# Patient Record
Sex: FEMALE | Race: WHITE | NOT HISPANIC OR LATINO | Employment: OTHER | URBAN - METROPOLITAN AREA
[De-identification: names, ages, dates, MRNs, and addresses within clinical notes are randomized per-mention and may not be internally consistent; named-entity substitution may affect disease eponyms.]

---

## 2017-02-13 ENCOUNTER — HOSPITAL ENCOUNTER (OUTPATIENT)
Dept: LAB | Facility: MEDICAL CENTER | Age: 62
End: 2017-02-13
Attending: NURSE PRACTITIONER
Payer: MEDICAID

## 2017-02-13 LAB
BASOPHILS # BLD AUTO: 0.05 K/UL (ref 0–0.12)
BASOPHILS NFR BLD AUTO: 0.9 % (ref 0–1.8)
COMMENT 1642: NORMAL
EOSINOPHIL # BLD: 0 K/UL (ref 0–0.51)
EOSINOPHIL NFR BLD AUTO: 0 % (ref 0–6.9)
ERYTHROCYTE [DISTWIDTH] IN BLOOD BY AUTOMATED COUNT: 58.6 FL (ref 35.9–50)
FERRITIN SERPL-MCNC: 9.3 NG/ML (ref 10–291)
HCT VFR BLD AUTO: 45.2 % (ref 37–47)
HGB BLD-MCNC: 13.5 G/DL (ref 12–16)
IMM GRANULOCYTES # BLD AUTO: 0.01 K/UL (ref 0–0.11)
IMM GRANULOCYTES NFR BLD AUTO: 0.2 % (ref 0–0.9)
IRON SATN MFR SERPL: 17 % (ref 15–55)
IRON SERPL-MCNC: 90 UG/DL (ref 40–170)
LYMPHOCYTES # BLD: 1.84 K/UL (ref 1–4.8)
LYMPHOCYTES NFR BLD AUTO: 34.8 % (ref 22–41)
MCH RBC QN AUTO: 28.2 PG (ref 27–33)
MCHC RBC AUTO-ENTMCNC: 29.9 G/DL (ref 33.6–35)
MCV RBC AUTO: 94.4 FL (ref 81.4–97.8)
MONOCYTES # BLD: 0.48 K/UL (ref 0–0.85)
MONOCYTES NFR BLD AUTO: 9.1 % (ref 0–13.4)
MORPHOLOGY BLD-IMP: NORMAL
NEUTROPHILS # BLD: 2.9 K/UL (ref 2–7.15)
NEUTROPHILS NFR BLD AUTO: 55 % (ref 44–72)
NRBC # BLD AUTO: 0.02 K/UL
NRBC BLD-RTO: 0.4 /100 WBC
PLATELET # BLD AUTO: 361 K/UL (ref 164–446)
PLATELET BLD QL SMEAR: NORMAL
PMV BLD AUTO: 10.6 FL (ref 9–12.9)
POIKILOCYTOSIS BLD QL SMEAR: NORMAL
RBC # BLD AUTO: 4.79 M/UL (ref 4.2–5.4)
RBC BLD AUTO: PRESENT
TARGETS BLD QL SMEAR: NORMAL
TIBC SERPL-MCNC: 515 UG/DL (ref 250–450)
WBC # BLD AUTO: 5.3 K/UL (ref 4.8–10.8)

## 2017-02-13 PROCEDURE — 85025 COMPLETE CBC W/AUTO DIFF WBC: CPT

## 2017-02-13 PROCEDURE — 82728 ASSAY OF FERRITIN: CPT

## 2017-02-13 PROCEDURE — 36415 COLL VENOUS BLD VENIPUNCTURE: CPT

## 2017-02-13 PROCEDURE — 83550 IRON BINDING TEST: CPT

## 2017-02-13 PROCEDURE — 83540 ASSAY OF IRON: CPT

## 2017-02-15 ENCOUNTER — HOSPITAL ENCOUNTER (EMERGENCY)
Facility: MEDICAL CENTER | Age: 62
End: 2017-02-15
Payer: MEDICAID

## 2017-02-15 VITALS
TEMPERATURE: 97.8 F | DIASTOLIC BLOOD PRESSURE: 85 MMHG | HEIGHT: 62 IN | OXYGEN SATURATION: 95 % | WEIGHT: 135 LBS | HEART RATE: 108 BPM | RESPIRATION RATE: 16 BRPM | BODY MASS INDEX: 24.84 KG/M2 | SYSTOLIC BLOOD PRESSURE: 146 MMHG

## 2017-02-15 LAB — EKG IMPRESSION: NORMAL

## 2017-02-15 PROCEDURE — 302449 STATCHG TRIAGE ONLY (STATISTIC)

## 2017-02-15 PROCEDURE — 93005 ELECTROCARDIOGRAM TRACING: CPT

## 2017-02-15 NOTE — ED NOTES
"Pt c/o \"my heart hurts\", mother states pt has abd pain over her gallbladder; states sudden onset while pt was driving. Pt states has had same pain 2 x this week. Was seen for first episode here. Pt c/o recent fall 1-2 weeks ago.   "

## 2017-02-15 NOTE — ED NOTES
"Pt c/o n/v, chills since today. States not being able to breathe \"very good\", +diaphoresis, shaking per family.   "

## 2017-02-15 NOTE — ED NOTES
Pt pointing to epigastric area when again asked where it hurts. Pt squirming in chair, moaning, appears uncomfortable.

## 2017-05-05 ENCOUNTER — HOSPITAL ENCOUNTER (OUTPATIENT)
Dept: LAB | Facility: MEDICAL CENTER | Age: 62
End: 2017-05-05
Attending: NURSE PRACTITIONER
Payer: MEDICAID

## 2017-05-05 LAB
ANION GAP SERPL CALC-SCNC: 9 MMOL/L (ref 0–11.9)
BASOPHILS # BLD AUTO: 0.8 % (ref 0–1.8)
BASOPHILS # BLD: 0.05 K/UL (ref 0–0.12)
BUN SERPL-MCNC: 11 MG/DL (ref 8–22)
CALCIUM SERPL-MCNC: 10.1 MG/DL (ref 8.5–10.5)
CHLORIDE SERPL-SCNC: 103 MMOL/L (ref 96–112)
CO2 SERPL-SCNC: 22 MMOL/L (ref 20–33)
CREAT SERPL-MCNC: 0.72 MG/DL (ref 0.5–1.4)
EOSINOPHIL # BLD AUTO: 0.03 K/UL (ref 0–0.51)
EOSINOPHIL NFR BLD: 0.5 % (ref 0–6.9)
ERYTHROCYTE [DISTWIDTH] IN BLOOD BY AUTOMATED COUNT: 54.4 FL (ref 35.9–50)
GFR SERPL CREATININE-BSD FRML MDRD: >60 ML/MIN/1.73 M 2
GLUCOSE SERPL-MCNC: 120 MG/DL (ref 65–99)
HCT VFR BLD AUTO: 44 % (ref 37–47)
HGB BLD-MCNC: 13.3 G/DL (ref 12–16)
IMM GRANULOCYTES # BLD AUTO: 0.04 K/UL (ref 0–0.11)
IMM GRANULOCYTES NFR BLD AUTO: 0.6 % (ref 0–0.9)
LYMPHOCYTES # BLD AUTO: 2.04 K/UL (ref 1–4.8)
LYMPHOCYTES NFR BLD: 30.9 % (ref 22–41)
MCH RBC QN AUTO: 28.2 PG (ref 27–33)
MCHC RBC AUTO-ENTMCNC: 30.2 G/DL (ref 33.6–35)
MCV RBC AUTO: 93.4 FL (ref 81.4–97.8)
MONOCYTES # BLD AUTO: 0.75 K/UL (ref 0–0.85)
MONOCYTES NFR BLD AUTO: 11.4 % (ref 0–13.4)
NEUTROPHILS # BLD AUTO: 3.69 K/UL (ref 2–7.15)
NEUTROPHILS NFR BLD: 55.8 % (ref 44–72)
NRBC # BLD AUTO: 0 K/UL
NRBC BLD AUTO-RTO: 0 /100 WBC
PLATELET # BLD AUTO: 399 K/UL (ref 164–446)
PMV BLD AUTO: 10.3 FL (ref 9–12.9)
POTASSIUM SERPL-SCNC: 5.1 MMOL/L (ref 3.6–5.5)
RBC # BLD AUTO: 4.71 M/UL (ref 4.2–5.4)
SODIUM SERPL-SCNC: 134 MMOL/L (ref 135–145)
WBC # BLD AUTO: 6.6 K/UL (ref 4.8–10.8)

## 2017-05-05 PROCEDURE — 85025 COMPLETE CBC W/AUTO DIFF WBC: CPT

## 2017-05-05 PROCEDURE — 80048 BASIC METABOLIC PNL TOTAL CA: CPT

## 2017-05-05 PROCEDURE — 36415 COLL VENOUS BLD VENIPUNCTURE: CPT

## 2018-11-27 ENCOUNTER — APPOINTMENT (OUTPATIENT)
Dept: PHYSICAL MEDICINE AND REHAB | Facility: MEDICAL CENTER | Age: 63
End: 2018-11-27
Payer: MEDICAID

## 2019-01-31 ENCOUNTER — OFFICE VISIT (OUTPATIENT)
Dept: URGENT CARE | Facility: CLINIC | Age: 64
End: 2019-01-31
Payer: MEDICAID

## 2019-01-31 ENCOUNTER — APPOINTMENT (OUTPATIENT)
Dept: RADIOLOGY | Facility: IMAGING CENTER | Age: 64
End: 2019-01-31
Attending: PHYSICIAN ASSISTANT
Payer: MEDICAID

## 2019-01-31 VITALS
HEIGHT: 62 IN | TEMPERATURE: 98.4 F | DIASTOLIC BLOOD PRESSURE: 74 MMHG | SYSTOLIC BLOOD PRESSURE: 124 MMHG | OXYGEN SATURATION: 98 % | HEART RATE: 66 BPM | RESPIRATION RATE: 16 BRPM | BODY MASS INDEX: 24.84 KG/M2 | WEIGHT: 135 LBS

## 2019-01-31 DIAGNOSIS — S63.502A SPRAIN OF LEFT WRIST, INITIAL ENCOUNTER: ICD-10-CM

## 2019-01-31 PROCEDURE — 99203 OFFICE O/P NEW LOW 30 MIN: CPT | Performed by: PHYSICIAN ASSISTANT

## 2019-01-31 PROCEDURE — 73110 X-RAY EXAM OF WRIST: CPT | Mod: LT | Performed by: EMERGENCY MEDICINE

## 2019-02-01 ASSESSMENT — ENCOUNTER SYMPTOMS
LOSS OF CONSCIOUSNESS: 0
PALPITATIONS: 0
FOCAL WEAKNESS: 0
COUGH: 0
FEVER: 0
SHORTNESS OF BREATH: 0
SENSORY CHANGE: 0
TINGLING: 0
SEIZURES: 0
CHILLS: 0
SPEECH CHANGE: 0
DIZZINESS: 0
TREMORS: 0
HEADACHES: 0
BLURRED VISION: 0
DOUBLE VISION: 0

## 2019-02-01 NOTE — PROGRESS NOTES
Subjective:      Vaishali Escobar is a 63 y.o. female who presents with Wrist Injury (Lt wrist injury, fell)            Wrist Injury    The incident occurred 3 to 6 hours ago. The incident occurred at home. The injury mechanism was a fall. The pain is present in the left wrist (left wrist). The pain is moderate. Pertinent negatives include no chest pain or tingling. The symptoms are aggravated by movement. She has tried nothing for the symptoms.       Review of Systems   Constitutional: Negative for chills and fever.   Eyes: Negative for blurred vision and double vision.   Respiratory: Negative for cough and shortness of breath.    Cardiovascular: Negative for chest pain and palpitations.   Musculoskeletal:        Wrist pain   Skin: Negative for rash.   Neurological: Negative for dizziness, tingling, tremors, sensory change, speech change, focal weakness, seizures, loss of consciousness and headaches.   All other systems reviewed and are negative.    PMH:  has a past medical history of Anesthesia; Angina; Angina pectoris (6/19/2009); Arthritis; Backpain (1998); CAD (coronary artery disease); Cancer ([facility]); Cervical radiculopathy (4/1/2009); Chronic pain (6/29/2011); COPD (chronic obstructive pulmonary disease) ([facility]) (4/1/2009); Dental disorder; Depression (4/1/2009); Dyslipidemia (6/29/2011); Headache(784.0) (4/1/2009); Heart burn; History of cardiac catheterization (4/1/2009); History of coronary artery bypass graft (4/1/2009); History of gastrointestinal bleeding (4/1/2009); History of hypertension (6/19/2009); History of mixed hyperlipidemia (6/19/2009); Lumbar radiculopathy (4/1/2009); Pneumonia (1990); Sepsis ([facility]) (2006 or 2007); Sleep apnea; Snoring; and Unspecified cataract.  MEDS:   Current Outpatient Prescriptions:   •  promethazine (PHENERGAN) 25 MG Tab, Take 25 mg by mouth every 6 hours as needed for Nausea/Vomiting., Disp: , Rfl:   •  calcium carbonate (TUMS) 500 MG Chew Tab, Take 500 mg by  mouth as needed (For hertburn)., Disp: , Rfl:   •  ketotifen (ZADITOR) 0.025 % ophthalmic solution, Place 1 Drop in both eyes as needed (For dry eye)., Disp: , Rfl:   •  gabapentin (NEURONTIN) 800 MG tablet, Take 800 mg by mouth 3 times a day., Disp: , Rfl:   •  Buprenorphine 20 MCG/HR PATCH WEEKLY, Apply 1 Patch to skin as directed every 7 days. On Sat, Disp: , Rfl:   •  methocarbamol (ROBAXIN) 500 MG Tab, Take 500-1,500 mg by mouth 3 times a day as needed. Indications: Musculoskeletal Pain, Disp: , Rfl:   •  Tapentadol HCl (NUCYNTA) 75 MG TABS, Take 75 mg by mouth every 3 hours. Indications: Moderate to Severe Pain, Disp: , Rfl:   ALLERGIES:   Allergies   Allergen Reactions   • Povidone Iodine Rash     SURGHX:   Past Surgical History:   Procedure Laterality Date   • HARDWARE REMOVAL NEURO N/A 6/23/2016    Procedure: HARDWARE REMOVAL NEURO L3-S1;  Surgeon: Aaron Martines M.D.;  Location: [facility];  Service:    • CERVICAL DISK AND FUSION ANTERIOR  4/28/2016    Procedure: CERVICAL DISK AND FUSION ANTERIOR Cervical4-5 WITH PEEK INTERBODY;  Surgeon: Aaron Martines M.D.;  Location: [facility];  Service:    • BASAL CELL EXCISION  2/19/2015    Performed by Adán Blake Jr., M.D. at SURGERY [facility]   • FLAP CLOSURE  2/19/2015    Performed by Adán Blake Jr., M.D. at [facility]   • OTHER ABDOMINAL SURGERY  2007    gastric bypass   • OTHER  2007    neck surgery   • OTHER CARDIAC SURGERY  2006    bypass x 4 vessels   • ARLEN HOLES     • OTHER NEUROLOGICAL SURG  2007,    6 back surgeries (at ages 13, 18, 20, 37, and 52)     SOCHX:  reports that she quit smoking about 13 years ago. Her smoking use included Cigarettes. She has a 20.00 pack-year smoking history. She has never used smokeless tobacco. She reports that she does not drink alcohol or use drugs.  FH: Family history was reviewed, no pertinent findings to report  Medications, Allergies, and current problem list  "reviewed today in Epic       Objective:     /74   Pulse 66   Temp 36.9 °C (98.4 °F)   Resp 16   Ht 1.575 m (5' 2\")   Wt 61.2 kg (135 lb)   SpO2 98%   BMI 24.69 kg/m²      Physical Exam   Constitutional: She is oriented to person, place, and time. She appears well-developed and well-nourished.   HENT:   Head: Normocephalic.   Neck: Normal range of motion. Neck supple.   Cardiovascular: Normal rate, regular rhythm, normal heart sounds and intact distal pulses.    Pulmonary/Chest: Effort normal and breath sounds normal.   Musculoskeletal: She exhibits tenderness.   PTP of medial left wrist.  2 point discriminate sensory intact.  Flexion and extension of digits in affected hand intact.  Distal pulses intact.  Cap refills are brisk < 2 seconds.   Neurological: She is alert and oriented to person, place, and time.   Psychiatric: She has a normal mood and affect. Her behavior is normal. Judgment and thought content normal.   Vitals reviewed.         1/31/2019 7:07 PM    HISTORY/REASON FOR EXAM:  Pain/Deformity Following Trauma. Fall.    TECHNIQUE/EXAM DESCRIPTION AND NUMBER OF VIEWS:  4 views of the LEFT wrist.    COMPARISON: None    FINDINGS:      There is no significant focal swelling.    There is no evidence of displaced  fracture or dislocation.       Impression       1.  There is no acute fracture or malalignment of the left wrist.          Assessment/Plan:     1. Sprain of left wrist, initial encounter  - RICE therapy  - DX-WRIST-COMPLETE 3+ LEFT; Future    Differential diagnosis, natural history, supportive care discussed. Follow-up with primary care provider within 7-10 days, emergency room precautions discussed.  Patient and/or family appears understanding of information.  Handout and review of patients diagnosis and treatment was discussed extensively.     "

## 2019-04-30 ENCOUNTER — OFFICE VISIT (OUTPATIENT)
Dept: URGENT CARE | Facility: CLINIC | Age: 64
End: 2019-04-30
Payer: MEDICAID

## 2019-04-30 VITALS
SYSTOLIC BLOOD PRESSURE: 114 MMHG | HEART RATE: 68 BPM | WEIGHT: 135 LBS | OXYGEN SATURATION: 98 % | HEIGHT: 62 IN | TEMPERATURE: 98 F | BODY MASS INDEX: 24.84 KG/M2 | DIASTOLIC BLOOD PRESSURE: 72 MMHG

## 2019-04-30 DIAGNOSIS — J01.00 ACUTE NON-RECURRENT MAXILLARY SINUSITIS: ICD-10-CM

## 2019-04-30 PROCEDURE — 99214 OFFICE O/P EST MOD 30 MIN: CPT | Performed by: PHYSICIAN ASSISTANT

## 2019-04-30 RX ORDER — FLUTICASONE PROPIONATE 50 MCG
1 SPRAY, SUSPENSION (ML) NASAL DAILY
Qty: 16 G | Refills: 0 | Status: SHIPPED | OUTPATIENT
Start: 2019-04-30 | End: 2023-08-10

## 2019-04-30 RX ORDER — OXYCODONE HYDROCHLORIDE 5 MG/1
3 TABLET ORAL EVERY 4 HOURS PRN
Status: ON HOLD | COMMUNITY
End: 2020-09-15

## 2019-04-30 RX ORDER — AMOXICILLIN AND CLAVULANATE POTASSIUM 875; 125 MG/1; MG/1
1 TABLET, FILM COATED ORAL 2 TIMES DAILY
Qty: 14 TAB | Refills: 0 | Status: SHIPPED | OUTPATIENT
Start: 2019-04-30 | End: 2019-05-07

## 2019-05-01 ASSESSMENT — ENCOUNTER SYMPTOMS
NAUSEA: 0
FEVER: 0
ABDOMINAL PAIN: 0
DIARRHEA: 0
MUSCULOSKELETAL NEGATIVE: 1
SHORTNESS OF BREATH: 0
CHILLS: 0
HEADACHES: 1
SINUS PAIN: 1
DIZZINESS: 0
RHINORRHEA: 1
COUGH: 1
VOMITING: 0
SPUTUM PRODUCTION: 0
SORE THROAT: 1

## 2019-05-02 NOTE — PROGRESS NOTES
"Subjective:      Vaishali Escobar is a 63 y.o. female who presents with Otalgia (sore thoat, jaw pain x 4 days )            Otalgia    There is pain in both ears. This is a new problem. The current episode started in the past 7 days (4 days). The problem occurs constantly. The problem has been unchanged. There has been no fever. The pain is at a severity of 3/10. The pain is mild. Associated symptoms include coughing, headaches, rhinorrhea and a sore throat. Pertinent negatives include no abdominal pain, diarrhea, ear discharge, rash or vomiting. She has tried nothing for the symptoms.       Review of Systems   Constitutional: Negative for chills and fever.   HENT: Positive for congestion, ear pain, rhinorrhea, sinus pain and sore throat. Negative for ear discharge.    Respiratory: Positive for cough. Negative for sputum production and shortness of breath.    Cardiovascular: Negative for chest pain.   Gastrointestinal: Negative for abdominal pain, diarrhea, nausea and vomiting.   Genitourinary: Negative.    Musculoskeletal: Negative.    Skin: Negative for rash.   Neurological: Positive for headaches. Negative for dizziness.        Objective:     /72   Pulse 68   Temp 36.7 °C (98 °F)   Ht 1.575 m (5' 2\")   Wt 61.2 kg (135 lb)   SpO2 98%   BMI 24.69 kg/m²      Physical Exam   Constitutional: She is oriented to person, place, and time. She appears well-developed and well-nourished. No distress.   HENT:   Head: Normocephalic and atraumatic.   Right Ear: Hearing, tympanic membrane, external ear and ear canal normal.   Left Ear: Hearing, tympanic membrane, external ear and ear canal normal.   Nose: Mucosal edema and rhinorrhea present. Right sinus exhibits maxillary sinus tenderness. Right sinus exhibits no frontal sinus tenderness. Left sinus exhibits maxillary sinus tenderness. Left sinus exhibits no frontal sinus tenderness.   Mouth/Throat: Posterior oropharyngeal erythema present. No oropharyngeal " exudate or posterior oropharyngeal edema.   Eyes: Pupils are equal, round, and reactive to light. Conjunctivae are normal. Right eye exhibits no discharge. Left eye exhibits no discharge.   Neck: Normal range of motion.   Cardiovascular: Normal rate, regular rhythm and normal heart sounds.    No murmur heard.  Pulmonary/Chest: Effort normal and breath sounds normal. No respiratory distress. She has no wheezes. She has no rales.   Musculoskeletal: Normal range of motion.   Lymphadenopathy:     She has no cervical adenopathy.   Neurological: She is alert and oriented to person, place, and time.   Skin: Skin is warm and dry. She is not diaphoretic.   Psychiatric: She has a normal mood and affect. Her behavior is normal.   Nursing note and vitals reviewed.         PMH:  has a past medical history of Anesthesia; Angina; Angina pectoris (6/19/2009); Arthritis; Backpain (1998); CAD (coronary artery disease); Cancer ([facility]); Cervical radiculopathy (4/1/2009); Chronic pain (6/29/2011); COPD (chronic obstructive pulmonary disease) ([facility]) (4/1/2009); Dental disorder; Depression (4/1/2009); Dyslipidemia (6/29/2011); Headache(784.0) (4/1/2009); Heart burn; History of cardiac catheterization (4/1/2009); History of coronary artery bypass graft (4/1/2009); History of gastrointestinal bleeding (4/1/2009); History of hypertension (6/19/2009); History of mixed hyperlipidemia (6/19/2009); Lumbar radiculopathy (4/1/2009); Pneumonia (1990); Sepsis ([facility]) (2006 or 2007); Sleep apnea; Snoring; and Unspecified cataract.  MEDS:   Current Outpatient Prescriptions:   •  oxyCODONE immediate-release (ROXICODONE) 5 MG Tab, Take 5 mg by mouth every four hours as needed for Severe Pain., Disp: , Rfl:   •  amoxicillin-clavulanate (AUGMENTIN) 875-125 MG Tab, Take 1 Tab by mouth 2 times a day for 7 days., Disp: 14 Tab, Rfl: 0  •  fluticasone (FLONASE) 50 MCG/ACT nasal spray, Spray 1 Spray in nose every day., Disp: 16 g, Rfl: 0  •  gabapentin (NEURONTIN)  800 MG tablet, Take 800 mg by mouth 3 times a day., Disp: , Rfl:   •  promethazine (PHENERGAN) 25 MG Tab, Take 25 mg by mouth every 6 hours as needed for Nausea/Vomiting., Disp: , Rfl:   •  calcium carbonate (TUMS) 500 MG Chew Tab, Take 500 mg by mouth as needed (For hertburn)., Disp: , Rfl:   •  ketotifen (ZADITOR) 0.025 % ophthalmic solution, Place 1 Drop in both eyes as needed (For dry eye)., Disp: , Rfl:   •  Buprenorphine 20 MCG/HR PATCH WEEKLY, Apply 1 Patch to skin as directed every 7 days. On Sat, Disp: , Rfl:   •  methocarbamol (ROBAXIN) 500 MG Tab, Take 500-1,500 mg by mouth 3 times a day as needed. Indications: Musculoskeletal Pain, Disp: , Rfl:   •  Tapentadol HCl (NUCYNTA) 75 MG TABS, Take 75 mg by mouth every 3 hours. Indications: Moderate to Severe Pain, Disp: , Rfl:   ALLERGIES:   Allergies   Allergen Reactions   • Povidone Iodine Rash     SURGHX:   Past Surgical History:   Procedure Laterality Date   • HARDWARE REMOVAL NEURO N/A 6/23/2016    Procedure: HARDWARE REMOVAL NEURO L3-S1;  Surgeon: Aaron Martines M.D.;  Location: [facility];  Service:    • CERVICAL DISK AND FUSION ANTERIOR  4/28/2016    Procedure: CERVICAL DISK AND FUSION ANTERIOR Cervical4-5 WITH PEEK INTERBODY;  Surgeon: Aaron Martines M.D.;  Location: [facility];  Service:    • BASAL CELL EXCISION  2/19/2015    Performed by Adán Blake Jr., M.D. at SURGERY [facility]   • FLAP CLOSURE  2/19/2015    Performed by Adán Blake Jr., M.D. at [facility]   • OTHER ABDOMINAL SURGERY  2007    gastric bypass   • OTHER  2007    neck surgery   • OTHER CARDIAC SURGERY  2006    bypass x 4 vessels   • ARLEN HOLES     • OTHER NEUROLOGICAL SURG  2007,    6 back surgeries (at ages 13, 18, 20, 37, and 52)     SOCHX:  reports that she quit smoking about 13 years ago. Her smoking use included Cigarettes. She has a 20.00 pack-year smoking history. She has never used smokeless tobacco. She reports that she  does not drink alcohol or use drugs.  FH: family history includes Cancer in her father.     Assessment/Plan:     1. Acute non-recurrent maxillary sinusitis  - amoxicillin-clavulanate (AUGMENTIN) 875-125 MG Tab; Take 1 Tab by mouth 2 times a day for 7 days.  Dispense: 14 Tab; Refill: 0   - Complete full course of antibiotics as prescribed   - fluticasone (FLONASE) 50 MCG/ACT nasal spray; Spray 1 Spray in nose every day.  Dispense: 16 g; Refill: 0    Discussed use of nedi-pot, humidifier, and Flonase nasal spray for symptomatic relief. Call or return to office if symptoms persist or worsen. The patient demonstrated a good understanding and agreed with the treatment plan.

## 2020-03-10 ENCOUNTER — HOSPITAL ENCOUNTER (OUTPATIENT)
Facility: MEDICAL CENTER | Age: 65
End: 2020-03-10
Attending: ORTHOPAEDIC SURGERY | Admitting: ORTHOPAEDIC SURGERY
Payer: MEDICARE

## 2020-05-27 ENCOUNTER — HOSPITAL ENCOUNTER (OUTPATIENT)
Facility: MEDICAL CENTER | Age: 65
End: 2020-05-27
Attending: ORTHOPAEDIC SURGERY | Admitting: ORTHOPAEDIC SURGERY
Payer: MEDICARE

## 2020-07-22 ENCOUNTER — OFFICE VISIT (OUTPATIENT)
Dept: CARDIOLOGY | Facility: MEDICAL CENTER | Age: 65
End: 2020-07-22
Payer: MEDICARE

## 2020-07-22 VITALS
OXYGEN SATURATION: 97 % | WEIGHT: 116.4 LBS | HEIGHT: 61 IN | DIASTOLIC BLOOD PRESSURE: 84 MMHG | RESPIRATION RATE: 12 BRPM | BODY MASS INDEX: 21.98 KG/M2 | SYSTOLIC BLOOD PRESSURE: 120 MMHG | HEART RATE: 60 BPM

## 2020-07-22 DIAGNOSIS — I10 ESSENTIAL HYPERTENSION: ICD-10-CM

## 2020-07-22 DIAGNOSIS — Z01.810 PREOP CARDIOVASCULAR EXAM: ICD-10-CM

## 2020-07-22 LAB — EKG IMPRESSION: NORMAL

## 2020-07-22 PROCEDURE — 93000 ELECTROCARDIOGRAM COMPLETE: CPT | Performed by: INTERNAL MEDICINE

## 2020-07-22 PROCEDURE — 99204 OFFICE O/P NEW MOD 45 MIN: CPT | Performed by: INTERNAL MEDICINE

## 2020-07-22 RX ORDER — ATORVASTATIN CALCIUM 10 MG/1
10 TABLET, FILM COATED ORAL DAILY
Qty: 30 TAB | Refills: 11 | Status: SHIPPED | OUTPATIENT
Start: 2020-07-22 | End: 2021-03-26

## 2020-07-22 RX ORDER — FENTANYL 50 UG/1
PATCH TRANSDERMAL
COMMUNITY
Start: 2020-07-20 | End: 2023-08-10

## 2020-07-22 NOTE — PROGRESS NOTES
"    Cardiology Initial Consultation Note    Date of note:    7/22/2020    Primary Care Provider: Clarisa Ricci M.D.  Referring Provider: Juventino Greene M.D.     Patient Name: Vaishali Recio   YOB: 1955  MRN:              [id number]    Chief Complaint: Preop cardiovascular examination, coronary artery disease    Vaishali Emery is a 65 y.o. female  patient presented today for preop cardiovascular examination prior to shoulder surgery.  She also has history of hypertension, hyperlipidemia, coronary artery disease status post four-vessel CABG in 2012.  She was being followed in our clinic before.  She feels well from cardiac standpoint denies chest pains, shortness of breath.  She is not very active due to her chronic back pain but fairly active, cleans the house and does vacuuming etc.  She is not taking any cardiac medications at this time.  She does not have any specific complaints to me today    ROS    Chronic back pain, shoulder pain, neck pain.  All other systems reviewed and discussed using a comprehensive questionnaire and are negative.     Past medical history, family history, social history, allergies and labs are reviewed and updated as needed as documented below.    Past Medical History:   Diagnosis Date   • Anesthesia     \"difficulty breathing\"   • Angina     none currently    • Angina pectoris 6/19/2009   • Arthritis    • Backpain 1998   • CAD (coronary artery disease)    • Cancer ([facility])     skin   • Cervical radiculopathy 4/1/2009   • Chronic pain 6/29/2011   • COPD (chronic obstructive pulmonary disease) ([facility]) 4/1/2009   • Dental disorder     broken tooth    • Depression 4/1/2009   • Dyslipidemia 6/29/2011   • Headache(784.0) 4/1/2009   • Heart burn    • History of cardiac catheterization 4/1/2009   • History of coronary artery bypass graft 4/1/2009   • History of gastrointestinal bleeding 4/1/2009   • History of hypertension 6/19/2009   • History of mixed " hyperlipidemia 6/19/2009   • Lumbar radiculopathy 4/1/2009   • Pneumonia 1990    approximate date   • Sepsis ([facility]) 2006 or 2007   • Sleep apnea     before gastric bypass, no CPAP now   • Snoring    • Unspecified cataract     early stage          Past Surgical History:   Procedure Laterality Date   • HARDWARE REMOVAL NEURO N/A 6/23/2016    Procedure: HARDWARE REMOVAL NEURO L3-S1;  Surgeon: Aaron Martines M.D.;  Location: SURGERY [facility];  Service:    • CERVICAL DISK AND FUSION ANTERIOR  4/28/2016    Procedure: CERVICAL DISK AND FUSION ANTERIOR Cervical4-5 WITH PEEK INTERBODY;  Surgeon: Aaron Martines M.D.;  Location: SURGERY [facility];  Service:    • BASAL CELL EXCISION  2/19/2015    Performed by Adán Blake Jr., M.D. at SURGERY [facility]   • FLAP CLOSURE  2/19/2015    Performed by Adán Blake Jr., M.D. at SURGERY [facility]   • OTHER ABDOMINAL SURGERY  2007    gastric bypass   • OTHER  2007    neck surgery   • OTHER CARDIAC SURGERY  2006    bypass x 4 vessels   • ARLEN HOLES     • OTHER NEUROLOGICAL SURG  2007,    6 back surgeries (at ages 13, 18, 20, 37, and 52)         Current Outpatient Medications   Medication Sig Dispense Refill   • fentaNYL (DURAGESIC) 50 MCG/HR PATCH 72 HR      • atorvastatin (LIPITOR) 10 MG Tab Take 1 Tab by mouth every day. 30 Tab 11   • aspirin EC (ECOTRIN) 81 MG Tablet Delayed Response Take 1 Tab by mouth every day. 100 Tab 3   • oxyCODONE immediate-release (ROXICODONE) 5 MG Tab Take 3 mg by mouth every four hours as needed for Severe Pain.     • fluticasone (FLONASE) 50 MCG/ACT nasal spray Spray 1 Spray in nose every day. 16 g 0   • promethazine (PHENERGAN) 25 MG Tab Take 25 mg by mouth every 6 hours as needed for Nausea/Vomiting.     • calcium carbonate (TUMS) 500 MG Chew Tab Take 500 mg by mouth as needed (For hertburn).     • ketotifen (ZADITOR) 0.025 % ophthalmic solution Place 1 Drop in both eyes as needed (For dry eye).     • gabapentin  (NEURONTIN) 800 MG tablet Take 800 mg by mouth 3 times a day.     • methocarbamol (ROBAXIN) 500 MG Tab Take 500-1,500 mg by mouth 3 times a day as needed. Indications: Musculoskeletal Pain     • Buprenorphine 20 MCG/HR PATCH WEEKLY Apply 1 Patch to skin as directed every 7 days. On Sat     • Tapentadol HCl (NUCYNTA) 75 MG TABS Take 75 mg by mouth every 3 hours. Indications: Moderate to Severe Pain       No current facility-administered medications for this visit.          Allergies   Allergen Reactions   • Povidone Iodine Rash         Family History   Problem Relation Age of Onset   • Cancer Father         brain         Social History     Socioeconomic History   • Marital status:      Spouse name: Not on file   • Number of children: Not on file   • Years of education: Not on file   • Highest education level: Not on file   Occupational History   • Not on file   Social Needs   • Financial resource strain: Not on file   • Food insecurity     Worry: Not on file     Inability: Not on file   • Transportation needs     Medical: Not on file     Non-medical: Not on file   Tobacco Use   • Smoking status: Former Smoker     Packs/day: 0.50     Years: 40.00     Pack years: 20.00     Types: Cigarettes     Last attempt to quit: 2006     Years since quittin.4   • Smokeless tobacco: Never Used   Substance and Sexual Activity   • Alcohol use: No   • Drug use: No   • Sexual activity: Not on file   Lifestyle   • Physical activity     Days per week: Not on file     Minutes per session: Not on file   • Stress: Not on file   Relationships   • Social connections     Talks on phone: Not on file     Gets together: Not on file     Attends Confucianist service: Not on file     Active member of club or organization: Not on file     Attends meetings of clubs or organizations: Not on file     Relationship status: Not on file   • Intimate partner violence     Fear of current or ex partner: Not on file     Emotionally abused: Not on  "file     Physically abused: Not on file     Forced sexual activity: Not on file   Other Topics Concern   • Not on file   Social History Narrative   • Not on file         Physical Exam:  Ambulatory Vitals  /84 (BP Location: Left arm, Patient Position: Sitting, BP Cuff Size: Adult)   Pulse 60   Resp 12   Ht 1.549 m (5' 1\")   Wt 52.8 kg (116 lb 6.5 oz)   SpO2 97%    Oxygen Therapy:  Pulse Oximetry: 97 %  BP Readings from Last 4 Encounters:   07/22/20 120/84   04/30/19 114/72   01/31/19 124/74   02/15/17 146/85       Weight/BMI: Body mass index is 21.99 kg/m².  Wt Readings from Last 4 Encounters:   07/22/20 52.8 kg (116 lb 6.5 oz)   04/30/19 61.2 kg (135 lb)   01/31/19 61.2 kg (135 lb)   02/15/17 61.2 kg (135 lb)     General: Well appearing and in no apparent distress  Head: atrumatic  Eyes: No conjunctival pallor   ENT: normal external appearance of nose and ears  Neck: JVD absent, carotid bruits absent  Lungs: respiratory sounds  normal, additional breath sounds absent  Heart: Regular rhythm,   No palpable thrills on palpation, 2 x 6 systolic murmur aortic area, no rubs,   Lower extremity edema absent.   Abdomen: soft, non tender, non distended.  Extremities/MSK: no clubbing, no cyanosis  Neurological: normal orientation, Gait normal   Psychiatric: Appropriate affect, intact judgement and insight  Skin: Warm extremities        Lab Data Review:  Lab Results   Component Value Date/Time    CHOLSTRLTOT 184 10/07/2016 08:22 AM     (H) 10/07/2016 08:22 AM    HDL 60 10/07/2016 08:22 AM    TRIGLYCERIDE 76 10/07/2016 08:22 AM       Lab Results   Component Value Date/Time    SODIUM 134 (L) 05/05/2017 12:55 PM    POTASSIUM 5.1 05/05/2017 12:55 PM    CHLORIDE 103 05/05/2017 12:55 PM    CO2 22 05/05/2017 12:55 PM    GLUCOSE 120 (H) 05/05/2017 12:55 PM    BUN 11 05/05/2017 12:55 PM    CREATININE 0.72 05/05/2017 12:55 PM    CREATININE 0.7 02/06/2009 05:35 AM     Lab Results   Component Value Date/Time    " ALKPHOSPHAT 100 (H) 10/07/2016 08:22 AM    ASTSGOT 18 10/07/2016 08:22 AM    ALTSGPT 12 10/07/2016 08:22 AM    TBILIRUBIN 0.2 10/07/2016 08:22 AM      Lab Results   Component Value Date/Time    WBC 6.6 2017 12:55 PM     EKG today shows sinus rhythm, possible prior anterior MI    Echo 3/13/2016  CONCLUSIONS  No prior study is available for comparison.  Normal left ventricular size, wall thickness, regional wall motion,   systolic function and diastolic function.  Left ventricular ejection fraction is visually estimated to be 75%.  Mildly dilated left atrium.  Mild mitral regurgitation.  Mild mitral regurgitation.  Mild tricuspid regurgitation.  Right ventricular systolic pressure is estimated to be 33 mmHg.  The right ventricle was normal in size and function.  Normal pericardium without effusion.    Medical Decision Makin-year-old female patient with  1.  Coronary artery disease status post four-vessel CABG  2.  COPD, prior smoker  3.  Dyslipidemia  4.  Hypertension    She is doing fairly from cardiovascular standpoint.  No symptoms of chest pain, shortness of breath.  On exam no evidence of heart failure.  Her functional capacity is at least 5 METS.  I will obtain echocardiogram to evaluate LV function.  From cardiac standpoint she is reasonable candidate to proceed with surgery, intermediate risk given her history of CAD.  Start Lipitor 10 mg daily.  Start aspirin 81 mg after she is done with surgery.    Return in about 1 year (around 2021).    This note was dictated using Dragon speech recognition software.    Sage REED  Interventional cardiologist  [facility], [address]  [address] [address]  [place] [id number]  Phone: [phone]  Fax: [phone]

## 2020-08-03 ENCOUNTER — HOSPITAL ENCOUNTER (OUTPATIENT)
Dept: CARDIOLOGY | Facility: MEDICAL CENTER | Age: 65
End: 2020-08-03
Attending: INTERNAL MEDICINE
Payer: MEDICARE

## 2020-08-03 DIAGNOSIS — Z01.810 PREOP CARDIOVASCULAR EXAM: ICD-10-CM

## 2020-08-03 PROCEDURE — 93306 TTE W/DOPPLER COMPLETE: CPT

## 2020-08-04 LAB
LV EJECT FRACT  99904: 70
LV EJECT FRACT MOD 2C 99903: 63.49
LV EJECT FRACT MOD 4C 99902: 68.71
LV EJECT FRACT MOD BP 99901: 67.5

## 2020-08-04 PROCEDURE — 93306 TTE W/DOPPLER COMPLETE: CPT | Mod: 26 | Performed by: INTERNAL MEDICINE

## 2020-08-10 ENCOUNTER — TELEPHONE (OUTPATIENT)
Dept: CARDIOLOGY | Facility: MEDICAL CENTER | Age: 65
End: 2020-08-10

## 2020-08-10 NOTE — TELEPHONE ENCOUNTER
AK/cathleen Hinkle with [facility] calling to make sure, based on echo results, that pt is clear to proceed with her left total should arthroplasty. Arin received surgery clearance 7/22, but needs to know if there is anything additional AK must provide for this clearance.    Please call Airn

## 2020-08-10 NOTE — TELEPHONE ENCOUNTER
No Reading Provider Prelim 8/3/2020    Sage Carson M.D.  [phone] 8/4/2020       Narrative & Impression     Transthoracic  Echo Report        Echocardiography Laboratory     CONCLUSIONS  Prior echocardiogram 3/13/2016.  Left ventricular ejection fraction is visually estimated to be 70%.  No significant valve disease or flow abnormalities.      ---------------------------------------------------------------------------------------------    Contacted Arin from [facility] and notified her that according to Dr. Carson's above interpretation of the echocardiogram there are no concerning findings and that the cardiac clearance is still valid. Encouraged to call back if any additional questions.

## 2020-08-14 ENCOUNTER — HOSPITAL ENCOUNTER (EMERGENCY)
Facility: MEDICAL CENTER | Age: 65
End: 2020-08-14
Attending: EMERGENCY MEDICINE
Payer: MEDICARE

## 2020-08-14 VITALS
HEIGHT: 62 IN | WEIGHT: 110 LBS | BODY MASS INDEX: 20.24 KG/M2 | RESPIRATION RATE: 14 BRPM | DIASTOLIC BLOOD PRESSURE: 81 MMHG | OXYGEN SATURATION: 92 % | SYSTOLIC BLOOD PRESSURE: 141 MMHG | HEART RATE: 70 BPM

## 2020-08-14 DIAGNOSIS — T50.901A ACCIDENTAL DRUG OVERDOSE, INITIAL ENCOUNTER: ICD-10-CM

## 2020-08-14 PROCEDURE — 99285 EMERGENCY DEPT VISIT HI MDM: CPT

## 2020-08-15 NOTE — ED NOTES
Discharge instructions and prescriptions discussed with pt. Pt verbalized understanding. Pt discharged ambulatory via POV with son.

## 2020-08-15 NOTE — ED TRIAGE NOTES
Chief Complaint   Patient presents with   • Drug Overdose     prescribed opiates, fentanyl patches, oxycodone     Pt BIBA from home for accidental opiate overdose. Pt took too many of her prescribed oxycodone and has on 2 fentanyl patches instead of 1. Pt found to be unresponsive by son breathing 4 times a minute. Pt given .5 narcan by EMS. Pt A+0x4 at this time. VSS. Pt sleepy but easily arousable.

## 2020-08-15 NOTE — ED PROVIDER NOTES
ED Provider Note    Scribed for Dr. Andrew Celestin M.D. by Juventino Morales. 8/14/2020  8:21 PM    Primary care provider: Clarisa Ricci M.D.  Means of arrival: EMS  History obtained from: EMS, Patient  History limited by: None    CHIEF COMPLAINT  Chief Complaint   Patient presents with   • Drug Overdose     prescribed opiates, fentanyl patches, oxycodone       hospitals  Vaishali Emery is a 65 y.o. female who presents to the Emergency Department after being brought in by EMS. Per EMS the patients son found the patient non-responsive with two fentanyl patches on, and was breathing an estimated 4 times a minute. Patient is also known to have an oxycodone prescription and it is suspected that she also took a few of these as well. EMS arrived and treated the patient with 0.5 mg of Narcan. Patient states that she is on these medications for severe joint pain and is scheduled to have repairs in September. It is thought that these surgeries may be be pushed back secondary to the ongoing COVID-19 pandemic. She otherwise denies having any shortness of breath or chest pain at this time.    Diaphoretic, pupils equal and reactive,    REVIEW OF SYSTEMS  Pertinent positives include overdose, altered level of consciousness. Pertinent negatives include no chest pain, shortness of breath.   See HPI for further details.     PAST MEDICAL HISTORY   has a past medical history of Anesthesia, Angina, Angina pectoris (6/19/2009), Arthritis, Backpain (1998), CAD (coronary artery disease), Cancer ([facility]), Cervical radiculopathy (4/1/2009), Chronic pain (6/29/2011), COPD (chronic obstructive pulmonary disease) ([facility]) (4/1/2009), Dental disorder, Depression (4/1/2009), Dyslipidemia (6/29/2011), Headache(784.0) (4/1/2009), Heart burn, History of cardiac catheterization (4/1/2009), History of coronary artery bypass graft (4/1/2009), History of gastrointestinal bleeding (4/1/2009), History of hypertension (6/19/2009), History of mixed  hyperlipidemia (2009), Lumbar radiculopathy (2009), Pneumonia (), Sepsis (HCC) ( or ), Sleep apnea, Snoring, and Unspecified cataract.    SURGICAL HISTORY   has a past surgical history that includes other abdominal surgery (); other (); other cardiac surgery (); basal cell excision (2015); flap closure (2015); ritika holes; other neurological surg (,); cervical disk and fusion anterior (2016); and hardware removal neuro (N/A, 2016).    SOCIAL HISTORY  Social History     Tobacco Use   • Smoking status: Former Smoker     Packs/day: 0.50     Years: 40.00     Pack years: 20.00     Types: Cigarettes     Quit date: 2006     Years since quittin.5   • Smokeless tobacco: Never Used   Substance Use Topics   • Alcohol use: No   • Drug use: No      Social History     Substance and Sexual Activity   Drug Use No       FAMILY HISTORY  Family History   Problem Relation Age of Onset   • Cancer Father         brain       CURRENT MEDICATIONS  No current facility-administered medications on file prior to encounter.      Current Outpatient Medications on File Prior to Encounter   Medication Sig Dispense Refill   • fentaNYL (DURAGESIC) 50 MCG/HR PATCH 72 HR      • atorvastatin (LIPITOR) 10 MG Tab Take 1 Tab by mouth every day. 30 Tab 11   • aspirin EC (ECOTRIN) 81 MG Tablet Delayed Response Take 1 Tab by mouth every day. 100 Tab 3   • oxyCODONE immediate-release (ROXICODONE) 5 MG Tab Take 3 mg by mouth every four hours as needed for Severe Pain.     • fluticasone (FLONASE) 50 MCG/ACT nasal spray Spray 1 Spray in nose every day. 16 g 0   • promethazine (PHENERGAN) 25 MG Tab Take 25 mg by mouth every 6 hours as needed for Nausea/Vomiting.     • calcium carbonate (TUMS) 500 MG Chew Tab Take 500 mg by mouth as needed (For hertburn).     • ketotifen (ZADITOR) 0.025 % ophthalmic solution Place 1 Drop in both eyes as needed (For dry eye).     • gabapentin (NEURONTIN) 800 MG tablet  "Take 800 mg by mouth 3 times a day.     • Buprenorphine 20 MCG/HR PATCH WEEKLY Apply 1 Patch to skin as directed every 7 days. On Sat     • methocarbamol (ROBAXIN) 500 MG Tab Take 500-1,500 mg by mouth 3 times a day as needed. Indications: Musculoskeletal Pain     • Tapentadol HCl (NUCYNTA) 75 MG TABS Take 75 mg by mouth every 3 hours. Indications: Moderate to Severe Pain         ALLERGIES  Allergies   Allergen Reactions   • Povidone Iodine Rash       PHYSICAL EXAM  VITAL SIGNS: BP (!) 180/86   Pulse 69   Resp (!) 11   Ht 1.575 m (5' 2\")   Wt 49.9 kg (110 lb)   SpO2 99%   BMI 20.12 kg/m²     Constitutional: Well developed, Well nourished, mild distress, Non-toxic appearance.   HENT: Normocephalic, Atraumatic, Bilateral external ears normal, Oropharynx moist, No oral exudates.   Eyes: Pupils are equal and reactive with no pinpoints, EOMI, Conjunctiva normal, No discharge.   Neck: No tenderness, Supple, No stridor.   Lymphatic: No lymphadenopathy noted.   Cardiovascular: Normal heart rate, Normal rhythm.   Thorax & Lungs: Clear to auscultation bilaterally, No respiratory distress, No wheezing, No crackles.   Abdomen: Soft, No tenderness, No masses, No pulsatile masses.   Skin: Warm, Diaphoretic, No erythema, No rash.   Extremities:, No edema No cyanosis.   Musculoskeletal: No tenderness to palpation or major deformities noted.  Intact distal pulses  Neurologic: Awake, alert. Moves all extremities spontaneously.  Psychiatric: Affect normal, Judgment normal, Mood normal.  No suicidal ideation or homicidal ideation      COURSE & MEDICAL DECISION MAKING  Pertinent Labs & Imaging studies reviewed. (See chart for details)    8:21 PM - Patient seen and examined at bedside. The differential diagnoses include but are not limited to: Intentional overdose accidental overdose medication effect discussed with the patient that she overdosed on her pain medications, and was treated with a reversal agent known as Narcan. At " this time I will need to continue to monitor her here as sometimes the effects of an overdose can last longer than the Narcan and she could become non-responsive again. Given this she will continue to be monitored for the next hour. Patient understands and agrees.    9:55 PM - Patient reassessed who reports feeling improved. Answered any questions or concerns the patient had, and they are recommended to follow up with their PCP. They understand the plan of outpatient care and agree to discharge.    Ms. Sergio Emery was here with a confirmed overdose of T40.4 - Synthetic narcotics; she has no other known overdoses.      Decision Making:  This is a patient presenting with probable o patient verdose on her opioids.  She does not seem suicidal.  I think this was accidental mild overdose was not intentiona.  At time of discharge the patient has no symptoms her initial diaphoresis resolved.    DISPOSITION:  Patient will be discharged home in stable condition.    FOLLOW UP:  No follow-up provider specified.    FINAL IMPRESSION  1. Accidental drug overdose, initial encounter          Juventino MERINO (Scribe), am scribing for, and in the presence of, Andrew Celestin M.D..    Electronically signed by: Juventino Morales (Scribe), 8/14/2020    Andrew MERINO M.D. personally performed the services described in this documentation, as scribed by Juventino Morales in my presence, and it is both accurate and complete. E.    The note accurately reflects work and decisions made by me.  Andrew Celestin M.D.  8/14/2020  11:50 PM

## 2020-09-02 ENCOUNTER — PRE-ADMISSION TESTING (OUTPATIENT)
Dept: ADMISSIONS | Facility: MEDICAL CENTER | Age: 65
DRG: 483 | End: 2020-09-02
Attending: ORTHOPAEDIC SURGERY
Payer: MEDICARE

## 2020-09-02 DIAGNOSIS — Z01.812 PRE-OPERATIVE LABORATORY EXAMINATION: ICD-10-CM

## 2020-09-02 LAB
ANION GAP SERPL CALC-SCNC: 8 MMOL/L (ref 7–16)
BUN SERPL-MCNC: 12 MG/DL (ref 8–22)
CALCIUM SERPL-MCNC: 8.7 MG/DL (ref 8.4–10.2)
CHLORIDE SERPL-SCNC: 103 MMOL/L (ref 96–112)
CO2 SERPL-SCNC: 24 MMOL/L (ref 20–33)
CREAT SERPL-MCNC: 0.66 MG/DL (ref 0.5–1.4)
ERYTHROCYTE [DISTWIDTH] IN BLOOD BY AUTOMATED COUNT: 49.6 FL (ref 35.9–50)
GLUCOSE SERPL-MCNC: 86 MG/DL (ref 65–99)
HCT VFR BLD AUTO: 43.7 % (ref 37–47)
HGB BLD-MCNC: 13.7 G/DL (ref 12–16)
MCH RBC QN AUTO: 30 PG (ref 27–33)
MCHC RBC AUTO-ENTMCNC: 31.4 G/DL (ref 33.6–35)
MCV RBC AUTO: 95.6 FL (ref 81.4–97.8)
PLATELET # BLD AUTO: 337 K/UL (ref 164–446)
PMV BLD AUTO: 10 FL (ref 9–12.9)
POTASSIUM SERPL-SCNC: 4.6 MMOL/L (ref 3.6–5.5)
RBC # BLD AUTO: 4.57 M/UL (ref 4.2–5.4)
SCCMEC + MECA PNL NOSE NAA+PROBE: POSITIVE
SCCMEC + MECA PNL NOSE NAA+PROBE: POSITIVE
SODIUM SERPL-SCNC: 135 MMOL/L (ref 135–145)
WBC # BLD AUTO: 8.8 K/UL (ref 4.8–10.8)

## 2020-09-02 PROCEDURE — 36415 COLL VENOUS BLD VENIPUNCTURE: CPT

## 2020-09-02 PROCEDURE — 85027 COMPLETE CBC AUTOMATED: CPT

## 2020-09-02 PROCEDURE — 80048 BASIC METABOLIC PNL TOTAL CA: CPT

## 2020-09-02 PROCEDURE — 87641 MR-STAPH DNA AMP PROBE: CPT

## 2020-09-02 PROCEDURE — 87640 STAPH A DNA AMP PROBE: CPT

## 2020-09-02 RX ORDER — DULOXETIN HYDROCHLORIDE 60 MG/1
60 CAPSULE, DELAYED RELEASE ORAL DAILY
COMMUNITY
Start: 2020-08-16

## 2020-09-02 RX ORDER — OXYCODONE HYDROCHLORIDE 30 MG/1
30 TABLET ORAL
COMMUNITY
Start: 2020-08-19 | End: 2023-08-20 | Stop reason: SDUPTHER

## 2020-09-02 NOTE — DISCHARGE PLANNING
DISCHARGE PLANNING NOTE - TOTAL JOINT     Procedure: Procedure(s):  ARTHROPLASTY, SHOULDER, TOTAL  TENODESIS, BICEPS - AND ADDISON  Procedure Date: 9/15/2020  Insurance:  Payor: MEDICARE / Plan: MEDICARE PART A & B / Product Type: *No Product type* /   Equipment currently available at home? ice and pillows.  Steps into the home? 0  Steps within the home? 0  Toilet height? Standard  Type of shower? walk-in shower  Who will be with you during your recovery? Family members.  Is Outpatient Physical Therapy set up after surgery? No   Did you take the Total Joint Class and where? Yes, at [facility] book and class info given to pt.   Planning on same day discharge? No.     This writer met with pt during her preadmission appointment. Pt states she has all needed equipment. Home safety checklist reviewed and copy given to pt. All questions answered and pt verbalizes understanding of all instructions. No dc needs identified at this moment. Anticipate dc to home without barriers.

## 2020-09-02 NOTE — OR NURSING
"Preparing for your Procedure information\" sheet given to patient with verbal and written instructions. Patient instructed to continue prescribed medications through the day before surgery, instructed to take the following medications the day of surgery per anesthesia protocol  Cymbalta, gabapentin, oxycodone. Pt instructed to self isolate after COVID test until surgery, pt agrees. Referred to Connie CASTANEDA for discharge planning.   "

## 2020-09-11 ENCOUNTER — PRE-ADMISSION TESTING (OUTPATIENT)
Dept: ADMISSIONS | Facility: MEDICAL CENTER | Age: 65
DRG: 483 | End: 2020-09-11
Attending: ORTHOPAEDIC SURGERY
Payer: MEDICARE

## 2020-09-11 DIAGNOSIS — Z01.812 PRE-OPERATIVE LABORATORY EXAMINATION: ICD-10-CM

## 2020-09-11 LAB
COVID ORDER STATUS COVID19: NORMAL
SARS-COV-2 RNA RESP QL NAA+PROBE: NOTDETECTED
SPECIMEN SOURCE: NORMAL

## 2020-09-11 PROCEDURE — C9803 HOPD COVID-19 SPEC COLLECT: HCPCS

## 2020-09-11 PROCEDURE — U0003 INFECTIOUS AGENT DETECTION BY NUCLEIC ACID (DNA OR RNA); SEVERE ACUTE RESPIRATORY SYNDROME CORONAVIRUS 2 (SARS-COV-2) (CORONAVIRUS DISEASE [COVID-19]), AMPLIFIED PROBE TECHNIQUE, MAKING USE OF HIGH THROUGHPUT TECHNOLOGIES AS DESCRIBED BY CMS-2020-01-R: HCPCS

## 2020-09-14 ENCOUNTER — ANESTHESIA EVENT (OUTPATIENT)
Dept: SURGERY | Facility: MEDICAL CENTER | Age: 65
DRG: 483 | End: 2020-09-14
Payer: MEDICARE

## 2020-09-14 RX ORDER — OXYCODONE HCL 10 MG/1
10 TABLET, FILM COATED, EXTENDED RELEASE ORAL ONCE
Status: CANCELLED | OUTPATIENT
Start: 2020-09-15 | End: 2020-09-16

## 2020-09-15 ENCOUNTER — APPOINTMENT (OUTPATIENT)
Dept: RADIOLOGY | Facility: MEDICAL CENTER | Age: 65
DRG: 483 | End: 2020-09-15
Attending: ORTHOPAEDIC SURGERY
Payer: MEDICARE

## 2020-09-15 ENCOUNTER — ANESTHESIA (OUTPATIENT)
Dept: SURGERY | Facility: MEDICAL CENTER | Age: 65
DRG: 483 | End: 2020-09-15
Payer: MEDICARE

## 2020-09-15 ENCOUNTER — HOSPITAL ENCOUNTER (INPATIENT)
Facility: MEDICAL CENTER | Age: 65
LOS: 1 days | DRG: 483 | End: 2020-09-15
Attending: ORTHOPAEDIC SURGERY | Admitting: ORTHOPAEDIC SURGERY
Payer: MEDICARE

## 2020-09-15 VITALS
OXYGEN SATURATION: 99 % | SYSTOLIC BLOOD PRESSURE: 121 MMHG | HEART RATE: 61 BPM | HEIGHT: 62 IN | RESPIRATION RATE: 18 BRPM | DIASTOLIC BLOOD PRESSURE: 62 MMHG | BODY MASS INDEX: 20.89 KG/M2 | WEIGHT: 113.54 LBS | TEMPERATURE: 97.7 F

## 2020-09-15 PROCEDURE — 160041 HCHG SURGERY MINUTES - EA ADDL 1 MIN LEVEL 4: Performed by: ORTHOPAEDIC SURGERY

## 2020-09-15 PROCEDURE — 502578 HCHG PACK, TOTAL HIP: Performed by: ORTHOPAEDIC SURGERY

## 2020-09-15 PROCEDURE — 700105 HCHG RX REV CODE 258: Performed by: ORTHOPAEDIC SURGERY

## 2020-09-15 PROCEDURE — 500028 HCHG ARTHROWAND TURBOVAC 3.5/90 SUCT.: Performed by: ORTHOPAEDIC SURGERY

## 2020-09-15 PROCEDURE — 160029 HCHG SURGERY MINUTES - 1ST 30 MINS LEVEL 4: Performed by: ORTHOPAEDIC SURGERY

## 2020-09-15 PROCEDURE — 97165 OT EVAL LOW COMPLEX 30 MIN: CPT

## 2020-09-15 PROCEDURE — 700111 HCHG RX REV CODE 636 W/ 250 OVERRIDE (IP): Performed by: ORTHOPAEDIC SURGERY

## 2020-09-15 PROCEDURE — 160036 HCHG PACU - EA ADDL 30 MINS PHASE I: Performed by: ORTHOPAEDIC SURGERY

## 2020-09-15 PROCEDURE — 160002 HCHG RECOVERY MINUTES (STAT): Performed by: ORTHOPAEDIC SURGERY

## 2020-09-15 PROCEDURE — 502581 HCHG PACK, SHOULDER ARTHROSCOPY: Performed by: ORTHOPAEDIC SURGERY

## 2020-09-15 PROCEDURE — 700101 HCHG RX REV CODE 250: Performed by: ANESTHESIOLOGY

## 2020-09-15 PROCEDURE — 700101 HCHG RX REV CODE 250

## 2020-09-15 PROCEDURE — 700102 HCHG RX REV CODE 250 W/ 637 OVERRIDE(OP): Performed by: ORTHOPAEDIC SURGERY

## 2020-09-15 PROCEDURE — L8699 PROSTHETIC IMPLANT NOS: HCPCS | Performed by: ORTHOPAEDIC SURGERY

## 2020-09-15 PROCEDURE — 700111 HCHG RX REV CODE 636 W/ 250 OVERRIDE (IP): Performed by: ANESTHESIOLOGY

## 2020-09-15 PROCEDURE — 0RRK0JZ REPLACEMENT OF LEFT SHOULDER JOINT WITH SYNTHETIC SUBSTITUTE, OPEN APPROACH: ICD-10-PCS | Performed by: ORTHOPAEDIC SURGERY

## 2020-09-15 PROCEDURE — 770001 HCHG ROOM/CARE - MED/SURG/GYN PRIV*

## 2020-09-15 PROCEDURE — 160009 HCHG ANES TIME/MIN: Performed by: ORTHOPAEDIC SURGERY

## 2020-09-15 PROCEDURE — A9270 NON-COVERED ITEM OR SERVICE: HCPCS | Performed by: ANESTHESIOLOGY

## 2020-09-15 PROCEDURE — 700101 HCHG RX REV CODE 250: Performed by: ORTHOPAEDIC SURGERY

## 2020-09-15 PROCEDURE — 501838 HCHG SUTURE GENERAL: Performed by: ORTHOPAEDIC SURGERY

## 2020-09-15 PROCEDURE — 160035 HCHG PACU - 1ST 60 MINS PHASE I: Performed by: ORTHOPAEDIC SURGERY

## 2020-09-15 PROCEDURE — 64415 NJX AA&/STRD BRCH PLXS IMG: CPT | Performed by: ORTHOPAEDIC SURGERY

## 2020-09-15 PROCEDURE — 502000 HCHG MISC OR IMPLANTS RC 0278: Performed by: ORTHOPAEDIC SURGERY

## 2020-09-15 PROCEDURE — 700102 HCHG RX REV CODE 250 W/ 637 OVERRIDE(OP): Performed by: ANESTHESIOLOGY

## 2020-09-15 PROCEDURE — 160048 HCHG OR STATISTICAL LEVEL 1-5: Performed by: ORTHOPAEDIC SURGERY

## 2020-09-15 PROCEDURE — A9270 NON-COVERED ITEM OR SERVICE: HCPCS | Performed by: ORTHOPAEDIC SURGERY

## 2020-09-15 PROCEDURE — 502240 HCHG MISC OR SUPPLY RC 0272: Performed by: ORTHOPAEDIC SURGERY

## 2020-09-15 PROCEDURE — 73020 X-RAY EXAM OF SHOULDER: CPT | Mod: LT

## 2020-09-15 PROCEDURE — 501445 HCHG STAPLER, SKIN DISP: Performed by: ORTHOPAEDIC SURGERY

## 2020-09-15 PROCEDURE — C1776 JOINT DEVICE (IMPLANTABLE): HCPCS | Performed by: ORTHOPAEDIC SURGERY

## 2020-09-15 DEVICE — IMPLANTABLE DEVICE: Type: IMPLANTABLE DEVICE | Site: SHOULDER | Status: FUNCTIONAL

## 2020-09-15 DEVICE — BONE CEMENT SIMPLEX ANTIBIO - (10/PK): Type: IMPLANTABLE DEVICE | Site: SHOULDER | Status: FUNCTIONAL

## 2020-09-15 RX ORDER — DIPHENHYDRAMINE HCL 25 MG
25 TABLET ORAL EVERY 6 HOURS PRN
Status: DISCONTINUED | OUTPATIENT
Start: 2020-09-15 | End: 2020-09-15 | Stop reason: HOSPADM

## 2020-09-15 RX ORDER — ATORVASTATIN CALCIUM 10 MG/1
10 TABLET, FILM COATED ORAL EVERY EVENING
Status: DISCONTINUED | OUTPATIENT
Start: 2020-09-15 | End: 2020-09-15 | Stop reason: HOSPADM

## 2020-09-15 RX ORDER — ONDANSETRON 2 MG/ML
INJECTION INTRAMUSCULAR; INTRAVENOUS PRN
Status: DISCONTINUED | OUTPATIENT
Start: 2020-09-15 | End: 2020-09-15 | Stop reason: SURG

## 2020-09-15 RX ORDER — CEFAZOLIN SODIUM 1 G/3ML
INJECTION, POWDER, FOR SOLUTION INTRAMUSCULAR; INTRAVENOUS PRN
Status: DISCONTINUED | OUTPATIENT
Start: 2020-09-15 | End: 2020-09-15 | Stop reason: SURG

## 2020-09-15 RX ORDER — HYDRALAZINE HYDROCHLORIDE 20 MG/ML
5 INJECTION INTRAMUSCULAR; INTRAVENOUS
Status: DISCONTINUED | OUTPATIENT
Start: 2020-09-15 | End: 2020-09-15 | Stop reason: HOSPADM

## 2020-09-15 RX ORDER — DEXAMETHASONE SODIUM PHOSPHATE 4 MG/ML
INJECTION, SOLUTION INTRA-ARTICULAR; INTRALESIONAL; INTRAMUSCULAR; INTRAVENOUS; SOFT TISSUE PRN
Status: DISCONTINUED | OUTPATIENT
Start: 2020-09-15 | End: 2020-09-15 | Stop reason: SURG

## 2020-09-15 RX ORDER — ENEMA 19; 7 G/133ML; G/133ML
1 ENEMA RECTAL
Status: DISCONTINUED | OUTPATIENT
Start: 2020-09-15 | End: 2020-09-15 | Stop reason: HOSPADM

## 2020-09-15 RX ORDER — MAGNESIUM SULFATE HEPTAHYDRATE 500 MG/ML
INJECTION, SOLUTION INTRAMUSCULAR; INTRAVENOUS PRN
Status: DISCONTINUED | OUTPATIENT
Start: 2020-09-15 | End: 2020-09-15 | Stop reason: SURG

## 2020-09-15 RX ORDER — AMOXICILLIN 250 MG
1 CAPSULE ORAL NIGHTLY
Status: DISCONTINUED | OUTPATIENT
Start: 2020-09-15 | End: 2020-09-15 | Stop reason: HOSPADM

## 2020-09-15 RX ORDER — HYDROMORPHONE HYDROCHLORIDE 1 MG/ML
1 INJECTION, SOLUTION INTRAMUSCULAR; INTRAVENOUS; SUBCUTANEOUS
Status: DISCONTINUED | OUTPATIENT
Start: 2020-09-15 | End: 2020-09-15 | Stop reason: HOSPADM

## 2020-09-15 RX ORDER — CELECOXIB 200 MG/1
200 CAPSULE ORAL ONCE
Status: DISCONTINUED | OUTPATIENT
Start: 2020-09-15 | End: 2020-09-15 | Stop reason: HOSPADM

## 2020-09-15 RX ORDER — OXYCODONE HYDROCHLORIDE 30 MG/1
30-60 TABLET ORAL EVERY 4 HOURS PRN
Status: DISCONTINUED | OUTPATIENT
Start: 2020-09-15 | End: 2020-09-15 | Stop reason: HOSPADM

## 2020-09-15 RX ORDER — GLYCOPYRROLATE 0.2 MG/ML
INJECTION INTRAMUSCULAR; INTRAVENOUS PRN
Status: DISCONTINUED | OUTPATIENT
Start: 2020-09-15 | End: 2020-09-15 | Stop reason: SURG

## 2020-09-15 RX ORDER — AMOXICILLIN 250 MG
1 CAPSULE ORAL
Status: DISCONTINUED | OUTPATIENT
Start: 2020-09-15 | End: 2020-09-15 | Stop reason: HOSPADM

## 2020-09-15 RX ORDER — HALOPERIDOL 5 MG/ML
1 INJECTION INTRAMUSCULAR EVERY 6 HOURS PRN
Status: DISCONTINUED | OUTPATIENT
Start: 2020-09-15 | End: 2020-09-15 | Stop reason: HOSPADM

## 2020-09-15 RX ORDER — HALOPERIDOL 5 MG/ML
1 INJECTION INTRAMUSCULAR
Status: DISCONTINUED | OUTPATIENT
Start: 2020-09-15 | End: 2020-09-15 | Stop reason: HOSPADM

## 2020-09-15 RX ORDER — DULOXETIN HYDROCHLORIDE 30 MG/1
60 CAPSULE, DELAYED RELEASE ORAL 2 TIMES DAILY
Status: DISCONTINUED | OUTPATIENT
Start: 2020-09-15 | End: 2020-09-15 | Stop reason: HOSPADM

## 2020-09-15 RX ORDER — SODIUM CHLORIDE, SODIUM LACTATE, POTASSIUM CHLORIDE, CALCIUM CHLORIDE 600; 310; 30; 20 MG/100ML; MG/100ML; MG/100ML; MG/100ML
INJECTION, SOLUTION INTRAVENOUS CONTINUOUS
Status: DISCONTINUED | OUTPATIENT
Start: 2020-09-15 | End: 2020-09-15 | Stop reason: HOSPADM

## 2020-09-15 RX ORDER — SCOLOPAMINE TRANSDERMAL SYSTEM 1 MG/1
1 PATCH, EXTENDED RELEASE TRANSDERMAL
Status: DISCONTINUED | OUTPATIENT
Start: 2020-09-15 | End: 2020-09-15 | Stop reason: HOSPADM

## 2020-09-15 RX ORDER — GABAPENTIN 400 MG/1
800 CAPSULE ORAL 3 TIMES DAILY
Status: DISCONTINUED | OUTPATIENT
Start: 2020-09-15 | End: 2020-09-15 | Stop reason: HOSPADM

## 2020-09-15 RX ORDER — POLYETHYLENE GLYCOL 3350 17 G/17G
1 POWDER, FOR SOLUTION ORAL 2 TIMES DAILY PRN
Status: DISCONTINUED | OUTPATIENT
Start: 2020-09-15 | End: 2020-09-15 | Stop reason: HOSPADM

## 2020-09-15 RX ORDER — CHLORPROMAZINE HYDROCHLORIDE 25 MG/ML
25 INJECTION INTRAMUSCULAR EVERY 6 HOURS PRN
Status: DISCONTINUED | OUTPATIENT
Start: 2020-09-15 | End: 2020-09-15 | Stop reason: HOSPADM

## 2020-09-15 RX ORDER — OXYCODONE HYDROCHLORIDE 5 MG/1
5 TABLET ORAL EVERY 6 HOURS PRN
Status: DISCONTINUED | OUTPATIENT
Start: 2020-09-15 | End: 2020-09-15

## 2020-09-15 RX ORDER — FENTANYL 50 UG/1
1 PATCH TRANSDERMAL
Status: DISCONTINUED | OUTPATIENT
Start: 2020-09-15 | End: 2020-09-15 | Stop reason: HOSPADM

## 2020-09-15 RX ORDER — BUPIVACAINE HYDROCHLORIDE 5 MG/ML
INJECTION, SOLUTION EPIDURAL; INTRACAUDAL PRN
Status: DISCONTINUED | OUTPATIENT
Start: 2020-09-15 | End: 2020-09-15 | Stop reason: SURG

## 2020-09-15 RX ORDER — ACETAMINOPHEN 500 MG
1000 TABLET ORAL ONCE
Status: COMPLETED | OUTPATIENT
Start: 2020-09-15 | End: 2020-09-15

## 2020-09-15 RX ORDER — DIPHENHYDRAMINE HYDROCHLORIDE 50 MG/ML
25 INJECTION INTRAMUSCULAR; INTRAVENOUS EVERY 6 HOURS PRN
Status: DISCONTINUED | OUTPATIENT
Start: 2020-09-15 | End: 2020-09-15 | Stop reason: HOSPADM

## 2020-09-15 RX ORDER — BISACODYL 10 MG
10 SUPPOSITORY, RECTAL RECTAL
Status: DISCONTINUED | OUTPATIENT
Start: 2020-09-15 | End: 2020-09-15 | Stop reason: HOSPADM

## 2020-09-15 RX ORDER — ONDANSETRON 2 MG/ML
4 INJECTION INTRAMUSCULAR; INTRAVENOUS
Status: COMPLETED | OUTPATIENT
Start: 2020-09-15 | End: 2020-09-15

## 2020-09-15 RX ORDER — KETOROLAC TROMETHAMINE 30 MG/ML
15 INJECTION, SOLUTION INTRAMUSCULAR; INTRAVENOUS EVERY 6 HOURS
Status: DISCONTINUED | OUTPATIENT
Start: 2020-09-15 | End: 2020-09-15 | Stop reason: HOSPADM

## 2020-09-15 RX ORDER — DEXTROSE MONOHYDRATE, SODIUM CHLORIDE, AND POTASSIUM CHLORIDE 50; 1.49; 4.5 G/1000ML; G/1000ML; G/1000ML
INJECTION, SOLUTION INTRAVENOUS CONTINUOUS
Status: DISCONTINUED | OUTPATIENT
Start: 2020-09-15 | End: 2020-09-15 | Stop reason: HOSPADM

## 2020-09-15 RX ORDER — DOCUSATE SODIUM 100 MG/1
100 CAPSULE, LIQUID FILLED ORAL 2 TIMES DAILY
Status: DISCONTINUED | OUTPATIENT
Start: 2020-09-15 | End: 2020-09-15 | Stop reason: HOSPADM

## 2020-09-15 RX ORDER — CALCIUM CARBONATE 500 MG/1
500 TABLET, CHEWABLE ORAL
Status: DISCONTINUED | OUTPATIENT
Start: 2020-09-15 | End: 2020-09-15 | Stop reason: HOSPADM

## 2020-09-15 RX ORDER — FLUTICASONE PROPIONATE 50 MCG
1 SPRAY, SUSPENSION (ML) NASAL DAILY
Status: DISCONTINUED | OUTPATIENT
Start: 2020-09-15 | End: 2020-09-15 | Stop reason: HOSPADM

## 2020-09-15 RX ORDER — DIPHENHYDRAMINE HYDROCHLORIDE 50 MG/ML
12.5 INJECTION INTRAMUSCULAR; INTRAVENOUS
Status: COMPLETED | OUTPATIENT
Start: 2020-09-15 | End: 2020-09-15

## 2020-09-15 RX ORDER — CHLORPROMAZINE HYDROCHLORIDE 25 MG/1
25 TABLET, FILM COATED ORAL EVERY 6 HOURS PRN
Status: DISCONTINUED | OUTPATIENT
Start: 2020-09-15 | End: 2020-09-15 | Stop reason: HOSPADM

## 2020-09-15 RX ORDER — GABAPENTIN 300 MG/1
600 CAPSULE ORAL ONCE
Status: COMPLETED | OUTPATIENT
Start: 2020-09-15 | End: 2020-09-15

## 2020-09-15 RX ORDER — ONDANSETRON 2 MG/ML
4 INJECTION INTRAMUSCULAR; INTRAVENOUS EVERY 4 HOURS PRN
Status: DISCONTINUED | OUTPATIENT
Start: 2020-09-15 | End: 2020-09-15 | Stop reason: HOSPADM

## 2020-09-15 RX ORDER — ACETAMINOPHEN 500 MG
1000 TABLET ORAL ONCE
Status: DISCONTINUED | OUTPATIENT
Start: 2020-09-15 | End: 2020-09-15

## 2020-09-15 RX ORDER — MIDAZOLAM HYDROCHLORIDE 1 MG/ML
INJECTION INTRAMUSCULAR; INTRAVENOUS PRN
Status: DISCONTINUED | OUTPATIENT
Start: 2020-09-15 | End: 2020-09-15 | Stop reason: SURG

## 2020-09-15 RX ORDER — NEOSTIGMINE METHYLSULFATE 1 MG/ML
INJECTION, SOLUTION INTRAVENOUS PRN
Status: DISCONTINUED | OUTPATIENT
Start: 2020-09-15 | End: 2020-09-15 | Stop reason: SURG

## 2020-09-15 RX ORDER — PROMETHAZINE HYDROCHLORIDE 25 MG/1
25 TABLET ORAL EVERY 6 HOURS PRN
Status: DISCONTINUED | OUTPATIENT
Start: 2020-09-15 | End: 2020-09-15 | Stop reason: HOSPADM

## 2020-09-15 RX ORDER — KETOROLAC TROMETHAMINE 30 MG/ML
INJECTION, SOLUTION INTRAMUSCULAR; INTRAVENOUS PRN
Status: DISCONTINUED | OUTPATIENT
Start: 2020-09-15 | End: 2020-09-15 | Stop reason: SURG

## 2020-09-15 RX ORDER — DEXAMETHASONE SODIUM PHOSPHATE 4 MG/ML
4 INJECTION, SOLUTION INTRA-ARTICULAR; INTRALESIONAL; INTRAMUSCULAR; INTRAVENOUS; SOFT TISSUE
Status: DISCONTINUED | OUTPATIENT
Start: 2020-09-15 | End: 2020-09-15 | Stop reason: HOSPADM

## 2020-09-15 RX ORDER — ROCURONIUM BROMIDE 10 MG/ML
INJECTION, SOLUTION INTRAVENOUS PRN
Status: DISCONTINUED | OUTPATIENT
Start: 2020-09-15 | End: 2020-09-15 | Stop reason: SURG

## 2020-09-15 RX ORDER — LABETALOL HYDROCHLORIDE 5 MG/ML
5 INJECTION, SOLUTION INTRAVENOUS
Status: DISCONTINUED | OUTPATIENT
Start: 2020-09-15 | End: 2020-09-15 | Stop reason: HOSPADM

## 2020-09-15 RX ORDER — KETAMINE HYDROCHLORIDE 50 MG/ML
INJECTION, SOLUTION INTRAMUSCULAR; INTRAVENOUS PRN
Status: DISCONTINUED | OUTPATIENT
Start: 2020-09-15 | End: 2020-09-15 | Stop reason: SURG

## 2020-09-15 RX ADMIN — FENTANYL CITRATE 50 MCG: 50 INJECTION, SOLUTION INTRAMUSCULAR; INTRAVENOUS at 09:03

## 2020-09-15 RX ADMIN — GABAPENTIN 800 MG: 400 CAPSULE ORAL at 17:07

## 2020-09-15 RX ADMIN — DOCUSATE SODIUM 100 MG: 100 CAPSULE, LIQUID FILLED ORAL at 11:37

## 2020-09-15 RX ADMIN — FENTANYL CITRATE 25 MCG: 50 INJECTION, SOLUTION INTRAMUSCULAR; INTRAVENOUS at 09:10

## 2020-09-15 RX ADMIN — KETAMINE HYDROCHLORIDE 50 MG: 50 INJECTION INTRAMUSCULAR; INTRAVENOUS at 07:02

## 2020-09-15 RX ADMIN — OXYCODONE HYDROCHLORIDE 30 MG: 30 TABLET ORAL at 17:08

## 2020-09-15 RX ADMIN — VANCOMYCIN HYDROCHLORIDE 1000 MG: 500 INJECTION, POWDER, LYOPHILIZED, FOR SOLUTION INTRAVENOUS at 06:34

## 2020-09-15 RX ADMIN — NEOSTIGMINE METHYLSULFATE 4 MG: 1 INJECTION INTRAVENOUS at 08:22

## 2020-09-15 RX ADMIN — DULOXETINE HYDROCHLORIDE 60 MG: 30 CAPSULE, DELAYED RELEASE ORAL at 17:07

## 2020-09-15 RX ADMIN — SODIUM CHLORIDE, POTASSIUM CHLORIDE, SODIUM LACTATE AND CALCIUM CHLORIDE: 600; 310; 30; 20 INJECTION, SOLUTION INTRAVENOUS at 06:36

## 2020-09-15 RX ADMIN — MAGNESIUM SULFATE HEPTAHYDRATE 1 G: 500 INJECTION, SOLUTION INTRAMUSCULAR; INTRAVENOUS at 08:14

## 2020-09-15 RX ADMIN — MAGNESIUM SULFATE HEPTAHYDRATE 1 G: 500 INJECTION, SOLUTION INTRAMUSCULAR; INTRAVENOUS at 08:18

## 2020-09-15 RX ADMIN — MAGNESIUM SULFATE HEPTAHYDRATE 1 G: 500 INJECTION, SOLUTION INTRAMUSCULAR; INTRAVENOUS at 08:16

## 2020-09-15 RX ADMIN — DIPHENHYDRAMINE HYDROCHLORIDE 12.5 MG: 50 INJECTION, SOLUTION INTRAMUSCULAR; INTRAVENOUS at 09:25

## 2020-09-15 RX ADMIN — DEXAMETHASONE SODIUM PHOSPHATE 8 MG: 4 INJECTION, SOLUTION INTRAMUSCULAR; INTRAVENOUS at 07:02

## 2020-09-15 RX ADMIN — SODIUM CHLORIDE 2 G: 9 INJECTION, SOLUTION INTRAVENOUS at 15:27

## 2020-09-15 RX ADMIN — GABAPENTIN 600 MG: 300 CAPSULE ORAL at 06:26

## 2020-09-15 RX ADMIN — KETOROLAC TROMETHAMINE 15 MG: 30 INJECTION, SOLUTION INTRAMUSCULAR at 17:09

## 2020-09-15 RX ADMIN — ROCURONIUM BROMIDE 40 MG: 10 INJECTION, SOLUTION INTRAVENOUS at 07:02

## 2020-09-15 RX ADMIN — ONDANSETRON 4 MG: 2 INJECTION INTRAMUSCULAR; INTRAVENOUS at 07:28

## 2020-09-15 RX ADMIN — MIDAZOLAM HYDROCHLORIDE 4 MG: 1 INJECTION, SOLUTION INTRAMUSCULAR; INTRAVENOUS at 06:45

## 2020-09-15 RX ADMIN — OXYCODONE HYDROCHLORIDE 30 MG: 30 TABLET ORAL at 12:59

## 2020-09-15 RX ADMIN — DIPHENHYDRAMINE HYDROCHLORIDE 12.5 MG: 50 INJECTION, SOLUTION INTRAMUSCULAR; INTRAVENOUS at 09:10

## 2020-09-15 RX ADMIN — KETOROLAC TROMETHAMINE 30 MG: 30 INJECTION, SOLUTION INTRAMUSCULAR at 07:02

## 2020-09-15 RX ADMIN — ACETAMINOPHEN 1000 MG: 500 TABLET, FILM COATED ORAL at 06:26

## 2020-09-15 RX ADMIN — POTASSIUM CHLORIDE, DEXTROSE MONOHYDRATE AND SODIUM CHLORIDE 1000 ML: 150; 5; 450 INJECTION, SOLUTION INTRAVENOUS at 11:49

## 2020-09-15 RX ADMIN — DOCUSATE SODIUM 100 MG: 100 CAPSULE, LIQUID FILLED ORAL at 17:07

## 2020-09-15 RX ADMIN — LIDOCAINE HYDROCHLORIDE 0.5 ML: 10 INJECTION, SOLUTION INFILTRATION; PERINEURAL at 06:28

## 2020-09-15 RX ADMIN — BUPIVACAINE HYDROCHLORIDE 7 ML: 5 INJECTION, SOLUTION EPIDURAL; INTRACAUDAL; PERINEURAL at 06:46

## 2020-09-15 RX ADMIN — CEFAZOLIN 2 G: 1 INJECTION, POWDER, FOR SOLUTION INTRAVENOUS at 07:02

## 2020-09-15 RX ADMIN — BUPIVACAINE 10 ML: 13.3 INJECTION, SUSPENSION, LIPOSOMAL INFILTRATION at 06:46

## 2020-09-15 RX ADMIN — DULOXETINE HYDROCHLORIDE 60 MG: 30 CAPSULE, DELAYED RELEASE ORAL at 11:37

## 2020-09-15 RX ADMIN — GABAPENTIN 800 MG: 400 CAPSULE ORAL at 11:37

## 2020-09-15 RX ADMIN — PROPOFOL 100 MG: 10 INJECTION, EMULSION INTRAVENOUS at 07:02

## 2020-09-15 RX ADMIN — ONDANSETRON 4 MG: 2 INJECTION INTRAMUSCULAR; INTRAVENOUS at 08:53

## 2020-09-15 RX ADMIN — FENTANYL TRANSDERMAL 1 PATCH: 50 PATCH, EXTENDED RELEASE TRANSDERMAL at 11:00

## 2020-09-15 RX ADMIN — FENTANYL CITRATE 25 MCG: 50 INJECTION, SOLUTION INTRAMUSCULAR; INTRAVENOUS at 09:20

## 2020-09-15 RX ADMIN — KETOROLAC TROMETHAMINE 15 MG: 30 INJECTION, SOLUTION INTRAMUSCULAR at 11:38

## 2020-09-15 RX ADMIN — ATORVASTATIN CALCIUM 10 MG: 10 TABLET, FILM COATED ORAL at 17:07

## 2020-09-15 RX ADMIN — GLYCOPYRROLATE 0.4 MG: 0.2 INJECTION, SOLUTION INTRAMUSCULAR; INTRAVENOUS at 08:22

## 2020-09-15 RX ADMIN — HALOPERIDOL LACTATE 1 MG: 5 INJECTION, SOLUTION INTRAMUSCULAR at 09:36

## 2020-09-15 ASSESSMENT — COGNITIVE AND FUNCTIONAL STATUS - GENERAL
STANDING UP FROM CHAIR USING ARMS: A LITTLE
EATING MEALS: A LOT
TURNING FROM BACK TO SIDE WHILE IN FLAT BAD: A LOT
DAILY ACTIVITIY SCORE: 21
HELP NEEDED FOR BATHING: A LOT
CLIMB 3 TO 5 STEPS WITH RAILING: A LOT
MOVING FROM LYING ON BACK TO SITTING ON SIDE OF FLAT BED: A LOT
DAILY ACTIVITIY SCORE: 12
SUGGESTED CMS G CODE MODIFIER MOBILITY: CL
DRESSING REGULAR LOWER BODY CLOTHING: A LOT
DRESSING REGULAR UPPER BODY CLOTHING: A LOT
SUGGESTED CMS G CODE MODIFIER DAILY ACTIVITY: CL
WALKING IN HOSPITAL ROOM: A LOT
HELP NEEDED FOR BATHING: A LITTLE
MOVING TO AND FROM BED TO CHAIR: A LOT
SUGGESTED CMS G CODE MODIFIER DAILY ACTIVITY: CJ
MOBILITY SCORE: 13
PERSONAL GROOMING: A LOT
DRESSING REGULAR UPPER BODY CLOTHING: A LOT
TOILETING: A LOT

## 2020-09-15 ASSESSMENT — PATIENT HEALTH QUESTIONNAIRE - PHQ9
2. FEELING DOWN, DEPRESSED, IRRITABLE, OR HOPELESS: NOT AT ALL
SUM OF ALL RESPONSES TO PHQ9 QUESTIONS 1 AND 2: 0
2. FEELING DOWN, DEPRESSED, IRRITABLE, OR HOPELESS: NOT AT ALL
SUM OF ALL RESPONSES TO PHQ9 QUESTIONS 1 AND 2: 0
1. LITTLE INTEREST OR PLEASURE IN DOING THINGS: NOT AT ALL
1. LITTLE INTEREST OR PLEASURE IN DOING THINGS: NOT AT ALL

## 2020-09-15 ASSESSMENT — LIFESTYLE VARIABLES
TOTAL SCORE: 0
HAVE YOU EVER FELT YOU SHOULD CUT DOWN ON YOUR DRINKING: NO
ON A TYPICAL DAY WHEN YOU DRINK ALCOHOL HOW MANY DRINKS DO YOU HAVE: 0
HOW MANY TIMES IN THE PAST YEAR HAVE YOU HAD 5 OR MORE DRINKS IN A DAY: 0
CONSUMPTION TOTAL: NEGATIVE
TOTAL SCORE: 0
HAVE PEOPLE ANNOYED YOU BY CRITICIZING YOUR DRINKING: NO
EVER FELT BAD OR GUILTY ABOUT YOUR DRINKING: NO
AVERAGE NUMBER OF DAYS PER WEEK YOU HAVE A DRINK CONTAINING ALCOHOL: 0
ALCOHOL_USE: NO
TOTAL SCORE: 0
EVER HAD A DRINK FIRST THING IN THE MORNING TO STEADY YOUR NERVES TO GET RID OF A HANGOVER: NO

## 2020-09-15 ASSESSMENT — ACTIVITIES OF DAILY LIVING (ADL): TOILETING: INDEPENDENT

## 2020-09-15 ASSESSMENT — PAIN SCALES - GENERAL: PAIN_LEVEL: 4

## 2020-09-15 ASSESSMENT — PAIN DESCRIPTION - PAIN TYPE
TYPE: ACUTE PAIN
TYPE: CHRONIC PAIN;SURGICAL PAIN

## 2020-09-15 NOTE — PROGRESS NOTES
Patient arrived to floor via hospital bed. Patient is A/Ox4, pain is 9/10 to left shoulder,  however fingers and arm are numb and patient is unable to wiggle fingers at this time. Immobilizer in place. Ice pack in place. Dressing is CDI. Patient is very anxious at this time. No medications available at this time patient was consolable and breathing exercises were able to calm patient. Fall precautions in place. Call light within in reach. Hourly rounding in place.

## 2020-09-15 NOTE — PROGRESS NOTES
2 Rn skin check complete, skin not WDL. Left total shoulder, dressing CDI. All other skin WDL. See wound flowsheet

## 2020-09-15 NOTE — ANESTHESIA TIME REPORT
Anesthesia Start and Stop Event Times     Date Time Event    9/15/2020 0626 Ready for Procedure     0659 Anesthesia Start     0840 Anesthesia Stop        Responsible Staff  09/15/20    Name Role Begin End    Carl Madrid M.D. Anesth 0659 0840        Preop Diagnosis (Free Text):  Pre-op Diagnosis     PAIN IN LEFT SHOULDER        Preop Diagnosis (Codes):    Post op Diagnosis  Arthritis of left shoulder region      Premium Reason  A. 3PM - 7AM    Comments: pre 0700 block

## 2020-09-15 NOTE — ANESTHESIA PROCEDURE NOTES
Peripheral Block    Date/Time: 9/15/2020 6:46 AM  Performed by: Carl Madrid M.D.  Authorized by: Carl Madrid M.D.     Patient Location:  Pre-op  Start Time:  9/15/2020 6:46 AM  End Time:  9/15/2020 6:48 AM  Reason for Block: at surgeon's request and post-op pain management    patient identified, IV checked, site marked, risks and benefits discussed, surgical consent, monitors and equipment checked, pre-op evaluation and timeout performed    Patient Position:  Supine  Prep: ChloraPrep    Monitoring:  Heart rate, continuous pulse ox and cardiac monitor  Block Region:  Upper Extremity  Upper Extremity - Block Type:  BRACHIAL PLEXUS block, Supraclavicular approach    Laterality:  Left  Procedures: ultrasound guided and nerve stimulator  Image captured, interpreted and electronically stored.  Local Infiltration:  Lidocaine  Strength:  2 %  Dose:  3 ml  Block Type:  Single-shot  Needle Length:  50mm  Needle Gauge:  22 G  Needle Localization:  Ultrasound guidance and nerve stimulator  Injection Assessment:  Negative aspiration for heme, no paresthesia on injection, incremental injection and local visualized surrounding nerve on ultrasound  Evidence of intravascular injection: No     US Guided Supraclavicular Brachial Plexus Block    US transducer placed cephalad and parallel to clavicle in angle to view the subclavian artery with the brachial plexus lateral and superficial to the artery. Needle inserted lateral to the transducer in-plane and advanced with the needle tip visualized continually into the perineural position. After negative aspiration, LA injected without resistance.  Printed and electronic images saved

## 2020-09-15 NOTE — OR NURSING
0837 received from or  resp spont left shoulder dressing c/d/i  Fingers warm  Cap refill<3 sec  Ice pack applied  Brace intact 0900 medicating for pain and nausea prn  0950 pain tolerable  Nausea improved  Meets discharge criteria

## 2020-09-15 NOTE — ANESTHESIA POSTPROCEDURE EVALUATION
Patient: Vaishali Hill Emery    Procedure Summary     Date: 09/15/20 Room / Location:  OR  / SURGERY [facility]    Anesthesia Start: 0659 Anesthesia Stop: 0840    Procedures:       ARTHROPLASTY, SHOULDER, TOTAL (Left Shoulder)      TENODESIS, BICEPS - AND ADDISON (Left Arm Upper) Diagnosis: (PAIN IN LEFT SHOULDER)    Surgeon: Juventino Greene M.D. Responsible Provider: Carl Madrid M.D.    Anesthesia Type: general, peripheral nerve block ASA Status: 3          Final Anesthesia Type: general, peripheral nerve block  Last vitals  BP   Blood Pressure : 109/57    Temp   36.8 °C (98.3 °F)    Pulse   Pulse: 65   Resp   18    SpO2   95 %      Anesthesia Post Evaluation    Patient location during evaluation: PACU  Patient participation: complete - patient participated  Level of consciousness: awake  Pain score: 4    Airway patency: patent  Anesthetic complications: no  Cardiovascular status: hemodynamically stable  Respiratory status: acceptable  Hydration status: euvolemic    PONV: none           Nurse Pain Score: 5 (NPRS)

## 2020-09-15 NOTE — THERAPY
"Occupational Therapy   Initial Evaluation     Patient Name: Vaishali Emery  Age:  65 y.o., Sex:  female  Medical Record #: [id number]  Today's Date: 9/15/2020     Precautions  Precautions: Non Weight Bearing Left Upper Extremity, Immobilizer Left Upper Extremity  Comments: No shoulder ROM    Assessment  Patient is 65 y.o. female admitted for left total shoulder arthroplasty. Pt educated in detail on ADL's after left Total Shoulder surgery.     Patient specifically educated on surgeon's post-operative precautions including NO shoulder ROM or pendulum exercises until cleared by physician.    Summary of education completed:  How to adjust immobilizer for best fit.  · To keep immobilizer positioned so hand is level or slightly above elbow to reduce pull of gravity on arm and maintain shoulder in neutral positioning.  How to don & doff immobilizer safely and appropriately for dressing and bathing.  How to dress upper body while maintaining post surgical precautions.  How to shower safely and safe shower and toilet transfers.  Proper positioning while sleeping either in bed or recliner  · Encouraged patient to support arm with pillows under forearm when sitting to reduce strain on the neck from the weight of the arm and immobilizer.   Proper bed mobility and transfer techniques while maintaining NWB on surgical arm.  Proper positioning of arm for gentle wrist/hand ROM and passive elbow extension.        Pt verbalized and demonstrated understanding of education provided.    Plan    Recommend Occupational Therapy for Evaluation only     DC Equipment Recommendations: None  Discharge Recommendations: Anticipate that the patient will have no further occupational therapy needs after discharge from the hospital     Subjective    \"My arm feels dead. It's so strange.\"     Objective       09/15/20 1515   Prior Living Situation   Prior Services None   Housing / Facility 1 Story House   Steps Into Home 3   Steps In Home 0 " "  Bathroom Set up Walk In Shower   Equipment Owned None   Lives with - Patient's Self Care Capacity Parents;Adult Children   Comments Pt lives with her 91 year old mother and her adult son. Pt reports her mom is in good health.    Prior Level of ADL Function   Self Feeding Independent   Grooming / Hygiene Independent   Bathing Independent   Dressing Independent   Toileting Independent   Prior Level of IADL Function   Medication Management Independent   Laundry Independent   Kitchen Mobility Independent   Finances Independent   Home Management Independent   Shopping Independent   Prior Level Of Mobility Independent Without Device in Community   Occupation (Pre-Hospital Vocational) Retired Due To Age   Cognition    Comments pleasant and cooperative, hard of hearing    Active ROM Upper Body   Active ROM Upper Body  X   Dominant Hand Right   Comments pt's LUE NT as pt in shoulder immobilizer with order for no ROM. Pt with no active movement in left wrist, hand or elbow following nerve block    Strength Upper Body   Comments RUE WNL, LUE NT    Sensation Upper Body   Comments LUE numb \"My arm feels dead. It's so strange\"   Balance Assessment   Sitting Balance (Static) Good   Sitting Balance (Dynamic) Good   Standing Balance (Static) Fair +   Standing Balance (Dynamic) Fair   Weight Shift Sitting Good   Weight Shift Standing Good   Comments no AD    Bed Mobility    Supine to Sit Supervised   Sit to Supine Supervised   Scooting Supervised   Comments HOB slightly elevated    ADL Assessment   Upper Body Dressing Minimal Assist  (night gown, cardigan sweater )   Lower Body Dressing Supervision   Toileting Supervision   Functional Mobility   Sit to Stand Supervised   Bed, Chair, Wheelchair Transfer Supervised   Toilet Transfers Supervised   Mobility walked in room and hallway with supervision                  "

## 2020-09-15 NOTE — DISCHARGE INSTRUCTIONS
Discharge Instructions    Discharged to home by car with relative. Discharged via wheelchair, hospital escort: Yes.  Special equipment needed: Immobilizer    Be sure to schedule a follow-up appointment with your primary care doctor or any specialists as instructed.     Discharge Plan:   Diet Plan: Discussed  Activity Level: Discussed  Confirmed Follow up Appointment: Appointment Scheduled  Confirmed Symptoms Management: Discussed  Medication Reconciliation Updated: Yes    I understand that a diet low in cholesterol, fat, and sodium is recommended for good health. Unless I have been given specific instructions below for another diet, I accept this instruction as my diet prescription.   Other diet: As tolerated    Special Instructions: Discharge instructions for the Orthopedic Patient    Follow up with Primary Care Physician within 2 weeks of discharge to home, regarding:  Review of medications and diagnostic testing.  Surveillance for medical complications.  Workup and treatment of osteoporosis, if appropriate.     -Is this a Hip/Knee/Shoulder Joint Replacement patient? Yes   SHOULDER REPLACEMENT, AFTER-CARE GUIDELINES     These instructions provide you with information on caring for yourself and your shoulder after surgery. Your health care provider may also give you instructions that are more specific. Your treatment has been planned and performed according to current medical practices but problems sometimes occur. Call your health care provider if you have any problems or questions.     WHAT TO EXPECT AFTER THE PROCEDURE   After your procedure, your shoulder and arm will typically be stiff, sore, and bruised. This will improve over time.     HOME CARE INSTRUCTIONS   · Follow your home pain management plan as discussed with your nurse and as directed by your provider.   · It is important to follow any scheduled pain medications as directed for maximal pain relief.   · If prescribed opioid medication, the goal is to  use opioids ONLY IF NEEDED and to wean off prescription pain medicine as soon as possible.   · Apply ice to the injured area for several days after surgery:   · Put ice in a plastic bag.   · Place a towel between your skin and the bag.   · Leave the ice on for 20 minutes, 2-3 times a day at a minimum.   · You may resume your normal diet and activities as directed by your provider.   · Your arm will be in a sling. You will need to wear this for 4-6 weeks after surgery.   · It may be more comfortable to sleep in a recliner for several days or weeks after surgery.   · Do not use your arm to push yourself up in bed or from a chair.   · If prescribed a home exercise plan, follow the program of home exercises as suggested by your provider and/or occupational therapist. Do the exercises at least 3x daily as directed.   · Do not lift anything heavier than a cup of coffee for the first 6 weeks after surgery.   · Ask for help. If you do not have adequate assistance at home, please seek advice from your provider about home care or other services.   · Change dressings only if necessary and as directed. Keep wound dry and covered until otherwise directed by your provider.   · Make sure that you have a follow-up appointment with your provider after surgery.     SEEK URGENT MEDICAL CARE IF:   · You have redness, swelling, or increased pain at your operative site.   · You see pus coming from the wound.   · You have a fever greater than 100.5° F.   · You notice a bad smell coming from the wound or dressing.   · The edges of the wound break open after suture or staple removal.   · You have increasing pain with movement of the shoulder.   · You develop a rash.   · You have chest pain or shortness of breath.     This information is not intended to replace advice given to you by your health care provider. Please discuss any specific questions you have with your health care provider.       -Is this patient being discharged with  medication to prevent blood clots?  Yes, Aspirin Aspirin, ASA oral tablets  What is this medicine?  ASPIRIN (AS pir in) is a pain reliever. It is used to treat mild pain and fever. This medicine is also used as directed by a doctor to prevent and to treat heart attacks, to prevent strokes and blood clots, and to treat arthritis or inflammation.  This medicine may be used for other purposes; ask your health care provider or pharmacist if you have questions.  COMMON BRAND NAME(S): Aspir-Low, Aspir-Tamiko, Aspirtab, Sophy Advanced Aspirin, Sophy Aspirin, Sophy Aspirin Extra Strength, Sophy Aspirin Plus, Sophy Extra Strength, Sophy Extra Strength Plus, Sophy Genuine Aspirin, Sophy Womens Aspirin, Bufferin, Bufferin Extra Strength, Bufferin Low Dose  What should I tell my health care provider before I take this medicine?  They need to know if you have any of these conditions:  · anemia  · asthma  · bleeding problems  · child with chickenpox, the flu, or other viral infection  · diabetes  · gout  · if you frequently drink alcohol containing drinks  · kidney disease  · liver disease  · low level of vitamin K  · lupus  · smoke tobacco  · stomach ulcers or other problems  · an unusual or allergic reaction to aspirin, tartrazine dye, other medicines, dyes, or preservatives  · pregnant or trying to get pregnant  · breast-feeding  How should I use this medicine?  Take this medicine by mouth with a glass of water. Follow the directions on the package or prescription label. You can take this medicine with or without food. If it upsets your stomach, take it with food. Do not take your medicine more often than directed.  Talk to your pediatrician regarding the use of this medicine in children. While this drug may be prescribed for children as young as 12 years of age for selected conditions, precautions do apply. Children and teenagers should not use this medicine to treat chicken pox or flu symptoms unless directed by a  doctor.  Patients over 65 years old may have a stronger reaction and need a smaller dose.  Overdosage: If you think you have taken too much of this medicine contact a poison control center or emergency room at once.  NOTE: This medicine is only for you. Do not share this medicine with others.  What if I miss a dose?  If you are taking this medicine on a regular schedule and miss a dose, take it as soon as you can. If it is almost time for your next dose, take only that dose. Do not take double or extra doses.  What may interact with this medicine?  Do not take this medicine with any of the following medications:  · cidofovir  · ketorolac  · probenecid  This medicine may also interact with the following medications:  · alcohol  · alendronate  · bismuth subsalicylate  · flavocoxid  · herbal supplements like feverfew, garlic, fernando, ginkgo biloba, horse chestnut  · medicines for diabetes or glaucoma like acetazolamide, methazolamide  · medicines for gout  · medicines that treat or prevent blood clots like enoxaparin, heparin, ticlopidine, warfarin  · other aspirin and aspirin-like medicines  · NSAIDs, medicines for pain and inflammation, like ibuprofen or naproxen  · pemetrexed  · sulfinpyrazone  · varicella live vaccine  This list may not describe all possible interactions. Give your health care provider a list of all the medicines, herbs, non-prescription drugs, or dietary supplements you use. Also tell them if you smoke, drink alcohol, or use illegal drugs. Some items may interact with your medicine.  What should I watch for while using this medicine?  If you are treating yourself for pain, tell your doctor or health care professional if the pain lasts more than 10 days, if it gets worse, or if there is a new or different kind of pain. Tell your doctor if you see redness or swelling. Also, check with your doctor if you have a fever that lasts for more than 3 days. Only take this medicine to prevent heart attacks or  blood clotting if prescribed by your doctor or health care professional.  Do not take aspirin or aspirin-like medicines with this medicine. Too much aspirin can be dangerous. Always read the labels carefully.  This medicine can irritate your stomach or cause bleeding problems. Do not smoke cigarettes or drink alcohol while taking this medicine. Do not lie down for 30 minutes after taking this medicine to prevent irritation to your throat.  If you are scheduled for any medical or dental procedure, tell your healthcare provider that you are taking this medicine. You may need to stop taking this medicine before the procedure.  This medicine may be used to treat migraines. If you take migraine medicines for 10 or more days a month, your migraines may get worse. Keep a diary of headache days and medicine use. Contact your healthcare professional if your migraine attacks occur more frequently.  What side effects may I notice from receiving this medicine?  Side effects that you should report to your doctor or health care professional as soon as possible:  · allergic reactions like skin rash, itching or hives, swelling of the face, lips, or tongue  · breathing problems  · changes in hearing, ringing in the ears  · confusion  · general ill feeling or flu-like symptoms  · pain on swallowing  · redness, blistering, peeling or loosening of the skin, including inside the mouth or nose  · signs and symptoms of bleeding such as bloody or black, tarry stools; red or dark-brown urine; spitting up blood or brown material that looks like coffee grounds; red spots on the skin; unusual bruising or bleeding from the eye, gums, or nose  · trouble passing urine or change in the amount of urine  · unusually weak or tired  · yellowing of the eyes or skin  Side effects that usually do not require medical attention (report to your doctor or health care professional if they continue or are bothersome):  · diarrhea or  constipation  · headache  · nausea, vomiting  · stomach gas, heartburn  This list may not describe all possible side effects. Call your doctor for medical advice about side effects. You may report side effects to FDA at 4-458-GDH-2825.  Where should I keep my medicine?  Keep out of the reach of children.  Store at room temperature between 15 and 30 degrees C (59 and 86 degrees F). Protect from heat and moisture. Do not use this medicine if it has a strong vinegar smell. Throw away any unused medicine after the expiration date.  NOTE: This sheet is a summary. It may not cover all possible information. If you have questions about this medicine, talk to your doctor, pharmacist, or health care provider.  © 2020 [employer]/Gold Standard (2018-01-30 10:42:13)      · Is patient discharged on Warfarin / Coumadin?   No     Depression / Suicide Risk    As you are discharged from this [facility] facility, it is important to learn how to keep safe from harming yourself.    Recognize the warning signs:  · Abrupt changes in personality, positive or negative- including increase in energy   · Giving away possessions  · Change in eating patterns- significant weight changes-  positive or negative  · Change in sleeping patterns- unable to sleep or sleeping all the time   · Unwillingness or inability to communicate  · Depression  · Unusual sadness, discouragement and loneliness  · Talk of wanting to die  · Neglect of personal appearance   · Rebelliousness- reckless behavior  · Withdrawal from people/activities they love  · Confusion- inability to concentrate     If you or a loved one observes any of these behaviors or has concerns about self-harm, here's what you can do:  · Talk about it- your feelings and reasons for harming yourself  · Remove any means that you might use to hurt yourself (examples: pills, rope, extension cords, firearm)  · Get professional help from the community (Mental Health, Substance Abuse, psychological  counseling)  · Do not be alone:Call your Safe Contact- someone whom you trust who will be there for you.  · Call your local CRISIS HOTLINE 682-9244 or 078-585-1261  · Call your local Children's Mobile Crisis Response Team [place] (182) 227-1635 or www.[employer].com  · Call the toll free National Suicide Prevention Hotlines   · National Suicide Prevention Lifeline 890-139-NEDP (5220)  · National Hope Line Network 800-SUICIDE (876-7698)

## 2020-09-15 NOTE — ANESTHESIA PROCEDURE NOTES
Airway    Date/Time: 9/15/2020 7:03 AM  Performed by: Carl Madrid M.D.  Authorized by: Carl Madrid M.D.     Location:  OR  Urgency:  Elective  Difficult Airway: No    Indications for Airway Management:  Anesthesia      Spontaneous Ventilation: absent    Sedation Level:  Deep  Preoxygenated: Yes    Patient Position:  Sniffing  MILS Maintained Throughout: No    Mask Difficulty Assessment:  0 - not attempted  Final Airway Type:  Endotracheal airway  Final Endotracheal Airway:  ETT  Cuffed: Yes    Technique Used for Successful ETT Placement:  Direct laryngoscopy    Insertion Site:  Oral  Blade Type:  Kenzie  Laryngoscope Blade/Videolaryngoscope Blade Size:  3  ETT Size (mm):  7.0  Measured from:  Gums  ETT to Gums (cm):  18  Placement Verified by: auscultation and capnometry    Cormack-Lehane Classification:  Grade I - full view of glottis  Number of Attempts at Approach:  1

## 2020-09-15 NOTE — OP REPORT
DATE OF SERVICE:  09/15/2020    PREOPERATIVE DIAGNOSES:  Left shoulder glenohumeral degenerative joint disease   and proximal biceps pathology.    POSTOPERATIVE DIAGNOSES:  Left shoulder glenohumeral degenerative joint   disease and proximal biceps pathology.    PROCEDURES:  1.  Left total shoulder arthroplasty.  2.  Left proximal biceps tenodesis.    SURGEON:  Juventino Greene MD    ASSISTANT:  Jessica Paiz PA-C    ANESTHESIA:  General and interscalene nerve block.    ANESTHESIOLOGIST:  Carl Madrid MD    IMPLANTS:  Tornier Aequalis Ascend Flex size 2C ingrowth humeral stem, a 43x16   mm high-offset humeral head, a small 35-degree keeled glenoid component.    COMPLICATIONS:  None.    DISPOSITION:  Stable to the postanesthesia care unit.    INDICATIONS:  The patient has had progressive left shoulder pain which has   been unresponsive to conservative management.  The risks, benefits,   alternatives and limitations of surgical intervention were discussed in   detail.  She expressed understanding and desired to proceed.    PROCEDURE:  The patient and the correct operative extremity were identified in   the preoperative area.  The shoulder was marked.  She was brought to the   operating room and the correct operative extremity was again confirmed.  She   was placed supine on the OR table where she underwent an interscalene nerve   block performed at my request for postop pain control.  She underwent general   anesthesia without complication.  Examination under anesthesia showed full   range of motion, no instability.  The shoulder was prepped with alcohol.  This   was allowed to dry.  The shoulder was then prepped and draped in the usual   sterile fashion using ChloraPrep.  An 8 cm longitudinal incision was then   centered over the deltopectoral interval.  Sharp dissection was carried down   to the level of the deltoid fascia.  The deltopectoral interval was then   developed.  The cephalic vein identified and  protected.  A retractor was   placed beneath the conjoined and the deltoid.  The superior 1 cm of the   pectoralis was released and the biceps was tenodesed to the pectoralis stump.    It was released up proximally through the rotator interval where there was   significant tenosynovitis.  A subscapularis tenotomy was then performed and   the subscapularis tagged for later repair.  The humeral head was exposed.  The   osteophytes were removed from the humeral head.  A humeral head osteotomy was   performed.  The canal was sounded and broached up to a 2C ingrowth humeral   stem.  The humeral head protector was placed.  The glenoid was exposed.  The   hypertrophic labrum and the biceps stump were excised.  Centering drill was   placed and the glenoid was reamed for a small 35-degree keeled glenoid   component.  The offset drill holes were placed, the intervening bone was   removed.  The glenoid was then punched and a trial placed, it fit flush.  We   then removed the trial, vacuum irrigated and dried the bony surfaces while the   cement was vacuum mixed.  We then injected cement within the glenoid vault   impacting it 3 times, then reinjected cement within the glenoid vault,   impacted the real small 35-degree keeled glenoid component, removed all excess   cement, held firm pressure while the cement cured completely while the   shoulder was bathing in a Betadine solution.  We then again copiously   irrigated the wound, ensured we had removed all excess cement, then re-exposed   the humeral head, undertook a trial reduction, had excellent range of motion   and excellent stability, then removed the trials from the humerus, placed five   #2 Ultrabraids through drill tunnels in the lesser tuberosity, then impacted   the real head stem construct at the back table and impacted that within the   metaphyseal, it fit flush, then reduced the humerus, had excellent range of   motion and excellent stability and again irrigated the  wound, then closed the   subscapularis tenotomy and the rotator interval with interrupted #1 Vicryl   suture, oversewing them with the #2 Ultrabraids.  We then again copiously   irrigated the wound, then closed the deltopectoral interval with interrupted   #1 Vicryl suture, the deep subcutaneous was closed with Monocryl.  Skin was   closed with staples.  Sterile dressings were applied.  The patient was placed   into an UltraSling, allowed to awake from anesthesia, transferred to her   hospital cart and taken to the postanesthesia care unit in stable condition.    She tolerated the procedure well.  There were no immediate complications.       ____________________________________     CATA MI MD RD / DENIA    DD:  09/15/2020 08:26:03  DT:  09/15/2020 08:59:25    D#:  [id number]  Job#:  [id number]

## 2020-09-23 ENCOUNTER — HOSPITAL ENCOUNTER (EMERGENCY)
Facility: MEDICAL CENTER | Age: 65
End: 2020-09-23
Attending: EMERGENCY MEDICINE
Payer: MEDICARE

## 2020-09-23 ENCOUNTER — APPOINTMENT (OUTPATIENT)
Dept: RADIOLOGY | Facility: MEDICAL CENTER | Age: 65
End: 2020-09-23
Attending: EMERGENCY MEDICINE
Payer: MEDICARE

## 2020-09-23 VITALS
BODY MASS INDEX: 21.99 KG/M2 | OXYGEN SATURATION: 96 % | DIASTOLIC BLOOD PRESSURE: 60 MMHG | SYSTOLIC BLOOD PRESSURE: 120 MMHG | TEMPERATURE: 98.1 F | HEART RATE: 60 BPM | RESPIRATION RATE: 18 BRPM | HEIGHT: 62 IN | WEIGHT: 119.49 LBS

## 2020-09-23 DIAGNOSIS — R07.89 OTHER CHEST PAIN: ICD-10-CM

## 2020-09-23 LAB
ALBUMIN SERPL BCP-MCNC: 3.7 G/DL (ref 3.2–4.9)
ALBUMIN/GLOB SERPL: 1.3 G/DL
ALP SERPL-CCNC: 81 U/L (ref 30–99)
ALT SERPL-CCNC: <5 U/L (ref 2–50)
ANION GAP SERPL CALC-SCNC: 9 MMOL/L (ref 7–16)
AST SERPL-CCNC: 12 U/L (ref 12–45)
BASOPHILS # BLD AUTO: 1 % (ref 0–1.8)
BASOPHILS # BLD: 0.09 K/UL (ref 0–0.12)
BILIRUB SERPL-MCNC: 0.2 MG/DL (ref 0.1–1.5)
BUN SERPL-MCNC: 11 MG/DL (ref 8–22)
CALCIUM SERPL-MCNC: 8.3 MG/DL (ref 8.4–10.2)
CHLORIDE SERPL-SCNC: 104 MMOL/L (ref 96–112)
CO2 SERPL-SCNC: 26 MMOL/L (ref 20–33)
CREAT SERPL-MCNC: 0.76 MG/DL (ref 0.5–1.4)
EKG IMPRESSION: NORMAL
EOSINOPHIL # BLD AUTO: 0.09 K/UL (ref 0–0.51)
EOSINOPHIL NFR BLD: 1 % (ref 0–6.9)
ERYTHROCYTE [DISTWIDTH] IN BLOOD BY AUTOMATED COUNT: 50.6 FL (ref 35.9–50)
GLOBULIN SER CALC-MCNC: 2.8 G/DL (ref 1.9–3.5)
GLUCOSE SERPL-MCNC: 62 MG/DL (ref 65–99)
HCT VFR BLD AUTO: 32 % (ref 37–47)
HGB BLD-MCNC: 9.9 G/DL (ref 12–16)
INR PPP: 0.92 (ref 0.87–1.13)
LYMPHOCYTES # BLD AUTO: 3.47 K/UL (ref 1–4.8)
LYMPHOCYTES NFR BLD: 39 % (ref 22–41)
MANUAL DIFF BLD: NORMAL
MCH RBC QN AUTO: 29.6 PG (ref 27–33)
MCHC RBC AUTO-ENTMCNC: 30.9 G/DL (ref 33.6–35)
MCV RBC AUTO: 95.5 FL (ref 81.4–97.8)
MONOCYTES # BLD AUTO: 0.89 K/UL (ref 0–0.85)
MONOCYTES NFR BLD AUTO: 10 % (ref 0–13.4)
NEUTROPHILS # BLD AUTO: 4.36 K/UL (ref 2–7.15)
NEUTROPHILS NFR BLD: 49 % (ref 44–72)
NRBC # BLD AUTO: 0 K/UL
NRBC BLD-RTO: 0 /100 WBC
PLATELET # BLD AUTO: 473 K/UL (ref 164–446)
PLATELET BLD QL SMEAR: NORMAL
PMV BLD AUTO: 9 FL (ref 9–12.9)
POLYCHROMASIA BLD QL SMEAR: NORMAL
POTASSIUM SERPL-SCNC: 4.4 MMOL/L (ref 3.6–5.5)
PROT SERPL-MCNC: 6.5 G/DL (ref 6–8.2)
PROTHROMBIN TIME: 12.1 SEC (ref 12–14.6)
RBC # BLD AUTO: 3.35 M/UL (ref 4.2–5.4)
RBC BLD AUTO: PRESENT
SODIUM SERPL-SCNC: 139 MMOL/L (ref 135–145)
TROPONIN T SERPL-MCNC: 10 NG/L (ref 6–19)
VARIANT LYMPHS BLD QL SMEAR: NORMAL
WBC # BLD AUTO: 8.9 K/UL (ref 4.8–10.8)

## 2020-09-23 PROCEDURE — 93971 EXTREMITY STUDY: CPT | Mod: LT

## 2020-09-23 PROCEDURE — 80053 COMPREHEN METABOLIC PANEL: CPT

## 2020-09-23 PROCEDURE — 93005 ELECTROCARDIOGRAM TRACING: CPT | Performed by: EMERGENCY MEDICINE

## 2020-09-23 PROCEDURE — 99284 EMERGENCY DEPT VISIT MOD MDM: CPT

## 2020-09-23 PROCEDURE — A9270 NON-COVERED ITEM OR SERVICE: HCPCS | Performed by: EMERGENCY MEDICINE

## 2020-09-23 PROCEDURE — 85027 COMPLETE CBC AUTOMATED: CPT

## 2020-09-23 PROCEDURE — 700102 HCHG RX REV CODE 250 W/ 637 OVERRIDE(OP): Performed by: EMERGENCY MEDICINE

## 2020-09-23 PROCEDURE — 85007 BL SMEAR W/DIFF WBC COUNT: CPT

## 2020-09-23 PROCEDURE — 71045 X-RAY EXAM CHEST 1 VIEW: CPT

## 2020-09-23 PROCEDURE — 85610 PROTHROMBIN TIME: CPT

## 2020-09-23 PROCEDURE — 84484 ASSAY OF TROPONIN QUANT: CPT

## 2020-09-23 PROCEDURE — 36415 COLL VENOUS BLD VENIPUNCTURE: CPT

## 2020-09-23 RX ORDER — OXYCODONE HYDROCHLORIDE 5 MG/1
20 TABLET ORAL ONCE
Status: COMPLETED | OUTPATIENT
Start: 2020-09-23 | End: 2020-09-23

## 2020-09-23 RX ADMIN — OXYCODONE HYDROCHLORIDE 20 MG: 5 TABLET ORAL at 20:48

## 2020-09-24 NOTE — ED NOTES
Surgical wound cleansed and redressed after ultrasound. Wound is clean and dry, no redness/swelling/drainage noted. Arm immobilizer replaced. CMS intact.    Patient verbalized understanding to plan of care and discharge information. Patient in stable condition. No signs of distress. Patient pain free. Patient ambulatory out of ED to personal vehicle with stable gait with family.

## 2020-09-24 NOTE — DISCHARGE INSTRUCTIONS
Take your pain medicine as needed for pain.  Return for true chest pain, shortness of breath, loss of consciousness or other concerning symptoms.  Follow-up with Dr. Greene.

## 2020-09-24 NOTE — ED PROVIDER NOTES
"ED Provider Note    CHIEF COMPLAINT  Chief Complaint   Patient presents with   • Bleeding/Bruising     Pt post op 1 week for LT rotator cuff repair Son noted hematoma \" hard lump \" Lt triceps area tonight during ROM exercises and worried about  a blood clot   • Chest Pain     LT ant chest x 4 d  Associated with SOB       HPI  Vaishali Emery is a 65 y.o. female who presents with 2 days of pain in the left axilla.  She believes she can feel the swelling there and is concerned about a DVT.  8 days ago she underwent a left shoulder replacement by [facility].  She reported chest pain at triage but by that she means her axilla pain.  Patient does have coronary artery disease and had a four-vessel CABG.  Denies hypertension diabetes dyslipidemia tobacco use or family history of CAD.  She has never had a DVT or PE.  No shortness of breath or fever.    REVIEW OF SYSTEMS  Pertinent positives include: Left axilla pain and mass, CAD, recent surgery.  Pertinent negatives include: Arm swelling, shortness of breath, fever, abdominal pain, vomiting.  10+ systems reviewed and negative.      PAST MEDICAL HISTORY  Past Medical History:   Diagnosis Date   • Anesthesia     \"difficulty breathing\"   • Angina     none currently    • Angina pectoris 6/19/2009   • Arthritis    • Backpain 1998   • Bowel habit changes     constipation   • Breath shortness     hx of, not currently   • CAD (coronary artery disease)    • Cancer ([facility])     skin   • Cervical radiculopathy 4/1/2009   • Chronic pain 6/29/2011   • COPD (chronic obstructive pulmonary disease) ([facility]) 4/1/2009   • Dental disorder     upper/lower dentures   • Depression 4/1/2009   • Dyslipidemia 6/29/2011   • Headache(784.0) 4/1/2009   • Heart burn    • High cholesterol    • History of anemia    • History of cardiac catheterization 4/1/2009   • History of coronary artery bypass graft 4/1/2009   • History of gastrointestinal bleeding 4/1/2009   • History of hypertension " 2009   • History of mixed hyperlipidemia 2009   • Lumbar radiculopathy 2009   • Pneumonia     approximate date   • PONV (postoperative nausea and vomiting)    • Sepsis (HCC)  or    • Sleep apnea     before gastric bypass, no CPAP now   • Snoring    • Unspecified cataract     IOLS       FAMILY HISTORY  Family History   Problem Relation Age of Onset   • Cancer Father         brain       SOCIAL HISTORY  Social History     Tobacco Use   • Smoking status: Former Smoker     Packs/day: 0.50     Years: 40.00     Pack years: 20.00     Types: Cigarettes     Quit date: 2006     Years since quittin.6   • Smokeless tobacco: Never Used   Substance Use Topics   • Alcohol use: No   • Drug use: No     Social History     Substance and Sexual Activity   Drug Use No       SURGICAL HISTORY  Past Surgical History:   Procedure Laterality Date   • PB RECONSTR TOTAL SHOULDER IMPLANT Left 9/15/2020    Procedure: ARTHROPLASTY, SHOULDER, TOTAL;  Surgeon: Juventino Greene M.D.;  Location: [facility];  Service: Orthopedics   • BICEPS TENODESIS Left 9/15/2020    Procedure: TENODESIS, BICEPS - AND ADDISON;  Surgeon: Juventino Greene M.D.;  Location: [facility];  Service: Orthopedics   • HARDWARE REMOVAL NEURO N/A 2016    Procedure: HARDWARE REMOVAL NEURO L3-S1;  Surgeon: Aaron Martines M.D.;  Location: [facility];  Service:    • CERVICAL DISK AND FUSION ANTERIOR  2016    Procedure: CERVICAL DISK AND FUSION ANTERIOR Cervical4-5 WITH PEEK INTERBODY;  Surgeon: Aaron Martines M.D.;  Location: [facility];  Service:    • BASAL CELL EXCISION  2015    Performed by Adán Blake Jr., M.D. at [facility]   • FLAP CLOSURE  2015    Performed by Adán Blake Jr., M.D. at [facility]   • CATARACT EXTRACTION WITH IOL Bilateral    • OTHER ABDOMINAL SURGERY      gastric bypass   • OTHER  2007    neck surgery   • OTHER NEUROLOGICAL SURG   "2007,    6 back surgeries (at ages 13, 18, 20, 37, and 52)   • OTHER CARDIAC SURGERY  2006    bypass x 4 vessels   • ARLEN HOLES         CURRENT MEDICATIONS  No current facility-administered medications for this encounter.      Current Outpatient Medications   Medication Sig Dispense Refill   • DULoxetine (CYMBALTA) 60 MG Cap DR Particles delayed-release capsule Take 1 Cap by mouth 2 Times a Day.     • oxyCODONE (OXY-IR) 30 MG immediate release tablet Take 30-60 mg by mouth every four hours as needed.     • fentaNYL (DURAGESIC) 50 MCG/HR PATCH 72 HR      • atorvastatin (LIPITOR) 10 MG Tab Take 1 Tab by mouth every day. 30 Tab 11   • aspirin EC (ECOTRIN) 81 MG Tablet Delayed Response Take 1 Tab by mouth every day. 100 Tab 3   • fluticasone (FLONASE) 50 MCG/ACT nasal spray Spray 1 Spray in nose every day. 16 g 0   • promethazine (PHENERGAN) 25 MG Tab Take 25 mg by mouth every 6 hours as needed for Nausea/Vomiting.     • calcium carbonate (TUMS) 500 MG Chew Tab Take 500 mg by mouth as needed (For hertburn).     • gabapentin (NEURONTIN) 800 MG tablet Take 800 mg by mouth 3 times a day.         ALLERGIES  Allergies   Allergen Reactions   • Povidone Iodine Rash     rash       PHYSICAL EXAM  VITAL SIGNS: /81   Pulse 80   Temp 36.7 °C (98.1 °F) (Temporal)   Resp 16   Ht 1.575 m (5' 2\")   Wt 54.2 kg (119 lb 7.8 oz)   SpO2 96%   BMI 21.85 kg/m²   Reviewed and hypertensive, no tachycardia, no tachypnea, normal oxygenation room air  Constitutional: Well developed, Well nourished, thin, appears her age.  HENT: Normocephalic, atraumatic, bilateral external ears normal, Wearing mask.   Eyes: PERRLA, conjunctiva pink, no scleral icterus.   Cardiovascular: Regular S1-S2 without murmur, rub, gallop.  No dependent edema.  No definite edema left arm.  Axilla is tender but no palpable mass is obvious..  Respiratory: No rales, rhonchi, wheeze or cough.  Gastrointestinal: Soft, nontender, nondistended, no organomegaly.  Skin: " No erythema, no rash.   Genitourinary:  No costovertebral angle tenderness.   Neurologic: Alert & oriented x 3, cranial nerves 2-12 intact by passive exam.  No focal deficit noted.  Psychiatric: Affect normal, Judgment normal, Mood normal.     DIFFERENTIAL DIAGNOSIS:  DVT, postop pain, doubt myocardial infarction, PE.    EKG  EKG Interpretation 3:07 PM    Interpreted by me.  Indication: Chest pain    Rhythm: normal sinus   Rate: Normal at 77  Axis: normal  Ectopy: none  Conduction: normal  ST/T Waves: Borderline nonspecific changes confined to V2  Q Waves: none  R Wave progression: normal    Clinical Impression: Normal sinus rhythm      RADIOLOGY/PROCEDURES  US-EXTREMITY VENOUS UPPER UNILAT LEFT   Final Result      DX-CHEST-PORTABLE (1 VIEW)   Final Result      1.  Trace right pleural effusion. No consolidation.   2.  Recent left shoulder arthroplasty.      Ultrasound negative for DVT    LABORATORY:  Results for orders placed or performed during the hospital encounter of 09/23/20   CBC with Differential   Result Value Ref Range    WBC 8.9 4.8 - 10.8 K/uL    RBC 3.35 (L) 4.20 - 5.40 M/uL    Hemoglobin 9.9 (L) 12.0 - 16.0 g/dL    Hematocrit 32.0 (L) 37.0 - 47.0 %    MCV 95.5 81.4 - 97.8 fL    MCH 29.6 27.0 - 33.0 pg    MCHC 30.9 (L) 33.6 - 35.0 g/dL    RDW 50.6 (H) 35.9 - 50.0 fL    Platelet Count 473 (H) 164 - 446 K/uL    MPV 9.0 9.0 - 12.9 fL    Neutrophils-Polys 49.00 44.00 - 72.00 %    Lymphocytes 39.00 22.00 - 41.00 %    Monocytes 10.00 0.00 - 13.40 %    Eosinophils 1.00 0.00 - 6.90 %    Basophils 1.00 0.00 - 1.80 %    Nucleated RBC 0.00 /100 WBC    Neutrophils (Absolute) 4.36 2.00 - 7.15 K/uL    Lymphs (Absolute) 3.47 1.00 - 4.80 K/uL    Monos (Absolute) 0.89 (H) 0.00 - 0.85 K/uL    Eos (Absolute) 0.09 0.00 - 0.51 K/uL    Baso (Absolute) 0.09 0.00 - 0.12 K/uL    NRBC (Absolute) 0.00 K/uL   Complete Metabolic Panel (CMP)   Result Value Ref Range    Sodium 139 135 - 145 mmol/L    Potassium 4.4 3.6 - 5.5 mmol/L     Chloride 104 96 - 112 mmol/L    Co2 26 20 - 33 mmol/L    Anion Gap 9.0 7.0 - 16.0    Glucose 62 (L) 65 - 99 mg/dL    Bun 11 8 - 22 mg/dL    Creatinine 0.76 0.50 - 1.40 mg/dL    Calcium 8.3 (L) 8.4 - 10.2 mg/dL    AST(SGOT) 12 12 - 45 U/L    ALT(SGPT) <5 2 - 50 U/L    Alkaline Phosphatase 81 30 - 99 U/L    Total Bilirubin 0.2 0.1 - 1.5 mg/dL    Albumin 3.7 3.2 - 4.9 g/dL    Total Protein 6.5 6.0 - 8.2 g/dL    Globulin 2.8 1.9 - 3.5 g/dL    A-G Ratio 1.3 g/dL   Troponin   Result Value Ref Range    Troponin T 10 6 - 19 ng/L   PT/INR   Result Value Ref Range    PT 12.1 12.0 - 14.6 sec    INR 0.92 0.87 - 1.13       INTERVENTIONS:  Medications   oxyCODONE immediate-release (ROXICODONE) tablet 20 mg (20 mg Oral Given 9/23/20 2048)       COURSE & MEDICAL DECISION MAKING  Well-appearing patient with chronic pain on high-dose oxycodone 30 mg every 4 hours presents with post operative left axilla pain or chest pain.  There is no evidence of DVT or pneumonia.  In 2 days of pain and a normal troponin there is no MI.  Heart score is 3 although this underestimates her risk since he has CAD and is status post four-vessel CABG.  Current pain appears to be postoperative pain..    This patient has borderline or elevated blood pressure as recorded above and was instructed to followup with primary physician for comprehensive blood pressure evaluation and yearly fasting cholesterol assessment according to to CMS protocol.    PLAN:  Continue current pain medication    Return for us of breath, changing chest pain    Juventino Greene M.D.  [address]  [facility] [address]  [phone]            CONDITION: Stable.    FINAL IMPRESSION  1. Other chest pain          Electronically signed by: Jalil Sanders M.D., 9/23/2020 8:36 PM

## 2020-10-30 ENCOUNTER — HOSPITAL ENCOUNTER (OUTPATIENT)
Facility: MEDICAL CENTER | Age: 65
End: 2020-10-30
Attending: PHYSICIAN ASSISTANT
Payer: MEDICARE

## 2020-10-30 PROCEDURE — U0003 INFECTIOUS AGENT DETECTION BY NUCLEIC ACID (DNA OR RNA); SEVERE ACUTE RESPIRATORY SYNDROME CORONAVIRUS 2 (SARS-COV-2) (CORONAVIRUS DISEASE [COVID-19]), AMPLIFIED PROBE TECHNIQUE, MAKING USE OF HIGH THROUGHPUT TECHNOLOGIES AS DESCRIBED BY CMS-2020-01-R: HCPCS

## 2021-03-03 DIAGNOSIS — Z23 NEED FOR VACCINATION: ICD-10-CM

## 2021-03-26 DIAGNOSIS — I10 ESSENTIAL HYPERTENSION: ICD-10-CM

## 2021-03-26 RX ORDER — ATORVASTATIN CALCIUM 10 MG/1
TABLET, FILM COATED ORAL
Qty: 90 TABLET | Refills: 3 | Status: SHIPPED | OUTPATIENT
Start: 2021-03-26 | End: 2023-08-10

## 2021-07-12 RX ORDER — ACETAMINOPHEN 500 MG
TABLET ORAL
Qty: 90 TABLET | Refills: 4 | Status: SHIPPED | OUTPATIENT
Start: 2021-07-12 | End: 2023-08-10

## 2021-08-30 ENCOUNTER — OFFICE VISIT (OUTPATIENT)
Dept: URGENT CARE | Facility: CLINIC | Age: 66
End: 2021-08-30
Payer: MEDICARE

## 2021-08-30 ENCOUNTER — HOSPITAL ENCOUNTER (OUTPATIENT)
Facility: MEDICAL CENTER | Age: 66
End: 2021-08-30
Attending: FAMILY MEDICINE
Payer: MEDICARE

## 2021-08-30 VITALS
DIASTOLIC BLOOD PRESSURE: 58 MMHG | WEIGHT: 128 LBS | HEART RATE: 64 BPM | BODY MASS INDEX: 24.17 KG/M2 | TEMPERATURE: 97.9 F | OXYGEN SATURATION: 96 % | HEIGHT: 61 IN | RESPIRATION RATE: 18 BRPM | SYSTOLIC BLOOD PRESSURE: 90 MMHG

## 2021-08-30 DIAGNOSIS — U07.1 COVID-19: ICD-10-CM

## 2021-08-30 DIAGNOSIS — Z20.818 STREP THROAT EXPOSURE: ICD-10-CM

## 2021-08-30 DIAGNOSIS — M79.10 MYALGIA: ICD-10-CM

## 2021-08-30 DIAGNOSIS — J02.9 PHARYNGITIS, UNSPECIFIED ETIOLOGY: ICD-10-CM

## 2021-08-30 DIAGNOSIS — R50.9 FEVER, UNSPECIFIED FEVER CAUSE: ICD-10-CM

## 2021-08-30 LAB
INT CON NEG: NEGATIVE
INT CON POS: POSITIVE
S PYO AG THROAT QL: NEGATIVE

## 2021-08-30 PROCEDURE — U0005 INFEC AGEN DETEC AMPLI PROBE: HCPCS

## 2021-08-30 PROCEDURE — U0003 INFECTIOUS AGENT DETECTION BY NUCLEIC ACID (DNA OR RNA); SEVERE ACUTE RESPIRATORY SYNDROME CORONAVIRUS 2 (SARS-COV-2) (CORONAVIRUS DISEASE [COVID-19]), AMPLIFIED PROBE TECHNIQUE, MAKING USE OF HIGH THROUGHPUT TECHNOLOGIES AS DESCRIBED BY CMS-2020-01-R: HCPCS

## 2021-08-30 PROCEDURE — 87880 STREP A ASSAY W/OPTIC: CPT | Performed by: FAMILY MEDICINE

## 2021-08-30 PROCEDURE — 99214 OFFICE O/P EST MOD 30 MIN: CPT | Mod: CS | Performed by: FAMILY MEDICINE

## 2021-08-30 RX ORDER — LIDOCAINE HYDROCHLORIDE 20 MG/ML
15 SOLUTION OROPHARYNGEAL EVERY 4 HOURS PRN
Qty: 120 ML | Refills: 0 | Status: SHIPPED | OUTPATIENT
Start: 2021-08-30 | End: 2023-08-10

## 2021-08-30 RX ORDER — PENICILLIN V POTASSIUM 500 MG/1
500 TABLET ORAL 2 TIMES DAILY
Qty: 20 TABLET | Refills: 0 | Status: SHIPPED | OUTPATIENT
Start: 2021-08-30 | End: 2021-09-09

## 2021-08-30 ASSESSMENT — FIBROSIS 4 INDEX: FIB4 SCORE: 0.8

## 2021-08-31 DIAGNOSIS — M79.10 MYALGIA: ICD-10-CM

## 2021-08-31 DIAGNOSIS — R50.9 FEVER, UNSPECIFIED FEVER CAUSE: ICD-10-CM

## 2021-08-31 DIAGNOSIS — J02.9 PHARYNGITIS, UNSPECIFIED ETIOLOGY: ICD-10-CM

## 2021-08-31 LAB — COVID ORDER STATUS COVID19: NORMAL

## 2021-08-31 NOTE — PROGRESS NOTES
"Subjective     Vaishali Emery is a 66 y.o. female who presents with Fever (pt has a fever(103), bodyaches, sore throat x 1 week )            1 week fever tmax 103. ST. Strep exposure. She is partially vaccinated c19 with first dose. Previous c19 infection.  Associated myalgia and fatigue.  Tolerating fluids with normal urine output.  No cough.  No shortness of breath.  No loss of taste or smell.  No other aggravating or alleviating factors.      Review of Systems   Constitutional: Negative for weight loss.   Eyes: Negative for discharge and redness.   Gastrointestinal: Negative for diarrhea, nausea and vomiting.   Musculoskeletal: Negative for joint pain.   Skin: Negative for itching and rash.   Neurological: Positive for headaches.              Objective     BP (!) 90/58 (BP Location: Left arm, Patient Position: Sitting, BP Cuff Size: Adult)   Pulse 64   Temp 36.6 °C (97.9 °F) (Temporal)   Resp 18   Ht 1.549 m (5' 1\")   Wt 58.1 kg (128 lb)   SpO2 96%   BMI 24.19 kg/m²      Physical Exam  Constitutional:       General: She is not in acute distress.     Appearance: She is well-developed.   HENT:      Head: Normocephalic and atraumatic.      Nose: Congestion present.      Mouth/Throat:      Mouth: Mucous membranes are moist.      Pharynx: Posterior oropharyngeal erythema present.   Eyes:      Conjunctiva/sclera: Conjunctivae normal.   Cardiovascular:      Rate and Rhythm: Normal rate and regular rhythm.      Heart sounds: Normal heart sounds. No murmur heard.     Pulmonary:      Effort: Pulmonary effort is normal.      Breath sounds: Normal breath sounds. No wheezing.   Musculoskeletal:      Cervical back: Neck supple.   Lymphadenopathy:      Cervical: No cervical adenopathy.   Skin:     General: Skin is warm and dry.      Findings: No rash.   Neurological:      Mental Status: She is alert and oriented to person, place, and time.                             Assessment & Plan         strep " negative  covid +    1. Fever, unspecified fever cause  POCT Rapid Strep A    COVID/SARS CoV-2 PCR   2. Myalgia  COVID/SARS CoV-2 PCR   3. Pharyngitis, unspecified etiology  POCT Rapid Strep A    COVID/SARS CoV-2 PCR    penicillin v potassium (VEETID) 500 MG Tab    lidocaine (XYLOCAINE) 2 % Solution   4. Strep throat exposure  penicillin v potassium (VEETID) 500 MG Tab   5. COVID-19       Differential diagnosis, natural history, supportive care, and indications for immediate follow-up discussed at length.     Upon disposition on 8/30 determined that her son was another strep exposure and wrote a contingent penicillin antibiotic.  COVID-19 testing subsequently positive and unable to contact patient despite multiple attempts.  At the time of signing this note on 9/4/2021 she has missed the window for Regeneron treatment.  She understood on disposition to continue self-isolation.

## 2021-09-01 LAB
SARS-COV-2 RNA RESP QL NAA+PROBE: DETECTED
SPECIMEN SOURCE: ABNORMAL

## 2021-09-04 ASSESSMENT — ENCOUNTER SYMPTOMS
HEADACHES: 1
NAUSEA: 0
WEIGHT LOSS: 0
EYE REDNESS: 0
DIARRHEA: 0
EYE DISCHARGE: 0
VOMITING: 0

## 2021-09-21 ENCOUNTER — OFFICE VISIT (OUTPATIENT)
Dept: URGENT CARE | Facility: CLINIC | Age: 66
End: 2021-09-21
Payer: MEDICARE

## 2021-09-21 ENCOUNTER — HOSPITAL ENCOUNTER (OUTPATIENT)
Facility: MEDICAL CENTER | Age: 66
End: 2021-09-21
Attending: STUDENT IN AN ORGANIZED HEALTH CARE EDUCATION/TRAINING PROGRAM
Payer: MEDICARE

## 2021-09-21 VITALS
HEIGHT: 61 IN | OXYGEN SATURATION: 98 % | HEART RATE: 65 BPM | BODY MASS INDEX: 22.66 KG/M2 | WEIGHT: 120 LBS | RESPIRATION RATE: 18 BRPM | DIASTOLIC BLOOD PRESSURE: 70 MMHG | SYSTOLIC BLOOD PRESSURE: 138 MMHG | TEMPERATURE: 97.8 F

## 2021-09-21 DIAGNOSIS — R31.29 OTHER MICROSCOPIC HEMATURIA: ICD-10-CM

## 2021-09-21 DIAGNOSIS — R30.0 DYSURIA: ICD-10-CM

## 2021-09-21 LAB
APPEARANCE UR: NORMAL
BILIRUB UR STRIP-MCNC: NORMAL MG/DL
COLOR UR AUTO: NORMAL
GLUCOSE UR STRIP.AUTO-MCNC: NEGATIVE MG/DL
KETONES UR STRIP.AUTO-MCNC: NEGATIVE MG/DL
LEUKOCYTE ESTERASE UR QL STRIP.AUTO: NEGATIVE
NITRITE UR QL STRIP.AUTO: NEGATIVE
PH UR STRIP.AUTO: 5.5 [PH] (ref 5–8)
PROT UR QL STRIP: 100 MG/DL
RBC UR QL AUTO: NORMAL
SP GR UR STRIP.AUTO: 1.03
UROBILINOGEN UR STRIP-MCNC: 1 MG/DL

## 2021-09-21 PROCEDURE — 87086 URINE CULTURE/COLONY COUNT: CPT

## 2021-09-21 PROCEDURE — 99214 OFFICE O/P EST MOD 30 MIN: CPT | Performed by: STUDENT IN AN ORGANIZED HEALTH CARE EDUCATION/TRAINING PROGRAM

## 2021-09-21 PROCEDURE — 81002 URINALYSIS NONAUTO W/O SCOPE: CPT | Performed by: STUDENT IN AN ORGANIZED HEALTH CARE EDUCATION/TRAINING PROGRAM

## 2021-09-21 RX ORDER — SULFAMETHOXAZOLE AND TRIMETHOPRIM 800; 160 MG/1; MG/1
1 TABLET ORAL 2 TIMES DAILY
Qty: 6 TABLET | Refills: 0 | Status: SHIPPED | OUTPATIENT
Start: 2021-09-21 | End: 2021-09-24

## 2021-09-21 ASSESSMENT — FIBROSIS 4 INDEX: FIB4 SCORE: 0.8

## 2021-09-21 NOTE — PROGRESS NOTES
Subjective:   CHIEF COMPLAINT  Chief Complaint   Patient presents with   • UTI     x 2 days       HPI  Vaishali Emery is a 66 y.o. female who presents w/ a c/c of possible UTI. Sx started yesterday. Says she is experiencing dysuria and frequency. She has not tried any medication. She reports previous hx of UTIs and current sx are c/w previous infections. Admits some blood in the urine. No vaginal pruritus or discharge. No flank pain.     REVIEW OF SYSTEMS  General: no fever or chills  GI: Admits nausea and vomiting  See HPI for further details.    PAST MEDICAL HISTORY  Patient Active Problem List    Diagnosis Date Noted   • Complication internal fixation device such as nail, plate, or case ([facility]) 06/23/2016   • Cervical spinal stenosis 04/28/2016   • ACS (acute coronary syndrome) ([facility]) 03/12/2016   • Basal cell carcinoma of skin of other parts of face 02/19/2015   • Hypoxia 02/19/2015   • HTN (hypertension) 10/09/2012   • Right carotid bruit 10/09/2012   • Epigastric pain 08/11/2011   • Leukopenia 08/11/2011   • Chronic pain 06/29/2011   • CAD (coronary artery disease) 06/29/2011   • Dyslipidemia 06/29/2011   • LION (obstructive sleep apnea) 06/29/2011   • History of hypertension 06/19/2009   • History of mixed hyperlipidemia 06/19/2009   • Cervical radiculopathy 04/01/2009   • COPD (chronic obstructive pulmonary disease) ([facility]) 04/01/2009   • Depression 04/01/2009   • Headache 04/01/2009   • Lumbar radiculopathy 04/01/2009   • URINARY INCONTINENCE 04/01/2009   • History of cardiac catheterization 04/01/2009   • History of coronary artery bypass graft 04/01/2009   • History of gastrointestinal bleeding 04/01/2009       SURGICAL HISTORY   has a past surgical history that includes other abdominal surgery (2007); other (2007); other cardiac surgery (2006); basal cell excision (2/19/2015); flap closure (2/19/2015); ritika holes; other neurological surg (2007,); hardware removal neuro (N/A, 6/23/2016); cataract  "extraction with iol (Bilateral, 2015); cervical disk and fusion anterior (4/28/2016); reconstr total shoulder implant (Left, 9/15/2020); and biceps tenodesis (Left, 9/15/2020).    ALLERGIES  Allergies   Allergen Reactions   • Povidone Iodine Rash     rash       CURRENT MEDICATIONS  Home Medications     Reviewed by Claude Chatman't (Medical Assistant) on 09/21/21 at 0857  Med List Status: <None>   Medication Last Dose Status   atorvastatin (LIPITOR) 10 MG Tab Taking Active   calcium carbonate (TUMS) 500 MG Chew Tab Taking Active   [employer] ASPIRIN ADULT LOW STRENGTH 81 MG EC tablet Taking Active   DULoxetine (CYMBALTA) 60 MG Cap DR Particles delayed-release capsule Taking Active   fentaNYL (DURAGESIC) 50 MCG/HR PATCH 72 HR Taking Active   fluticasone (FLONASE) 50 MCG/ACT nasal spray Taking Active   gabapentin (NEURONTIN) 800 MG tablet Taking Active   lidocaine (XYLOCAINE) 2 % Solution Taking Active   oxyCODONE (OXY-IR) 30 MG immediate release tablet Taking Active   promethazine (PHENERGAN) 25 MG Tab Taking Active                SOCIAL HISTORY  Social History     Tobacco Use   • Smoking status: Former Smoker     Packs/day: 0.50     Years: 40.00     Pack years: 20.00     Types: Cigarettes     Quit date: 2/4/2006     Years since quitting: 15.6   • Smokeless tobacco: Never Used   Vaping Use   • Vaping Use: Never used   Substance and Sexual Activity   • Alcohol use: No   • Drug use: No   • Sexual activity: Not on file       FAMILY HISTORY  Family History   Problem Relation Age of Onset   • Cancer Father         brain          Objective:   PHYSICAL EXAM  VITAL SIGNS: /70 (BP Location: Left arm, Patient Position: Sitting, BP Cuff Size: Adult)   Pulse 65   Temp 36.6 °C (97.8 °F) (Temporal)   Resp 18   Ht 1.549 m (5' 1\")   Wt 54.4 kg (120 lb)   SpO2 98%   BMI 22.67 kg/m²     Gen: no acute distress, normal voice  Skin: dry, intact, moist mucosal membranes  Lungs: CTA B w/ symmetric expansion  CV: Regular " rate, irregular rhythm with systolic murmurs  : No CVAT bilaterally  Psych: normal affect, normal judgement, alert, awake    UA: Moderate blood.  Small bili.  Presence of protein.  No nitrites or leukocytes.  Assessment/Plan:     1. Dysuria  sulfamethoxazole-trimethoprim (BACTRIM DS) 800-160 MG tablet    URINE CULTURE(NEW)    POCT Urinalysis   2. Other microscopic hematuria  sulfamethoxazole-trimethoprim (BACTRIM DS) 800-160 MG tablet    URINE CULTURE(NEW)    POCT Urinalysis   Suspect symptoms due to underlying UTI.  -Ordered Bactrim  -2 L H2O daily  -Ordered urine culture.  Only contact patient if need to change/update medications.    Differential diagnosis, natural history, supportive care, and indications for immediate follow-up discussed. All questions answered. Patient agrees with the plan of care.    Follow-up as needed if symptoms worsen or fail to improve to PCP, Urgent care or Emergency Room.     > 1 new diagnosis.  Uncertain prognosis    Please note that this dictation was created using voice recognition software. I have made a reasonable attempt to correct obvious errors, but I expect that there are errors of grammar and possibly content that I did not discover before finalizing the note.

## 2021-09-23 LAB
BACTERIA UR CULT: NORMAL
SIGNIFICANT IND 70042: NORMAL
SITE SITE: NORMAL
SOURCE SOURCE: NORMAL

## 2022-03-05 ENCOUNTER — OFFICE VISIT (OUTPATIENT)
Dept: URGENT CARE | Facility: PHYSICIAN GROUP | Age: 67
End: 2022-03-05
Payer: MEDICARE

## 2022-03-05 ENCOUNTER — APPOINTMENT (OUTPATIENT)
Dept: RADIOLOGY | Facility: IMAGING CENTER | Age: 67
End: 2022-03-05
Attending: PHYSICIAN ASSISTANT
Payer: MEDICARE

## 2022-03-05 VITALS
TEMPERATURE: 98.9 F | WEIGHT: 120 LBS | SYSTOLIC BLOOD PRESSURE: 144 MMHG | HEIGHT: 61 IN | HEART RATE: 90 BPM | DIASTOLIC BLOOD PRESSURE: 92 MMHG | RESPIRATION RATE: 16 BRPM | OXYGEN SATURATION: 97 % | BODY MASS INDEX: 22.66 KG/M2

## 2022-03-05 DIAGNOSIS — M54.16 LUMBAR RADICULOPATHY: ICD-10-CM

## 2022-03-05 PROCEDURE — 99214 OFFICE O/P EST MOD 30 MIN: CPT | Performed by: PHYSICIAN ASSISTANT

## 2022-03-05 PROCEDURE — 72100 X-RAY EXAM L-S SPINE 2/3 VWS: CPT | Mod: TC | Performed by: PHYSICIAN ASSISTANT

## 2022-03-05 RX ORDER — METHYLPREDNISOLONE SODIUM SUCCINATE 125 MG/2ML
62.5 INJECTION, POWDER, LYOPHILIZED, FOR SOLUTION INTRAMUSCULAR; INTRAVENOUS ONCE
Status: COMPLETED | OUTPATIENT
Start: 2022-03-05 | End: 2022-03-05

## 2022-03-05 RX ORDER — PREDNISONE 10 MG/1
40 TABLET ORAL DAILY
Qty: 20 TABLET | Refills: 0 | Status: SHIPPED | OUTPATIENT
Start: 2022-03-05 | End: 2022-03-05

## 2022-03-05 RX ORDER — PREDNISONE 10 MG/1
40 TABLET ORAL DAILY
Qty: 20 TABLET | Refills: 0 | Status: SHIPPED | OUTPATIENT
Start: 2022-03-05 | End: 2022-03-10

## 2022-03-05 RX ADMIN — METHYLPREDNISOLONE SODIUM SUCCINATE 62.5 MG: 125 INJECTION, POWDER, LYOPHILIZED, FOR SOLUTION INTRAMUSCULAR; INTRAVENOUS at 11:52

## 2022-03-05 ASSESSMENT — ENCOUNTER SYMPTOMS
SHORTNESS OF BREATH: 0
DIARRHEA: 0
MYALGIAS: 0
VOMITING: 0
EYE PAIN: 0
FEVER: 0
NAUSEA: 0
HEADACHES: 0
CHILLS: 0
ABDOMINAL PAIN: 0
SORE THROAT: 0
CONSTIPATION: 0
COUGH: 0
BACK PAIN: 1

## 2022-03-05 ASSESSMENT — FIBROSIS 4 INDEX: FIB4 SCORE: 0.8

## 2022-03-05 NOTE — LETTER
March 5, 2022    To Whom It May Concern:         This is confirmation that Vaishali Emery attended her scheduled appointment with Regulo Casas P.A.-C. on 3/05/22.  Please excuse this patient's absence from Tuesday, March 1 through Friday, March 4, 2022 for medical reasons.  For any extended leave for FMLA paperwork the patient must follow-up with primary.         If you have any questions please do not hesitate to call me at the phone number listed below.    Sincerely,          Regulo Casas P.A.-C.  [phone]

## 2022-03-05 NOTE — PROGRESS NOTES
"Subjective:   Vaishali Emery is a 66 y.o. female who presents for Back Pain (Right lower back pain that travels down the leg after falling 3 weeks ago.)      This 66-year-old female with a history of chronic back pain, long history of hardware in the lumbar spine which was actually removed several years ago and history of sciatica who reports that around 3 weeks ago she had a fall.  Secondary to her fall she is continue to have ongoing back pain.  She is on fentanyl patches but has noticed poor control of the radiculopathy shooting down the lateral aspect of the right leg terminating at the knee.  She is not noticed any saddle anesthesia, bladder or bowel incontinence, weakness of the lower extremities.  She is noted any fevers or chills.  She is able to walk.      Review of Systems   Constitutional: Negative for chills and fever.   HENT: Negative for congestion, ear pain and sore throat.    Eyes: Negative for pain.   Respiratory: Negative for cough and shortness of breath.    Cardiovascular: Negative for chest pain.   Gastrointestinal: Negative for abdominal pain, constipation, diarrhea, nausea and vomiting.   Genitourinary: Negative for dysuria.   Musculoskeletal: Positive for back pain. Negative for myalgias.   Skin: Negative for rash.   Neurological: Negative for headaches.       Medications, Allergies, and current problem list reviewed today in Epic.     Objective:     /92   Pulse 90   Temp 37.2 °C (98.9 °F) (Temporal)   Resp 16   Ht 1.549 m (5' 1\")   Wt 54.4 kg (120 lb)   SpO2 97%     Physical Exam  Vitals reviewed.   Constitutional:       Appearance: Normal appearance.   HENT:      Head: Normocephalic and atraumatic.      Right Ear: External ear normal.      Left Ear: External ear normal.      Nose: Nose normal.      Mouth/Throat:      Mouth: Mucous membranes are moist.   Eyes:      Conjunctiva/sclera: Conjunctivae normal.   Cardiovascular:      Rate and Rhythm: Normal rate. "   Pulmonary:      Effort: Pulmonary effort is normal.   Musculoskeletal:      Comments: Mild tenderness over right SI joint with muscle spasm of right paraspinal lumbar region.  Midline surgical scar present, no midline tenderness, step-off or crepitance noted.  2+ symmetrical patellar reflexes bilaterally with 5 out of 5 strength of lower extremities.  Ambulatory with a steady station and gait.  Unable to tolerate straight leg raise   Skin:     General: Skin is warm and dry.      Capillary Refill: Capillary refill takes less than 2 seconds.   Neurological:      Mental Status: She is alert and oriented to person, place, and time.       RADIOLOGY RESULTS   DX-LUMBAR SPINE-2 OR 3 VIEWS    Result Date: 3/5/2022  3/5/2022 11:34 AM HISTORY/REASON FOR EXAM: Back pain. TECHNIQUE/ EXAM DESCRIPTION AND NUMBER OF VIEWS: 3 views of the lumbar spine. COMPARISON: None. FINDINGS: Vertebral body height is well maintained. There is no evidence of fracture. There are surgical changes seen at L4-5 and L5-S1. There are residual pedicular screws extending into L5 with metallic cage placement at L4-5 and L5-S1. Cages do appear to be somewhat anteriorly located at the L5-S1 level. No evidence of acute compression fracture. There is osteopenia. There is multilevel degenerative disc disease and facet degeneration. There is likely been prior laminectomy. There is vascular calcification.     1.  Surgical changes at L4-5 and L5-S1 as described above. 2.  Multilevel degenerative disc disease and facet degeneration.             Assessment/Plan:     Diagnosis and associated orders:     1. Lumbar radiculopathy  DX-LUMBAR SPINE-2 OR 3 VIEWS    methylPREDNISolone sod succ (SOLU-MEDROL) 125 MG injection 62.5 mg    predniSONE (DELTASONE) 10 MG Tab    DISCONTINUED: predniSONE (DELTASONE) 10 MG Tab      Comments/MDM:     • Given patient's history of surgery and traumatic injury 3 weeks ago and being refractory to conservative management I did perform  an x-ray to assess for any obvious changes.  Although the x-ray is a limited study I was looking for any obvious changes that would suggest a hardware degradation or obvious malalignment.  Thankfully none of these were witnessed.  Patient does seem to be having a exacerbation of her radiculopathy I recommended Solu-Medrol in clinic followed by steroids starting tomorrow.  She is already fairly optimized and has lots of pain control options at home.  Recommend behavior modification, gentle stretching and follow-up with her spine specialist if she continues to have symptoms.  At this time no signs of cord compression syndrome, spinal epidural abscess or more serious etiology         Differential diagnosis, natural history, supportive care, and indications for immediate follow-up discussed.    Advised the patient to follow-up with the primary care physician for recheck, reevaluation, and consideration of further management.    Please note that this dictation was created using voice recognition software. I have made a reasonable attempt to correct obvious errors, but I expect that there are errors of grammar and possibly content that I did not discover before finalizing the note.    This note was electronically signed by Regulo Casas PA-C

## 2022-04-20 ENCOUNTER — OFFICE VISIT (OUTPATIENT)
Dept: URGENT CARE | Facility: PHYSICIAN GROUP | Age: 67
End: 2022-04-20
Payer: MEDICARE

## 2022-04-20 VITALS
RESPIRATION RATE: 20 BRPM | BODY MASS INDEX: 23.53 KG/M2 | HEIGHT: 61 IN | DIASTOLIC BLOOD PRESSURE: 86 MMHG | HEART RATE: 79 BPM | TEMPERATURE: 96.5 F | WEIGHT: 124.6 LBS | OXYGEN SATURATION: 98 % | SYSTOLIC BLOOD PRESSURE: 132 MMHG
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
## 2022-04-20 DIAGNOSIS — S30.0XXA TRAUMATIC ECCHYMOSIS OF BUTTOCK, INITIAL ENCOUNTER: ICD-10-CM

## 2022-04-20 DIAGNOSIS — M54.50 ACUTE LEFT-SIDED LOW BACK PAIN WITHOUT SCIATICA: ICD-10-CM

## 2022-04-20 DIAGNOSIS — M25.552 LEFT HIP PAIN: ICD-10-CM

## 2022-04-20 LAB
APPEARANCE UR: CLEAR
BILIRUB UR STRIP-MCNC: NEGATIVE MG/DL
COLOR UR AUTO: NORMAL
GLUCOSE UR STRIP.AUTO-MCNC: NEGATIVE MG/DL
KETONES UR STRIP.AUTO-MCNC: NORMAL MG/DL
LEUKOCYTE ESTERASE UR QL STRIP.AUTO: NEGATIVE
NITRITE UR QL STRIP.AUTO: NEGATIVE
PH UR STRIP.AUTO: 7.5 [PH] (ref 5–8)
PROT UR QL STRIP: NEGATIVE MG/DL
RBC UR QL AUTO: NEGATIVE
SP GR UR STRIP.AUTO: 1.02
UROBILINOGEN UR STRIP-MCNC: 1 MG/DL

## 2022-04-20 PROCEDURE — 81002 URINALYSIS NONAUTO W/O SCOPE: CPT | Performed by: NURSE PRACTITIONER

## 2022-04-20 PROCEDURE — 99214 OFFICE O/P EST MOD 30 MIN: CPT | Performed by: NURSE PRACTITIONER

## 2022-04-20 RX ORDER — METHYLPREDNISOLONE 4 MG/1
TABLET ORAL
Qty: 21 TABLET | Refills: 0 | Status: SHIPPED | OUTPATIENT
Start: 2022-04-20 | End: 2023-08-10

## 2022-04-20 RX ORDER — KETOROLAC TROMETHAMINE 30 MG/ML
30 INJECTION, SOLUTION INTRAMUSCULAR; INTRAVENOUS ONCE
Status: COMPLETED | OUTPATIENT
Start: 2022-04-20 | End: 2022-04-20

## 2022-04-20 RX ADMIN — KETOROLAC TROMETHAMINE 30 MG: 30 INJECTION, SOLUTION INTRAMUSCULAR; INTRAVENOUS at 17:16

## 2022-04-20 ASSESSMENT — ENCOUNTER SYMPTOMS
FALLS: 1
DIARRHEA: 0
PALPITATIONS: 0
HEADACHES: 0
FEVER: 0
CHILLS: 0
BACK PAIN: 1
SENSORY CHANGE: 0
WEAKNESS: 0
MYALGIAS: 1
DIZZINESS: 0
ORTHOPNEA: 0
TINGLING: 0
VOMITING: 0
FLANK PAIN: 0
NAUSEA: 0
ABDOMINAL PAIN: 0
SHORTNESS OF BREATH: 0
FOCAL WEAKNESS: 0
CONSTIPATION: 0

## 2022-04-20 ASSESSMENT — FIBROSIS 4 INDEX: FIB4 SCORE: 0.8

## 2022-04-20 NOTE — PROGRESS NOTES
Subjective     Vaishali Emery is a 66 y.o. female who presents with Fall (Landed on back, butt, left leg, was seen at a [facility] [place], [place] 2 nights ago, left buttocks is swollen a bruised, hurts to walk and sit down X 2 days)            HPI   fell at home in kitchen in middle of night. Felt drowsy and fell backwards. Does take Oxycodone, Fentanyl and Baclofen for chronic back pain.  was seen at [facility] ER 2 days ago and [facility] CT done on back and there were no fractures in back. States given no instructions for care for her back. No pain radiates down leg. No noted problems with urination or bowel movements.     PMH:  has a past medical history of Anesthesia, Angina, Angina pectoris (6/19/2009), Arthritis, Backpain (1998), Bowel habit changes, Breath shortness, CAD (coronary artery disease), Cancer ([facility]), Cervical radiculopathy (4/1/2009), Chronic pain (6/29/2011), COPD (chronic obstructive pulmonary disease) ([facility]) (4/1/2009), Dental disorder, Depression (4/1/2009), Dyslipidemia (6/29/2011), Headache(784.0) (4/1/2009), Heart burn, High cholesterol, History of anemia, History of cardiac catheterization (4/1/2009), History of coronary artery bypass graft (4/1/2009), History of gastrointestinal bleeding (4/1/2009), History of hypertension (6/19/2009), History of mixed hyperlipidemia (6/19/2009), Lumbar radiculopathy (4/1/2009), Pneumonia (1990), PONV (postoperative nausea and vomiting), Sepsis ([facility]) (2006 or 2007), Sleep apnea, Snoring, and Unspecified cataract.  MEDS:   Current Outpatient Medications:   •  methylPREDNISolone (MEDROL DOSEPAK) 4 MG Tablet Therapy Pack, Follow schedule on package instructions., Disp: 21 Tablet, Rfl: 0  •  lidocaine (XYLOCAINE) 2 % Solution, Take 15 mL by mouth every four hours as needed for Throat/Mouth Pain. Gargle and spit every 4 hours as needed, Disp: 120 mL, Rfl: 0  •  [employer] ASPIRIN ADULT LOW STRENGTH 81 MG EC tablet, TAKE 1 TABLET BY MOUTH EVERY DAY, Disp:  90 tablet, Rfl: 4  •  DULoxetine (CYMBALTA) 60 MG Cap DR Particles delayed-release capsule, Take 1 Cap by mouth 2 Times a Day., Disp: , Rfl:   •  oxyCODONE (OXY-IR) 30 MG immediate release tablet, Take 30-60 mg by mouth every four hours as needed., Disp: , Rfl:   •  fentaNYL (DURAGESIC) 50 MCG/HR PATCH 72 HR, , Disp: , Rfl:   •  fluticasone (FLONASE) 50 MCG/ACT nasal spray, Spray 1 Spray in nose every day., Disp: 16 g, Rfl: 0  •  promethazine (PHENERGAN) 25 MG Tab, Take 25 mg by mouth every 6 hours as needed for Nausea/Vomiting., Disp: , Rfl:   •  calcium carbonate (TUMS) 500 MG Chew Tab, Take 500 mg by mouth as needed (For hertburn)., Disp: , Rfl:   •  gabapentin (NEURONTIN) 800 MG tablet, Take 800 mg by mouth 3 times a day., Disp: , Rfl:   •  atorvastatin (LIPITOR) 10 MG Tab, TAKE 1 TABLET BY MOUTH EVERY DAY (Patient not taking: Reported on 4/20/2022), Disp: 90 tablet, Rfl: 3    Current Facility-Administered Medications:   •  ketorolac (TORADOL) injection 30 mg, 30 mg, Intramuscular, Once, JESSICA SepulvedaP.R.NKatie  ALLERGIES:   Allergies   Allergen Reactions   • Povidone Iodine    • Povidone Iodine Rash     rash     SURGHX:   Past Surgical History:   Procedure Laterality Date   • PB RECONSTR TOTAL SHOULDER IMPLANT Left 9/15/2020    Procedure: ARTHROPLASTY, SHOULDER, TOTAL;  Surgeon: Juventino Greene M.D.;  Location: SURGERY [facility];  Service: Orthopedics   • BICEPS TENODESIS Left 9/15/2020    Procedure: TENODESIS, BICEPS - AND ADDISON;  Surgeon: Juventino Greene M.D.;  Location: SURGERY [facility];  Service: Orthopedics   • HARDWARE REMOVAL NEURO N/A 6/23/2016    Procedure: HARDWARE REMOVAL NEURO L3-S1;  Surgeon: Aaron Martines M.D.;  Location: [facility];  Service:    • CERVICAL DISK AND FUSION ANTERIOR  4/28/2016    Procedure: CERVICAL DISK AND FUSION ANTERIOR Cervical4-5 WITH PEEK INTERBODY;  Surgeon: Aaron Martines M.D.;  Location: SURGERY [facility];  Service:    • BASAL CELL EXCISION   "2/19/2015    Performed by Adán Blake Jr., M.D. at SURGERY [facility]   • FLAP CLOSURE  2/19/2015    Performed by Adán Blake Jr., M.D. at SURGERY [facility]   • CATARACT EXTRACTION WITH IOL Bilateral 2015   • OTHER ABDOMINAL SURGERY  2007    gastric bypass   • OTHER  2007    neck surgery   • OTHER NEUROLOGICAL SURG  2007,    6 back surgeries (at ages 13, 18, 20, 37, and 52)   • OTHER CARDIAC SURGERY  2006    bypass x 4 vessels   • ARLEN HOLES       SOCHX:  reports that she quit smoking about 16 years ago. Her smoking use included cigarettes. She has a 20.00 pack-year smoking history. She has never used smokeless tobacco. She reports that she does not drink alcohol and does not use drugs.  FH: Family history was reviewed, no pertinent findings to report    Review of Systems   Constitutional: Negative for chills, fever and malaise/fatigue.   Respiratory: Negative for shortness of breath.    Cardiovascular: Negative for chest pain, palpitations and orthopnea.   Gastrointestinal: Negative for abdominal pain, constipation, diarrhea, nausea and vomiting.   Genitourinary: Negative for dysuria, flank pain, frequency, hematuria and urgency.   Musculoskeletal: Positive for back pain, falls and myalgias. Negative for joint pain.   Skin: Negative for itching and rash.   Neurological: Negative for dizziness, tingling, sensory change, focal weakness, weakness and headaches.   All other systems reviewed and are negative.             Objective     /86 (BP Location: Right arm, Patient Position: Sitting, BP Cuff Size: Adult)   Pulse 79   Temp 35.8 °C (96.5 °F) (Temporal)   Resp 20   Ht 1.549 m (5' 1\")   Wt 56.5 kg (124 lb 9.6 oz)   SpO2 98%   BMI 23.54 kg/m²      Physical Exam  Vitals reviewed.   Constitutional:       General: She is not in acute distress.     Appearance: She is well-developed. She is not ill-appearing, toxic-appearing or diaphoretic.   HENT:      Head: Normocephalic.   Eyes:      " Pupils: Pupils are equal, round, and reactive to light.   Cardiovascular:      Rate and Rhythm: Normal rate.   Pulmonary:      Effort: Pulmonary effort is normal.   Skin:     General: Skin is warm and dry.      Findings: No erythema or rash.   Neurological:      Mental Status: She is alert and oriented to person, place, and time.   Psychiatric:         Behavior: Behavior is cooperative.                             Assessment & Plan        1. Fall, initial encounter      2. Left hip pain    - methylPREDNISolone (MEDROL DOSEPAK) 4 MG Tablet Therapy Pack; Follow schedule on package instructions.  Dispense: 21 Tablet; Refill: 0  - ketorolac (TORADOL) injection 30 mg    3. Traumatic ecchymosis of buttock, initial encounter      4. Acute left-sided low back pain without sciatica    - POCT Urinalysis     -Take over the counter NSAID as needed for back discomfort, avoid any Ibuprofen products with oral steroid use, may use Tylenol as needed for additional pain relief  -May use cool compresses for any swelling /throbbing pain and warm compresses for muscle stiffness (recommend heat application)  -May perform gentle back muscle stretches as tolerated after warm compresses to maintain mobility, avoid abdominal crunches, heavy lifting or repetitive motions  -May use Baclofen as directed when at home only due to drowsiness  -May apply topical analgesics as needed (preferred lidocaine based topical like Salon Pas)  -Perform proper body mechanics with lifting, twisting, bending and walking. Ask for assistance with heavy objects as needed   -Monitor for bowel/urination problems, numbness/tingling in extremities, decreased range of rosio with ambulation difficulty- need re-evaluation    -Education provided: see above    -Return to urgent care as needed if new or worsening symptoms  -Patient expresses understanding to treatment and plan of care  -Questions were encouraged and answered  -Recommended over the counter meds were written  down when requested   -Advised to follow-up with the primary care provider for recheck, reevaluation, and consideration of further management as needed     -Time spent evaluating this patient was at least 30 minutes. This time comprises of the following: preparing for visit/chart review, patient history taken into account pertinent to symptoms, patient counseling/education for needed treatment outpatient, exam/evaluation/treatment plan provided, ordering any appropriate lab/test/procedures/meds, independent interpretation of any clinic testing, medication management with any other listed medications and chart documentation.

## 2022-05-03 ENCOUNTER — OFFICE VISIT (OUTPATIENT)
Dept: URGENT CARE | Facility: CLINIC | Age: 67
End: 2022-05-03
Payer: MEDICARE

## 2022-05-03 VITALS
TEMPERATURE: 97.2 F | SYSTOLIC BLOOD PRESSURE: 128 MMHG | HEART RATE: 75 BPM | OXYGEN SATURATION: 94 % | DIASTOLIC BLOOD PRESSURE: 72 MMHG | HEIGHT: 61 IN | WEIGHT: 121 LBS | BODY MASS INDEX: 22.84 KG/M2 | RESPIRATION RATE: 14 BRPM

## 2022-05-03 DIAGNOSIS — S30.0XXD TRAUMATIC ECCHYMOSIS OF BUTTOCK, SUBSEQUENT ENCOUNTER: ICD-10-CM

## 2022-05-03 DIAGNOSIS — M54.50 ACUTE LEFT-SIDED LOW BACK PAIN WITHOUT SCIATICA: ICD-10-CM
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
## 2022-05-03 PROCEDURE — 99213 OFFICE O/P EST LOW 20 MIN: CPT | Performed by: PHYSICIAN ASSISTANT

## 2022-05-03 RX ORDER — KETOROLAC TROMETHAMINE 30 MG/ML
30 INJECTION, SOLUTION INTRAMUSCULAR; INTRAVENOUS ONCE
Status: COMPLETED | OUTPATIENT
Start: 2022-05-03 | End: 2022-05-03

## 2022-05-03 RX ADMIN — KETOROLAC TROMETHAMINE 30 MG: 30 INJECTION, SOLUTION INTRAMUSCULAR; INTRAVENOUS at 09:33

## 2022-05-03 ASSESSMENT — ENCOUNTER SYMPTOMS
WEAKNESS: 0
BACK PAIN: 1
PERIANAL NUMBNESS: 0
FEVER: 0
TINGLING: 0
NUMBNESS: 0
BOWEL INCONTINENCE: 0
PARESTHESIAS: 0

## 2022-05-03 ASSESSMENT — FIBROSIS 4 INDEX: FIB4 SCORE: 0.8

## 2022-05-03 NOTE — PROGRESS NOTES
Subjective     Vaishali Emery is a 66 y.o. female who presents with Fall (X 1 week and injured L low back down her L leg.  Pt. Is asking for a Toradol shot for pain. )          This is a recurrent problem.  The patient presents to clinic complaining back pain x1 week following a ground-level fall.  The patient is requesting a shot of Toradol at this time.  The patient states she was seen at the [facility] ED following the fall and had a CT at that time, which he states is negative.  The patient states she was subsequently seen in urgent care x1 week ago.  The patient states she received a Toradol shot at that time, which improved her symptoms temporarily.  The patient states she was also prescribed a short course of oral steroids, which she has since completed.  The patient is requesting a second Toradol shot today in clinic.  The patient states she continues to experience the pain to her low back with radiation of pain to the left lower extremity.  The patient states the bruising to her left buttocks has also migrated down her left lower extremity.  The patient states she is also experiencing some swelling to her left buttocks.  The patient reports no neurological symptoms.  No numbness, tingling, weakness of the extremities.  No incontinence of bladder/bowel.  No perianal numbness.  The patient states she is on chronic pain meds, which she continues to take as prescribed.  The patient states she is not scheduled to follow-up with her pain management doctor for a couple of weeks. The patient reports no prior history of kidney problems.     Back Pain  This is a recurrent problem. Episode onset: x 1 week ago. The problem occurs constantly. The problem is unchanged. The pain is present in the lumbar spine. The quality of the pain is described as aching. The pain radiates to the left thigh. The symptoms are aggravated by standing and position. Pertinent negatives include no bladder incontinence, bowel  incontinence, fever, numbness, paresthesias, perianal numbness, tingling or weakness. She has tried NSAIDs for the symptoms.     PMH:  has a past medical history of Anesthesia, Angina, Angina pectoris (6/19/2009), Arthritis, Backpain (1998), Bowel habit changes, Breath shortness, CAD (coronary artery disease), Cancer ([facility]), Cervical radiculopathy (4/1/2009), Chronic pain (6/29/2011), COPD (chronic obstructive pulmonary disease) ([facility]) (4/1/2009), Dental disorder, Depression (4/1/2009), Dyslipidemia (6/29/2011), Headache(784.0) (4/1/2009), Heart burn, High cholesterol, History of anemia, History of cardiac catheterization (4/1/2009), History of coronary artery bypass graft (4/1/2009), History of gastrointestinal bleeding (4/1/2009), History of hypertension (6/19/2009), History of mixed hyperlipidemia (6/19/2009), Lumbar radiculopathy (4/1/2009), Pneumonia (1990), PONV (postoperative nausea and vomiting), Sepsis ([facility]) (2006 or 2007), Sleep apnea, Snoring, and Unspecified cataract.  MEDS:   Current Outpatient Medications:   •  [employer]S ASPIRIN ADULT LOW STRENGTH 81 MG EC tablet, TAKE 1 TABLET BY MOUTH EVERY DAY, Disp: 90 tablet, Rfl: 4  •  DULoxetine (CYMBALTA) 60 MG Cap DR Particles delayed-release capsule, Take 1 Cap by mouth 2 Times a Day., Disp: , Rfl:   •  oxyCODONE (OXY-IR) 30 MG immediate release tablet, Take 30-60 mg by mouth every four hours as needed., Disp: , Rfl:   •  fentaNYL (DURAGESIC) 50 MCG/HR PATCH 72 HR, , Disp: , Rfl:   •  fluticasone (FLONASE) 50 MCG/ACT nasal spray, Spray 1 Spray in nose every day., Disp: 16 g, Rfl: 0  •  promethazine (PHENERGAN) 25 MG Tab, Take 25 mg by mouth every 6 hours as needed for Nausea/Vomiting., Disp: , Rfl:   •  calcium carbonate (TUMS) 500 MG Chew Tab, Take 500 mg by mouth as needed (For hertburn)., Disp: , Rfl:   •  gabapentin (NEURONTIN) 800 MG tablet, Take 800 mg by mouth 3 times a day., Disp: , Rfl:   •  methylPREDNISolone (MEDROL DOSEPAK) 4 MG Tablet Therapy Pack,  Follow schedule on package instructions. (Patient not taking: Reported on 5/3/2022), Disp: 21 Tablet, Rfl: 0  •  lidocaine (XYLOCAINE) 2 % Solution, Take 15 mL by mouth every four hours as needed for Throat/Mouth Pain. Gargle and spit every 4 hours as needed (Patient not taking: Reported on 5/3/2022), Disp: 120 mL, Rfl: 0  •  atorvastatin (LIPITOR) 10 MG Tab, TAKE 1 TABLET BY MOUTH EVERY DAY (Patient not taking: No sig reported), Disp: 90 tablet, Rfl: 3  ALLERGIES:   Allergies   Allergen Reactions   • Povidone Iodine    • Povidone Iodine Rash     rash     SURGHX:   Past Surgical History:   Procedure Laterality Date   • PB RECONSTR TOTAL SHOULDER IMPLANT Left 9/15/2020    Procedure: ARTHROPLASTY, SHOULDER, TOTAL;  Surgeon: Juventino Greene M.D.;  Location: [facility];  Service: Orthopedics   • BICEPS TENODESIS Left 9/15/2020    Procedure: TENODESIS, BICEPS - AND ADDISON;  Surgeon: Juventino Greene M.D.;  Location: [facility];  Service: Orthopedics   • HARDWARE REMOVAL NEURO N/A 6/23/2016    Procedure: HARDWARE REMOVAL NEURO L3-S1;  Surgeon: Aaron Martines M.D.;  Location: [facility];  Service:    • CERVICAL DISK AND FUSION ANTERIOR  4/28/2016    Procedure: CERVICAL DISK AND FUSION ANTERIOR Cervical4-5 WITH PEEK INTERBODY;  Surgeon: Aaron Martines M.D.;  Location: [facility];  Service:    • BASAL CELL EXCISION  2/19/2015    Performed by Adán Blake Jr., M.D. at SURGERY [facility]   • FLAP CLOSURE  2/19/2015    Performed by Adán Blake Jr., M.D. at [facility]   • CATARACT EXTRACTION WITH IOL Bilateral 2015   • OTHER ABDOMINAL SURGERY  2007    gastric bypass   • OTHER  2007    neck surgery   • OTHER NEUROLOGICAL SURG  2007,    6 back surgeries (at ages 13, 18, 20, 37, and 52)   • OTHER CARDIAC SURGERY  2006    bypass x 4 vessels   • ARLEN HOLES       SOCHX:  reports that she quit smoking about 16 years ago. Her smoking use included cigarettes. She has a  "20.00 pack-year smoking history. She has never used smokeless tobacco. She reports that she does not drink alcohol and does not use drugs.  FH: Family history was reviewed, no pertinent findings to report    Review of Systems   Constitutional: Negative for fever.   Gastrointestinal: Negative for bowel incontinence.   Genitourinary: Negative for bladder incontinence.   Musculoskeletal: Positive for back pain.   Neurological: Negative for tingling, weakness, numbness and paresthesias.              Objective     /72   Pulse 75   Temp 36.2 °C (97.2 °F) (Temporal)   Resp 14   Ht 1.549 m (5' 1\")   Wt 54.9 kg (121 lb)   SpO2 94%   BMI 22.86 kg/m²      Physical Exam  Constitutional:       General: She is not in acute distress.     Appearance: Normal appearance. She is well-developed. She is not ill-appearing.   HENT:      Head: Normocephalic and atraumatic.      Right Ear: External ear normal.      Left Ear: External ear normal.      Nose: Nose normal.   Eyes:      Extraocular Movements: Extraocular movements intact.      Conjunctiva/sclera: Conjunctivae normal.   Cardiovascular:      Rate and Rhythm: Normal rate.   Pulmonary:      Effort: Pulmonary effort is normal.   Musculoskeletal:      Cervical back: Normal range of motion and neck supple.      Comments:   Lumbar Spine:  Diffuse tenderness to the lumbar region including midline tenderness and bilateral paraspinal tenderness, left greater than right.  A midline old surgical scar is present.  The patient has tenderness to the left paraspinal region extending to the left buttocks with localized edema to the inferior aspect of the left buttocks.  Ecchymosis is present to the left buttocks in various stages of healing and extends down the lateral aspect of the left lower extremity.  No swelling of the left lower extremity.  No overlying erythema.  No increased warmth.  No open wounds/abrasions.  Decreased ROM -patient demonstrates limited range of motion " secondary to pain  Neurovascular intact distally  Strength 5/5  Normal gait   Skin:     General: Skin is warm and dry.   Neurological:      Mental Status: She is alert and oriented to person, place, and time.                  Progress:  Toradol 30mg IM given in clinic.       Reviewed the patient's previous clinic visit note on 4/20/2022.         Assessment & Plan          1. Fall, subsequent encounter    2. Acute left-sided low back pain without sciatica  - ketorolac (TORADOL) injection 30 mg    3. Traumatic ecchymosis of buttock, subsequent encounter    The patient's presenting symptoms and physical examinations are consistent with acute left-sided low back pain with associated traumatic ecchymosis of the left back buttocks secondary to a fall.  The patient is requesting a Toradol injection at this time.  The patient reports no prior history of renal dysfunction.  Based on the patient's physical exam findings I am concerned the patient may have a traumatic hematoma of the left buttocks region.  Advised patient to monitor worsening signs and or symptoms.  Recommend OTC medications and supportive care for symptomatic management.  Recommend patient follow-up with pain management and/or primary care.  Discussed return precautions with the patient, and she verbalized understanding.    Differential diagnoses, supportive care, and indications for immediate follow-up discussed with patient.   Instructed to return to clinic or nearest emergency department for any change in condition, further concerns, or worsening of symptoms.    OTC NSAIDs for pain/discomfort  Alternate ice and heat to the affected area for symptomatic relief  Home stretches as discussed in clinic  Follow-up with PCP  Return to clinic or go to the ED if symptoms worsen or fail to improve, or if the patient should develop worsening/increasing back pain, radiation of pain, numbness, tingling, or weakness to the lower extremities, incontinence of  bladder/bowel, paresthesias, difficulty walking, fever/chills, and/or any concerning symptoms.     Discussed plan with the patient, and she agrees to the above.     I personally reviewed prior external notes and test results pertinent to today's visit.  I have independently reviewed and interpreted all diagnostics ordered during this urgent care visit.       Please note that this dictation was created using voice recognition software. I have made every reasonable attempt to correct obvious errors, but I expect that there may be errors of grammar and possibly content that I did not discover before finalizing the note.     This note was electronically signed by Allyssa Fine PA-C

## 2022-05-12 ENCOUNTER — OFFICE VISIT (OUTPATIENT)
Dept: URGENT CARE | Facility: CLINIC | Age: 67
End: 2022-05-12
Payer: MEDICARE

## 2022-05-12 VITALS
DIASTOLIC BLOOD PRESSURE: 80 MMHG | RESPIRATION RATE: 24 BRPM | TEMPERATURE: 97.9 F | HEART RATE: 68 BPM | BODY MASS INDEX: 21.71 KG/M2 | OXYGEN SATURATION: 98 % | WEIGHT: 115 LBS | SYSTOLIC BLOOD PRESSURE: 128 MMHG | HEIGHT: 61 IN

## 2022-05-12 DIAGNOSIS — J02.9 PHARYNGITIS, UNSPECIFIED ETIOLOGY: ICD-10-CM

## 2022-05-12 DIAGNOSIS — M54.16 LUMBAR RADICULOPATHY: ICD-10-CM
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
## 2022-05-12 DIAGNOSIS — Z20.818 EXPOSURE TO STREPTOCOCCAL PHARYNGITIS: ICD-10-CM

## 2022-05-12 LAB
INT CON NEG: NORMAL
INT CON POS: NORMAL
S PYO AG THROAT QL: NEGATIVE

## 2022-05-12 PROCEDURE — 99214 OFFICE O/P EST MOD 30 MIN: CPT | Performed by: PHYSICIAN ASSISTANT

## 2022-05-12 PROCEDURE — 87880 STREP A ASSAY W/OPTIC: CPT | Performed by: PHYSICIAN ASSISTANT

## 2022-05-12 RX ORDER — KETOROLAC TROMETHAMINE 30 MG/ML
30 INJECTION, SOLUTION INTRAMUSCULAR; INTRAVENOUS ONCE
Status: COMPLETED | OUTPATIENT
Start: 2022-05-12 | End: 2022-05-12

## 2022-05-12 RX ADMIN — KETOROLAC TROMETHAMINE 30 MG: 30 INJECTION, SOLUTION INTRAMUSCULAR; INTRAVENOUS at 17:52

## 2022-05-12 ASSESSMENT — ENCOUNTER SYMPTOMS
MYALGIAS: 1
FALLS: 1
HEADACHES: 0
DIZZINESS: 0
TINGLING: 0
BACK PAIN: 1
SINUS PAIN: 0
COUGH: 0
CHILLS: 0
FEVER: 0
FLANK PAIN: 0
SORE THROAT: 1
WEAKNESS: 0

## 2022-05-12 ASSESSMENT — FIBROSIS 4 INDEX: FIB4 SCORE: 0.8

## 2022-05-13 NOTE — PROGRESS NOTES
Subjective:     CHIEF COMPLAINT  Chief Complaint   Patient presents with   • Pharyngitis       HPI  Vaishali Emery is a very pleasant 66 y.o. female who presents to the clinic with recurrent back pain and a sore throat.    Back pain:  This is a recurrent problem.  The patient fell approximately 1 month ago.  She was seen in [facility] emergency department and had a CT scan preformed.  CT scan was negative.  She was then subsequently seen twice in the urgent care.  On both occasions received Toradol injections which temporarily improve her symptoms.  She also completed a Medrol Dosepak.  She is seeing a chronic pain specialist for which she takes 6 30 mg oxycodones daily as well as a fentanyl patch.  Requesting a Toradol injection today.  She continues to have left-sided low back pain with occasional radicular symptoms down the left leg.  No numbness or tingling of the lower extremities.  No change in bowel or bladder function.  No saddle anesthesia.  Patient denies any history of renal dysfunction.  No recent lab work however most previous lab work from 2020 revealed normal GFR.    Pharyngitis:  Patient has also had a sore throat that has been intermittent for the last week.  Her son recently tested positive for strep pharyngitis.  Has been taking antibiotics.  She would like to rule this out at this time.  No difficulty swallowing or handling secretions.  No pain with neck movements.  No recent fevers, chills or myalgias.    REVIEW OF SYSTEMS  Review of Systems   Constitutional: Negative for chills, fever and malaise/fatigue.   HENT: Positive for sore throat. Negative for congestion, ear pain and sinus pain.    Respiratory: Negative for cough.    Genitourinary: Negative for flank pain.        No saddle anesthesia or change in bowel or bladder function.   Musculoskeletal: Positive for back pain, falls and myalgias.   Skin: Negative for rash.   Neurological: Negative for dizziness, tingling, weakness  and headaches.       PAST MEDICAL HISTORY  Patient Active Problem List    Diagnosis Date Noted   • Complication internal fixation device such as nail, plate, or case ([facility]) 06/23/2016   • Cervical spinal stenosis 04/28/2016   • ACS (acute coronary syndrome) ([facility]) 03/12/2016   • Basal cell carcinoma of skin of other parts of face 02/19/2015   • Hypoxia 02/19/2015   • HTN (hypertension) 10/09/2012   • Right carotid bruit 10/09/2012   • Epigastric pain 08/11/2011   • Leukopenia 08/11/2011   • Chronic pain 06/29/2011   • CAD (coronary artery disease) 06/29/2011   • Dyslipidemia 06/29/2011   • LION (obstructive sleep apnea) 06/29/2011   • History of hypertension 06/19/2009   • History of mixed hyperlipidemia 06/19/2009   • Cervical radiculopathy 04/01/2009   • COPD (chronic obstructive pulmonary disease) ([facility]) 04/01/2009   • Depression 04/01/2009   • Headache 04/01/2009   • Lumbar radiculopathy 04/01/2009   • URINARY INCONTINENCE 04/01/2009   • History of cardiac catheterization 04/01/2009   • History of coronary artery bypass graft 04/01/2009   • History of gastrointestinal bleeding 04/01/2009       SURGICAL HISTORY   has a past surgical history that includes other abdominal surgery (2007); other (2007); other cardiac surgery (2006); basal cell excision (2/19/2015); flap closure (2/19/2015); ritika holes; other neurological surg (2007,); hardware removal neuro (N/A, 6/23/2016); cataract extraction with iol (Bilateral, 2015); cervical disk and fusion anterior (4/28/2016); reconstr total shoulder implant (Left, 9/15/2020); and biceps tenodesis (Left, 9/15/2020).    ALLERGIES  Allergies   Allergen Reactions   • Povidone Iodine    • Povidone Iodine Rash     rash       CURRENT MEDICATIONS  Home Medications     Reviewed by Juliano Deras P.A.-C. (Physician Assistant) on 05/12/22 at 1710  Med List Status: <None>   Medication Last Dose Status   atorvastatin (LIPITOR) 10 MG Tab Not Taking Active   calcium carbonate (TUMS) 500 MG Chew  "Tab PRN Active   [facility] ASPIRIN ADULT LOW STRENGTH 81 MG EC tablet Taking Active   DULoxetine (CYMBALTA) 60 MG Cap DR Particles delayed-release capsule Taking Active   fentaNYL (DURAGESIC) 50 MCG/HR PATCH 72 HR Taking Active   fluticasone (FLONASE) 50 MCG/ACT nasal spray PRN Active   gabapentin (NEURONTIN) 800 MG tablet Taking Active   lidocaine (XYLOCAINE) 2 % Solution Not Taking Active   methylPREDNISolone (MEDROL DOSEPAK) 4 MG Tablet Therapy Pack Not Taking Active   oxyCODONE (OXY-IR) 30 MG immediate release tablet Taking Active   promethazine (PHENERGAN) 25 MG Tab PRN Active                SOCIAL HISTORY  Social History     Tobacco Use   • Smoking status: Former Smoker     Packs/day: 0.50     Years: 40.00     Pack years: 20.00     Types: Cigarettes     Quit date: 2006     Years since quittin.2   • Smokeless tobacco: Never Used   Vaping Use   • Vaping Use: Every day   • Substances: Nicotine   • Devices: Pre-filled or refillable cartridge   Substance and Sexual Activity   • Alcohol use: No   • Drug use: No   • Sexual activity: Not on file       FAMILY HISTORY  Family History   Problem Relation Age of Onset   • Cancer Father         brain          Objective:     VITAL SIGNS: /80 (BP Location: Left arm, Patient Position: Sitting, BP Cuff Size: Adult)   Pulse 68   Temp 36.6 °C (97.9 °F) (Temporal)   Resp (!) 24   Ht 1.549 m (5' 1\")   Wt 52.2 kg (115 lb)   SpO2 98%   BMI 21.73 kg/m²     PHYSICAL EXAM  Physical Exam  Constitutional:       General: She is not in acute distress.     Appearance: Normal appearance. She is not ill-appearing, toxic-appearing or diaphoretic.   HENT:      Head: Normocephalic and atraumatic.      Nose: Nose normal. No congestion or rhinorrhea.      Mouth/Throat:      Mouth: Mucous membranes are moist.      Pharynx: No oropharyngeal exudate or posterior oropharyngeal erythema.   Cardiovascular:      Rate and Rhythm: Normal rate and regular rhythm.      Pulses: Normal pulses. "      Heart sounds: Normal heart sounds.   Pulmonary:      Effort: Pulmonary effort is normal.   Musculoskeletal:      Cervical back: Normal range of motion.      Comments: Lumbar exam: No midline tenderness to palpation.  Left-sided paraspinal and gluteal muscle tenderness to palpation.  Lower extremity strength and sensation is full and intact.  Normal gait.   Skin:     Findings: Bruising (Left gluteal ecchymosis) present.   Neurological:      General: No focal deficit present.      Mental Status: She is alert and oriented to person, place, and time. Mental status is at baseline.       POCT strep: Negative      Assessment/Plan:     1. Fall, subsequent encounter  - ketorolac (TORADOL) injection 30 mg    2. Lumbar radiculopathy  - ketorolac (TORADOL) injection 30 mg    3. Pharyngitis, unspecified etiology  - POCT Rapid Strep A    4. Exposure to Streptococcal pharyngitis  - POCT Rapid Strep A      MDM/Comments:    Patient's rapid strep in clinic was negative.  Discussed likely viral pharyngitis.  Alternate Tylenol and ibuprofen as needed for symptoms.  Increase fluid intake.  Follow-up in clinic for any red flag symptoms in which we discussed at length.    Patient continues to have back pain.  Requesting Toradol injection.  No history of renal dysfunction.  No recent lab work to review.  30 mg Toradol provided in clinic today.  Advised the patient to follow-up with PCP so that annual lab work can be performed and we can confirm her current GFR.  She has follow-up with her pain specialist next week.  ER precautions were any red flag symptoms.    Differential diagnosis, natural history, supportive care, and indications for immediate follow-up discussed. All questions answered. Patient agrees with the plan of care.    Follow-up as needed if symptoms worsen or fail to improve to PCP, Urgent care or Emergency Room.    I have personally reviewed prior external notes and test results pertinent to today's visit.  I have  independently reviewed and interpreted all diagnostics ordered during this urgent care acute visit.   Discussed management options (risks,benefits, and alternatives to treatment). Pt expresses understanding and the treatment plan was agreed upon. Questions were encouraged and answered to pt's satisfaction.    Please note that this dictation was created using voice recognition software. I have made a reasonable attempt to correct obvious errors, but I expect that there are errors of grammar and possibly content that I did not discover before finalizing the note.

## 2022-10-03 DIAGNOSIS — I25.118 CORONARY ARTERY DISEASE WITH STABLE ANGINA PECTORIS, UNSPECIFIED VESSEL OR LESION TYPE, UNSPECIFIED WHETHER NATIVE OR TRANSPLANTED HEART (HCC): ICD-10-CM

## 2022-10-04 RX ORDER — ASPIRIN 81 MG/1
TABLET ORAL
Qty: 90 TABLET | Refills: 0 | OUTPATIENT
Start: 2022-10-04

## 2022-10-04 NOTE — TELEPHONE ENCOUNTER
Is the patient due for a refill? Yes    Was the patient seen the past year? No    Date of last office visit: 7/22/20    Does the patient have an upcoming appointment?  No   If yes, When?     Provider to refill:AK    Does the patients insurance require a 100 day supply?  No

## 2023-08-10 ENCOUNTER — HOSPITAL ENCOUNTER (INPATIENT)
Facility: MEDICAL CENTER | Age: 68
LOS: 3 days | DRG: 194 | End: 2023-08-13
Attending: EMERGENCY MEDICINE | Admitting: INTERNAL MEDICINE
Payer: MEDICARE

## 2023-08-10 ENCOUNTER — APPOINTMENT (OUTPATIENT)
Dept: RADIOLOGY | Facility: MEDICAL CENTER | Age: 68
DRG: 194 | End: 2023-08-10
Attending: EMERGENCY MEDICINE
Payer: MEDICARE

## 2023-08-10 ENCOUNTER — APPOINTMENT (OUTPATIENT)
Dept: RADIOLOGY | Facility: MEDICAL CENTER | Age: 68
DRG: 194 | End: 2023-08-10
Attending: STUDENT IN AN ORGANIZED HEALTH CARE EDUCATION/TRAINING PROGRAM
Payer: MEDICARE

## 2023-08-10 DIAGNOSIS — C91.10 CLL (CHRONIC LYMPHOCYTIC LEUKEMIA) (HCC): ICD-10-CM

## 2023-08-10 DIAGNOSIS — J18.9 PNEUMONIA OF RIGHT LOWER LOBE DUE TO INFECTIOUS ORGANISM: ICD-10-CM

## 2023-08-10 DIAGNOSIS — J18.9 PNEUMONIA DUE TO INFECTIOUS ORGANISM, UNSPECIFIED LATERALITY, UNSPECIFIED PART OF LUNG: ICD-10-CM

## 2023-08-10 DIAGNOSIS — R91.8 PULMONARY MASS: ICD-10-CM

## 2023-08-10 DIAGNOSIS — G89.29 CHRONIC RIGHT-SIDED LOW BACK PAIN WITHOUT SCIATICA: ICD-10-CM

## 2023-08-10 DIAGNOSIS — E78.5 DYSLIPIDEMIA: ICD-10-CM

## 2023-08-10 DIAGNOSIS — I25.10 CORONARY ARTERY DISEASE INVOLVING NATIVE HEART WITHOUT ANGINA PECTORIS, UNSPECIFIED VESSEL OR LESION TYPE: ICD-10-CM

## 2023-08-10 DIAGNOSIS — M54.50 CHRONIC RIGHT-SIDED LOW BACK PAIN WITHOUT SCIATICA: ICD-10-CM

## 2023-08-10 PROBLEM — C95.90 LEUKEMIA (HCC): Status: ACTIVE | Noted: 2023-08-10

## 2023-08-10 PROBLEM — R53.1 GENERALIZED WEAKNESS: Status: ACTIVE | Noted: 2023-08-10

## 2023-08-10 PROBLEM — D72.829 LEUKOCYTOSIS: Status: ACTIVE | Noted: 2023-08-10

## 2023-08-10 LAB
ALBUMIN SERPL BCP-MCNC: 4.5 G/DL (ref 3.2–4.9)
ALBUMIN/GLOB SERPL: 1.7 G/DL
ALP SERPL-CCNC: 96 U/L (ref 30–99)
ALT SERPL-CCNC: 14 U/L (ref 2–50)
ANION GAP SERPL CALC-SCNC: 11 MMOL/L (ref 7–16)
AST SERPL-CCNC: 21 U/L (ref 12–45)
BASOPHILS # BLD AUTO: 0 % (ref 0–1.8)
BASOPHILS # BLD: 0 K/UL (ref 0–0.12)
BILIRUB SERPL-MCNC: 0.2 MG/DL (ref 0.1–1.5)
BUN SERPL-MCNC: 11 MG/DL (ref 8–22)
CALCIUM ALBUM COR SERPL-MCNC: 8.8 MG/DL (ref 8.5–10.5)
CALCIUM SERPL-MCNC: 9.2 MG/DL (ref 8.4–10.2)
CHLORIDE SERPL-SCNC: 105 MMOL/L (ref 96–112)
CO2 SERPL-SCNC: 24 MMOL/L (ref 20–33)
CREAT SERPL-MCNC: 0.7 MG/DL (ref 0.5–1.4)
EKG IMPRESSION: NORMAL
EOSINOPHIL # BLD AUTO: 0 K/UL (ref 0–0.51)
EOSINOPHIL NFR BLD: 0 % (ref 0–6.9)
ERYTHROCYTE [DISTWIDTH] IN BLOOD BY AUTOMATED COUNT: 57 FL (ref 35.9–50)
GFR SERPLBLD CREATININE-BSD FMLA CKD-EPI: 93 ML/MIN/1.73 M 2
GLOBULIN SER CALC-MCNC: 2.6 G/DL (ref 1.9–3.5)
GLUCOSE SERPL-MCNC: 106 MG/DL (ref 65–99)
HCT VFR BLD AUTO: 35.2 % (ref 37–47)
HGB BLD-MCNC: 11 G/DL (ref 12–16)
LYMPHOCYTES # BLD AUTO: 22.36 K/UL (ref 1–4.8)
LYMPHOCYTES NFR BLD: 79 % (ref 22–41)
MANUAL DIFF BLD: NORMAL
MCH RBC QN AUTO: 31.4 PG (ref 27–33)
MCHC RBC AUTO-ENTMCNC: 31.3 G/DL (ref 32.2–35.5)
MCV RBC AUTO: 100.6 FL (ref 81.4–97.8)
MONOCYTES # BLD AUTO: 1.13 K/UL (ref 0–0.85)
MONOCYTES NFR BLD AUTO: 4 % (ref 0–13.4)
NEUTROPHILS # BLD AUTO: 4.81 K/UL (ref 1.82–7.42)
NEUTROPHILS NFR BLD: 15 % (ref 44–72)
NEUTS BAND NFR BLD MANUAL: 2 % (ref 0–10)
NRBC # BLD AUTO: 0.04 K/UL
NRBC BLD-RTO: 0.1 /100 WBC (ref 0–0.2)
PLATELET # BLD AUTO: 367 K/UL (ref 164–446)
PLATELET BLD QL SMEAR: NORMAL
PMV BLD AUTO: 10.1 FL (ref 9–12.9)
POTASSIUM SERPL-SCNC: 5.1 MMOL/L (ref 3.6–5.5)
PROCALCITONIN SERPL-MCNC: 0.05 NG/ML
PROT SERPL-MCNC: 7.1 G/DL (ref 6–8.2)
RBC # BLD AUTO: 3.5 M/UL (ref 4.2–5.4)
RBC BLD AUTO: NORMAL
SODIUM SERPL-SCNC: 140 MMOL/L (ref 135–145)
TROPONIN T SERPL-MCNC: 22 NG/L (ref 6–19)
VARIANT LYMPHS BLD QL SMEAR: NORMAL
WBC # BLD AUTO: 28.3 K/UL (ref 4.8–10.8)

## 2023-08-10 PROCEDURE — A9270 NON-COVERED ITEM OR SERVICE: HCPCS | Performed by: EMERGENCY MEDICINE

## 2023-08-10 PROCEDURE — 71045 X-RAY EXAM CHEST 1 VIEW: CPT

## 2023-08-10 PROCEDURE — 94760 N-INVAS EAR/PLS OXIMETRY 1: CPT

## 2023-08-10 PROCEDURE — 700105 HCHG RX REV CODE 258: Performed by: INTERNAL MEDICINE

## 2023-08-10 PROCEDURE — 700102 HCHG RX REV CODE 250 W/ 637 OVERRIDE(OP): Performed by: EMERGENCY MEDICINE

## 2023-08-10 PROCEDURE — A9270 NON-COVERED ITEM OR SERVICE: HCPCS | Performed by: INTERNAL MEDICINE

## 2023-08-10 PROCEDURE — 93005 ELECTROCARDIOGRAM TRACING: CPT | Performed by: EMERGENCY MEDICINE

## 2023-08-10 PROCEDURE — 99223 1ST HOSP IP/OBS HIGH 75: CPT | Mod: AI | Performed by: INTERNAL MEDICINE

## 2023-08-10 PROCEDURE — 99285 EMERGENCY DEPT VISIT HI MDM: CPT

## 2023-08-10 PROCEDURE — 36415 COLL VENOUS BLD VENIPUNCTURE: CPT

## 2023-08-10 PROCEDURE — 96367 TX/PROPH/DG ADDL SEQ IV INF: CPT

## 2023-08-10 PROCEDURE — 770001 HCHG ROOM/CARE - MED/SURG/GYN PRIV*

## 2023-08-10 PROCEDURE — 700105 HCHG RX REV CODE 258: Performed by: EMERGENCY MEDICINE

## 2023-08-10 PROCEDURE — 700102 HCHG RX REV CODE 250 W/ 637 OVERRIDE(OP): Performed by: INTERNAL MEDICINE

## 2023-08-10 PROCEDURE — 700111 HCHG RX REV CODE 636 W/ 250 OVERRIDE (IP): Mod: JZ | Performed by: EMERGENCY MEDICINE

## 2023-08-10 PROCEDURE — 700111 HCHG RX REV CODE 636 W/ 250 OVERRIDE (IP): Mod: JZ | Performed by: INTERNAL MEDICINE

## 2023-08-10 PROCEDURE — 700117 HCHG RX CONTRAST REV CODE 255: Performed by: EMERGENCY MEDICINE

## 2023-08-10 PROCEDURE — 80053 COMPREHEN METABOLIC PANEL: CPT

## 2023-08-10 PROCEDURE — 96365 THER/PROPH/DIAG IV INF INIT: CPT

## 2023-08-10 PROCEDURE — 71275 CT ANGIOGRAPHY CHEST: CPT

## 2023-08-10 PROCEDURE — 700101 HCHG RX REV CODE 250: Performed by: EMERGENCY MEDICINE

## 2023-08-10 PROCEDURE — 96375 TX/PRO/DX INJ NEW DRUG ADDON: CPT

## 2023-08-10 PROCEDURE — 84145 PROCALCITONIN (PCT): CPT

## 2023-08-10 PROCEDURE — 72131 CT LUMBAR SPINE W/O DYE: CPT

## 2023-08-10 PROCEDURE — 87040 BLOOD CULTURE FOR BACTERIA: CPT

## 2023-08-10 PROCEDURE — 85025 COMPLETE CBC W/AUTO DIFF WBC: CPT

## 2023-08-10 PROCEDURE — 85007 BL SMEAR W/DIFF WBC COUNT: CPT

## 2023-08-10 PROCEDURE — 84484 ASSAY OF TROPONIN QUANT: CPT

## 2023-08-10 RX ORDER — AMOXICILLIN 250 MG
2 CAPSULE ORAL 2 TIMES DAILY
Status: DISCONTINUED | OUTPATIENT
Start: 2023-08-10 | End: 2023-08-13 | Stop reason: HOSPADM

## 2023-08-10 RX ORDER — BACLOFEN 20 MG/1
20 TABLET ORAL 2 TIMES DAILY PRN
COMMUNITY

## 2023-08-10 RX ORDER — ASPIRIN 81 MG/1
81 TABLET ORAL DAILY
Status: DISCONTINUED | OUTPATIENT
Start: 2023-08-10 | End: 2023-08-13 | Stop reason: HOSPADM

## 2023-08-10 RX ORDER — OXYCODONE HYDROCHLORIDE 5 MG/1
2.5 TABLET ORAL
Status: DISCONTINUED | OUTPATIENT
Start: 2023-08-10 | End: 2023-08-11

## 2023-08-10 RX ORDER — AZITHROMYCIN 250 MG/1
500 TABLET, FILM COATED ORAL DAILY
Status: COMPLETED | OUTPATIENT
Start: 2023-08-11 | End: 2023-08-12

## 2023-08-10 RX ORDER — RAMIPRIL 2.5 MG/1
10 CAPSULE ORAL DAILY
Status: DISCONTINUED | OUTPATIENT
Start: 2023-08-10 | End: 2023-08-13 | Stop reason: HOSPADM

## 2023-08-10 RX ORDER — BISACODYL 10 MG
10 SUPPOSITORY, RECTAL RECTAL
Status: DISCONTINUED | OUTPATIENT
Start: 2023-08-10 | End: 2023-08-13 | Stop reason: HOSPADM

## 2023-08-10 RX ORDER — OXYCODONE HYDROCHLORIDE 5 MG/1
5 TABLET ORAL
Status: DISCONTINUED | OUTPATIENT
Start: 2023-08-10 | End: 2023-08-11

## 2023-08-10 RX ORDER — ACETAMINOPHEN 325 MG/1
650 TABLET ORAL EVERY 6 HOURS PRN
Status: DISCONTINUED | OUTPATIENT
Start: 2023-08-10 | End: 2023-08-13 | Stop reason: HOSPADM

## 2023-08-10 RX ORDER — HEPARIN SODIUM 5000 [USP'U]/ML
5000 INJECTION, SOLUTION INTRAVENOUS; SUBCUTANEOUS EVERY 8 HOURS
Status: DISCONTINUED | OUTPATIENT
Start: 2023-08-10 | End: 2023-08-13 | Stop reason: HOSPADM

## 2023-08-10 RX ORDER — DULOXETIN HYDROCHLORIDE 30 MG/1
60 CAPSULE, DELAYED RELEASE ORAL DAILY
Status: DISCONTINUED | OUTPATIENT
Start: 2023-08-11 | End: 2023-08-13 | Stop reason: HOSPADM

## 2023-08-10 RX ORDER — ONDANSETRON 2 MG/ML
4 INJECTION INTRAMUSCULAR; INTRAVENOUS ONCE
Status: COMPLETED | OUTPATIENT
Start: 2023-08-10 | End: 2023-08-10

## 2023-08-10 RX ORDER — ONDANSETRON 2 MG/ML
4 INJECTION INTRAMUSCULAR; INTRAVENOUS EVERY 8 HOURS PRN
Status: DISCONTINUED | OUTPATIENT
Start: 2023-08-10 | End: 2023-08-13 | Stop reason: HOSPADM

## 2023-08-10 RX ORDER — AZITHROMYCIN 250 MG/1
500 TABLET, FILM COATED ORAL ONCE
Status: COMPLETED | OUTPATIENT
Start: 2023-08-10 | End: 2023-08-10

## 2023-08-10 RX ORDER — SODIUM CHLORIDE, SODIUM LACTATE, POTASSIUM CHLORIDE, CALCIUM CHLORIDE 600; 310; 30; 20 MG/100ML; MG/100ML; MG/100ML; MG/100ML
INJECTION, SOLUTION INTRAVENOUS CONTINUOUS
Status: DISCONTINUED | OUTPATIENT
Start: 2023-08-10 | End: 2023-08-11

## 2023-08-10 RX ORDER — ATORVASTATIN CALCIUM 40 MG/1
40 TABLET, FILM COATED ORAL EVERY EVENING
Status: DISCONTINUED | OUTPATIENT
Start: 2023-08-10 | End: 2023-08-13 | Stop reason: HOSPADM

## 2023-08-10 RX ORDER — HYDROMORPHONE HYDROCHLORIDE 1 MG/ML
1 INJECTION, SOLUTION INTRAMUSCULAR; INTRAVENOUS; SUBCUTANEOUS
Status: COMPLETED | OUTPATIENT
Start: 2023-08-10 | End: 2023-08-10

## 2023-08-10 RX ORDER — RAMIPRIL 10 MG/1
10 CAPSULE ORAL DAILY
COMMUNITY

## 2023-08-10 RX ORDER — POLYETHYLENE GLYCOL 3350 17 G/17G
1 POWDER, FOR SOLUTION ORAL
Status: DISCONTINUED | OUTPATIENT
Start: 2023-08-10 | End: 2023-08-13 | Stop reason: HOSPADM

## 2023-08-10 RX ORDER — GABAPENTIN 400 MG/1
800 CAPSULE ORAL 3 TIMES DAILY
Status: DISCONTINUED | OUTPATIENT
Start: 2023-08-10 | End: 2023-08-10

## 2023-08-10 RX ORDER — GABAPENTIN 400 MG/1
800 CAPSULE ORAL 3 TIMES DAILY
Status: DISCONTINUED | OUTPATIENT
Start: 2023-08-10 | End: 2023-08-13 | Stop reason: HOSPADM

## 2023-08-10 RX ORDER — ATORVASTATIN CALCIUM 40 MG/1
40 TABLET, FILM COATED ORAL DAILY
Status: SHIPPED | COMMUNITY
End: 2023-08-10

## 2023-08-10 RX ORDER — SODIUM CHLORIDE, SODIUM LACTATE, POTASSIUM CHLORIDE, CALCIUM CHLORIDE 600; 310; 30; 20 MG/100ML; MG/100ML; MG/100ML; MG/100ML
1000 INJECTION, SOLUTION INTRAVENOUS ONCE
Status: COMPLETED | OUTPATIENT
Start: 2023-08-10 | End: 2023-08-11

## 2023-08-10 RX ORDER — GABAPENTIN 800 MG/1
800 TABLET ORAL 3 TIMES DAILY PRN
Status: DISCONTINUED | OUTPATIENT
Start: 2023-08-10 | End: 2023-08-10

## 2023-08-10 RX ORDER — HYDROMORPHONE HYDROCHLORIDE 1 MG/ML
0.25 INJECTION, SOLUTION INTRAMUSCULAR; INTRAVENOUS; SUBCUTANEOUS
Status: DISCONTINUED | OUTPATIENT
Start: 2023-08-10 | End: 2023-08-11

## 2023-08-10 RX ADMIN — ONDANSETRON 4 MG: 2 INJECTION INTRAMUSCULAR; INTRAVENOUS at 07:16

## 2023-08-10 RX ADMIN — HEPARIN SODIUM 5000 UNITS: 5000 INJECTION, SOLUTION INTRAVENOUS; SUBCUTANEOUS at 21:56

## 2023-08-10 RX ADMIN — AZITHROMYCIN DIHYDRATE 500 MG: 250 TABLET ORAL at 09:21

## 2023-08-10 RX ADMIN — GABAPENTIN 800 MG: 400 CAPSULE ORAL at 17:14

## 2023-08-10 RX ADMIN — HYDROMORPHONE HYDROCHLORIDE 1 MG: 1 INJECTION, SOLUTION INTRAMUSCULAR; INTRAVENOUS; SUBCUTANEOUS at 07:16

## 2023-08-10 RX ADMIN — IOHEXOL 64 ML: 350 INJECTION, SOLUTION INTRAVENOUS at 08:18

## 2023-08-10 RX ADMIN — GABAPENTIN 800 MG: 400 CAPSULE ORAL at 12:38

## 2023-08-10 RX ADMIN — SENNOSIDES AND DOCUSATE SODIUM 2 TABLET: 50; 8.6 TABLET ORAL at 12:36

## 2023-08-10 RX ADMIN — HYDROMORPHONE HYDROCHLORIDE 1 MG: 1 INJECTION, SOLUTION INTRAMUSCULAR; INTRAVENOUS; SUBCUTANEOUS at 12:06

## 2023-08-10 RX ADMIN — RAMIPRIL 10 MG: 2.5 CAPSULE ORAL at 12:36

## 2023-08-10 RX ADMIN — METOPROLOL TARTRATE 12.5 MG: 25 TABLET, FILM COATED ORAL at 12:37

## 2023-08-10 RX ADMIN — AMPICILLIN AND SULBACTAM 3 G: 1; 2 INJECTION, POWDER, FOR SOLUTION INTRAMUSCULAR; INTRAVENOUS at 23:21

## 2023-08-10 RX ADMIN — HEPARIN SODIUM 5000 UNITS: 5000 INJECTION, SOLUTION INTRAVENOUS; SUBCUTANEOUS at 13:32

## 2023-08-10 RX ADMIN — AMPICILLIN AND SULBACTAM 3 G: 1; 2 INJECTION, POWDER, FOR SOLUTION INTRAMUSCULAR; INTRAVENOUS at 09:21

## 2023-08-10 RX ADMIN — SODIUM CHLORIDE, POTASSIUM CHLORIDE, SODIUM LACTATE AND CALCIUM CHLORIDE 1000 ML: 600; 310; 30; 20 INJECTION, SOLUTION INTRAVENOUS at 08:48

## 2023-08-10 RX ADMIN — KETAMINE HYDROCHLORIDE 25 MG: 10 INJECTION INTRAMUSCULAR; INTRAVENOUS at 08:11

## 2023-08-10 RX ADMIN — OXYCODONE HYDROCHLORIDE 5 MG: 5 TABLET ORAL at 15:17

## 2023-08-10 RX ADMIN — ONDANSETRON 4 MG: 2 INJECTION INTRAMUSCULAR; INTRAVENOUS at 13:51

## 2023-08-10 RX ADMIN — SODIUM CHLORIDE, POTASSIUM CHLORIDE, SODIUM LACTATE AND CALCIUM CHLORIDE: 600; 310; 30; 20 INJECTION, SOLUTION INTRAVENOUS at 12:06

## 2023-08-10 RX ADMIN — AMPICILLIN AND SULBACTAM 3 G: 1; 2 INJECTION, POWDER, FOR SOLUTION INTRAMUSCULAR; INTRAVENOUS at 16:22

## 2023-08-10 RX ADMIN — HYDROMORPHONE HYDROCHLORIDE 0.25 MG: 1 INJECTION, SOLUTION INTRAMUSCULAR; INTRAVENOUS; SUBCUTANEOUS at 16:15

## 2023-08-10 RX ADMIN — ASPIRIN 81 MG: 81 TABLET, COATED ORAL at 12:37

## 2023-08-10 RX ADMIN — ATORVASTATIN CALCIUM 40 MG: 40 TABLET, FILM COATED ORAL at 17:14

## 2023-08-10 ASSESSMENT — COGNITIVE AND FUNCTIONAL STATUS - GENERAL
SUGGESTED CMS G CODE MODIFIER DAILY ACTIVITY: CH
DAILY ACTIVITIY SCORE: 24
SUGGESTED CMS G CODE MODIFIER MOBILITY: CH
MOBILITY SCORE: 24

## 2023-08-10 ASSESSMENT — PATIENT HEALTH QUESTIONNAIRE - PHQ9
1. LITTLE INTEREST OR PLEASURE IN DOING THINGS: NOT AT ALL
SUM OF ALL RESPONSES TO PHQ9 QUESTIONS 1 AND 2: 0
2. FEELING DOWN, DEPRESSED, IRRITABLE, OR HOPELESS: NOT AT ALL

## 2023-08-10 ASSESSMENT — ENCOUNTER SYMPTOMS
PALPITATIONS: 0
ABDOMINAL PAIN: 0
BLURRED VISION: 0
VOMITING: 0
COUGH: 1
CHILLS: 1
DIZZINESS: 0
HEADACHES: 0
SHORTNESS OF BREATH: 1
FEVER: 1
WEAKNESS: 1
HEARTBURN: 0
NERVOUS/ANXIOUS: 0
DOUBLE VISION: 0
BACK PAIN: 0
FALLS: 0

## 2023-08-10 ASSESSMENT — LIFESTYLE VARIABLES
TOTAL SCORE: 0
TOTAL SCORE: 0
CONSUMPTION TOTAL: NEGATIVE
EVER HAD A DRINK FIRST THING IN THE MORNING TO STEADY YOUR NERVES TO GET RID OF A HANGOVER: NO
HAVE YOU EVER FELT YOU SHOULD CUT DOWN ON YOUR DRINKING: NO
EVER FELT BAD OR GUILTY ABOUT YOUR DRINKING: NO
HAVE PEOPLE ANNOYED YOU BY CRITICIZING YOUR DRINKING: NO
TOTAL SCORE: 0
HOW MANY TIMES IN THE PAST YEAR HAVE YOU HAD 5 OR MORE DRINKS IN A DAY: 0
ALCOHOL_USE: NO
AVERAGE NUMBER OF DAYS PER WEEK YOU HAVE A DRINK CONTAINING ALCOHOL: 0
SUBSTANCE_ABUSE: 0
ON A TYPICAL DAY WHEN YOU DRINK ALCOHOL HOW MANY DRINKS DO YOU HAVE: 0

## 2023-08-10 ASSESSMENT — PAIN DESCRIPTION - PAIN TYPE
TYPE: CHRONIC PAIN

## 2023-08-10 ASSESSMENT — FIBROSIS 4 INDEX: FIB4 SCORE: 1.06

## 2023-08-10 ASSESSMENT — PAIN DESCRIPTION - DESCRIPTORS: DESCRIPTORS: STABBING

## 2023-08-10 NOTE — ASSESSMENT & PLAN NOTE
Patient takes ramipril at home.  We have also started the patient on metoprolol.  Monitor and make changes accordingly.

## 2023-08-10 NOTE — ASSESSMENT & PLAN NOTE
Dr Orosco was consulted by ER physician who recommended admitting patient for IV antibiotics  We will continue antibiotics and follow cultures for now.    8/11: Continue with antibiotics.  We appreciate further recommendations with pulmonary.  We have ordered respiratory PCR panel.    8/12: Continue antibiotics. PCR negative.

## 2023-08-10 NOTE — CARE PLAN
The patient is Stable - Low risk of patient condition declining or worsening    Shift Goals  Clinical Goals: pain control 3/10 or less  Patient Goals: pain control  Family Goals: for her to get better    Progress made toward(s) clinical / shift goals:  No complains of chest pain through out the shift    Patient is not progressing towards the following goals:

## 2023-08-10 NOTE — ED NOTES
Med rec completed per pt, son, and home pharmacy ([facility])   Allergies reviewed     [facility] recently [facility] Atorvastatin 40 mg daily but pt and son state that pt does not take this medication

## 2023-08-10 NOTE — ASSESSMENT & PLAN NOTE
Patient is an ex-smoker and was found to have pulmonary mass on CT scan on admission.  We have consulted pulmonary, we appreciate further recommendations.    8/11: I discussed case with pulmonary, and they are concerned that this could be atelectasis.  They would rather have the patient undergo a PET scan before doing any procedure.  They will help set up the PET scan as an outpatient.  We appreciate further recommendations.

## 2023-08-10 NOTE — PROGRESS NOTES
4 Eyes Skin Assessment Completed by TONYA Rojas and TONYA Comer.    Head WDL  Ears WDL  Nose WDL  Mouth WDL  Neck WDL  Breast/Chest WDL  Shoulder Blades WDL  Spine Scar  (R) Arm/Elbow/Hand Discoloration  (L) Arm/Elbow/Hand Discoloration  Abdomen Scar  Groin WDL  Scrotum/Coccyx/Buttocks WDL  (R) Leg WDL  (L) Leg WDL  (R) Heel/Foot/Toe WDL  (L) Heel/Foot/Toe WDL          Devices In Places Blood Pressure Cuff and Pulse Ox      Interventions In Place Pillows    Possible Skin Injury No    Pictures Uploaded Into Epic N/A  Wound Consult Placed N/A  RN Wound Prevention Protocol Ordered No

## 2023-08-10 NOTE — ED PROVIDER NOTES
"CHIEF COMPLAINT  Chief Complaint   Patient presents with    Chest Pain    Back Pain    Shortness of Breath     Pt reports sob and chest pain that radiates down left arm x the last couple of hours.  Pt reports lower back pain; hx back surgery.  Pt has leukemia stage 4; not undergoing chemo/radiation at the moment       LIMITATION TO HISTORY   Select: none    HPI    Vaishali Emery is a 68 y.o. female who presents to the Emergency Department with a history of leukemia, she thinks that CLL started having increased back pain last night with associated SOB and chest pain. The patient has a history of multiple back surgeries and currently is on 30mg of oxycodone every 4 hours as needed for pain and this pain is not being controlled by this medication. She denies any fevers chill, coughing, abdominal pain or any other symptoms. She is here for evaluation    OUTSIDE HISTORIAN(S):  Select: Son is at bedside     EXTERNAL RECORDS REVIEWED  Select: Other Infusion center for [facility] infusion 8/8/2023 last note was an office visit 5/12/2022 for back pain.      PAST MEDICAL HISTORY  Past Medical History:   Diagnosis Date    Anesthesia     \"difficulty breathing\"    Angina     none currently     Angina pectoris 6/19/2009    Arthritis     Backpain 1998    Bowel habit changes     constipation    Breath shortness     hx of, not currently    CAD (coronary artery disease)     Cancer (HCC)     skin    Cervical radiculopathy 4/1/2009    Chronic pain 6/29/2011    COPD (chronic obstructive pulmonary disease) ([facility]) 4/1/2009    Dental disorder     upper/lower dentures    Depression 4/1/2009    Dyslipidemia 6/29/2011    Headache(784.0) 4/1/2009    Heart burn     High cholesterol     History of anemia     History of cardiac catheterization 4/1/2009    History of coronary artery bypass graft 4/1/2009    History of gastrointestinal bleeding 4/1/2009    History of hypertension 6/19/2009    History of mixed hyperlipidemia 6/19/2009    Lumbar " radiculopathy 4/1/2009    Pneumonia 1990    approximate date    PONV (postoperative nausea and vomiting)     Sepsis (HCC) 2006 or 2007    Sleep apnea     before gastric bypass, no CPAP now    Snoring     Unspecified cataract     IOLS     .    SURGICAL HISTORY  Past Surgical History:   Procedure Laterality Date    PB RECONSTR TOTAL SHOULDER IMPLANT Left 9/15/2020    Procedure: ARTHROPLASTY, SHOULDER, TOTAL;  Surgeon: Juventino Greene M.D.;  Location: SURGERY [facility];  Service: Orthopedics    BICEPS TENODESIS Left 9/15/2020    Procedure: TENODESIS, BICEPS - AND ADDISON;  Surgeon: Juventino Greene M.D.;  Location: SURGERY [facility];  Service: Orthopedics    HARDWARE REMOVAL NEURO N/A 6/23/2016    Procedure: HARDWARE REMOVAL NEURO L3-S1;  Surgeon: Aaron Martines M.D.;  Location: SURGERY [facility];  Service:     CERVICAL DISK AND FUSION ANTERIOR  4/28/2016    Procedure: CERVICAL DISK AND FUSION ANTERIOR Cervical4-5 WITH PEEK INTERBODY;  Surgeon: Aaron Martines M.D.;  Location: SURGERY [facility];  Service:     BASAL CELL EXCISION  2/19/2015    Performed by Adán Blake Jr., M.D. at SURGERY [facility]    FLAP CLOSURE  2/19/2015    Performed by Adán Blake Jr., M.D. at SURGERY [facility]    CATARACT EXTRACTION WITH IOL Bilateral 2015    OTHER ABDOMINAL SURGERY  2007    gastric bypass    OTHER  2007    neck surgery    OTHER NEUROLOGICAL SURG  2007,    6 back surgeries (at ages 13, 18, 20, 37, and 52)    OTHER CARDIAC SURGERY  2006    bypass x 4 vessels    ARLEN HOLES           FAMILY HISTORY  Family History   Problem Relation Age of Onset    Cancer Father         brain          SOCIAL HISTORY  Social History     Socioeconomic History    Marital status:      Spouse name: Not on file    Number of children: Not on file    Years of education: Not on file    Highest education level: Not on file   Occupational History    Not on file   Tobacco Use    Smoking status: Former     Packs/day: 0.50      Years: 40.00     Pack years: 20.00     Types: Cigarettes     Quit date: 2006     Years since quittin.5    Smokeless tobacco: Never   Vaping Use    Vaping Use: Every day    Substances: Nicotine    Devices: Pre-filled or refillable cartridge   Substance and Sexual Activity    Alcohol use: No    Drug use: No    Sexual activity: Not on file   Other Topics Concern    Not on file   Social History Narrative    Not on file     Social Determinants of Health     Financial Resource Strain: Not on file   Food Insecurity: Not on file   Transportation Needs: Not on file   Physical Activity: Not on file   Stress: Not on file   Social Connections: Not on file   Intimate Partner Violence: Not on file   Housing Stability: Not on file         CURRENT MEDICATIONS  No current facility-administered medications on file prior to encounter.     Current Outpatient Medications on File Prior to Encounter   Medication Sig Dispense Refill    methylPREDNISolone (MEDROL DOSEPAK) 4 MG Tablet Therapy Pack Follow schedule on package instructions. (Patient not taking: Reported on 5/3/2022) 21 Tablet 0    lidocaine (XYLOCAINE) 2 % Solution Take 15 mL by mouth every four hours as needed for Throat/Mouth Pain. Gargle and spit every 4 hours as needed (Patient not taking: Reported on 5/3/2022) 120 mL 0    [employer] ASPIRIN ADULT LOW STRENGTH 81 MG EC tablet TAKE 1 TABLET BY MOUTH EVERY DAY 90 tablet 4    atorvastatin (LIPITOR) 10 MG Tab TAKE 1 TABLET BY MOUTH EVERY DAY (Patient not taking: No sig reported) 90 tablet 3    DULoxetine (CYMBALTA) 60 MG Cap DR Particles delayed-release capsule Take 1 Cap by mouth 2 Times a Day.      oxyCODONE (OXY-IR) 30 MG immediate release tablet Take 30-60 mg by mouth every four hours as needed.      fentaNYL (DURAGESIC) 50 MCG/HR PATCH 72 HR       fluticasone (FLONASE) 50 MCG/ACT nasal spray Spray 1 Spray in nose every day. 16 g 0    promethazine (PHENERGAN) 25 MG Tab Take 25 mg by mouth every 6 hours as needed for  "Nausea/Vomiting.      calcium carbonate (TUMS) 500 MG Chew Tab Take 500 mg by mouth as needed (For hertburn).      gabapentin (NEURONTIN) 800 MG tablet Take 800 mg by mouth 3 times a day.             ALLERGIES  Allergies   Allergen Reactions    Povidone Iodine     Povidone Iodine Rash     rash       PHYSICAL EXAM  VITAL SIGNS:BP (!) 150/72   Pulse 74   Temp 35.9 °C (96.7 °F) (Temporal)   Resp 20   Ht 1.549 m (5' 1\")   Wt 58.9 kg (129 lb 13.6 oz)   SpO2 91%   BMI 24.54 kg/m²     Constitutional: Well-developed no acute distress   HENT: Normocephalic, Atraumatic, Bilateral external ears normal.  Eyes:  conjunctiva are normal.   Neck: Supple.  Nontender midline  Cardiovascular: Regular rate and rhythm without murmurs gallops or rubs.   Thorax & Lungs: No respiratory distress. Breathing comfortably. Lungs are clear to auscultation bilaterally, there are no wheezes no rales. Chest wall is nontender.  Abdomen: Soft, non distended, non tender   Skin: Warm, Dry, No erythema,   Back: tender about the left paraspinous region left flank. No significant midline tenderness  Musculoskeletal: No clubbing cyanosis or edema good range of motion   Neurologic: Alert & oriented x 3, normal sensation moving all extremities appears normal no calf tenderness  Psychiatric: Affect normal, Judgment normal, Mood normal.       DIAGNOSTIC STUDIES / PROCEDURES  EKG  I have independently interpreted this EKG  Interpreted below by myself normal sinus rhythm with a rate of 78 Axis is normal at 2.  P waves appear normal.  There is no acute ST or T wave changes and otherwise normal EKG.  Unchanged from prior EKG.    LABS  Results for orders placed or performed during the hospital encounter of 08/10/23   CBC WITH DIFFERENTIAL   Result Value Ref Range    WBC 28.3 (H) 4.8 - 10.8 K/uL    RBC 3.50 (L) 4.20 - 5.40 M/uL    Hemoglobin 11.0 (L) 12.0 - 16.0 g/dL    Hematocrit 35.2 (L) 37.0 - 47.0 %    .6 (H) 81.4 - 97.8 fL    MCH 31.4 27.0 - 33.0 " pg    MCHC 31.3 (L) 32.2 - 35.5 g/dL    RDW 57.0 (H) 35.9 - 50.0 fL    Platelet Count 367 164 - 446 K/uL    MPV 10.1 9.0 - 12.9 fL    Neutrophils-Polys 15.00 (L) 44.00 - 72.00 %    Lymphocytes 79.00 (H) 22.00 - 41.00 %    Monocytes 4.00 0.00 - 13.40 %    Eosinophils 0.00 0.00 - 6.90 %    Basophils 0.00 0.00 - 1.80 %    Nucleated RBC 0.10 0.00 - 0.20 /100 WBC    Neutrophils (Absolute) 4.81 1.82 - 7.42 K/uL    Lymphs (Absolute) 22.36 (H) 1.00 - 4.80 K/uL    Monos (Absolute) 1.13 (H) 0.00 - 0.85 K/uL    Eos (Absolute) 0.00 0.00 - 0.51 K/uL    Baso (Absolute) 0.00 0.00 - 0.12 K/uL    NRBC (Absolute) 0.04 K/uL   COMP METABOLIC PANEL   Result Value Ref Range    Sodium 140 135 - 145 mmol/L    Potassium 5.1 3.6 - 5.5 mmol/L    Chloride 105 96 - 112 mmol/L    Co2 24 20 - 33 mmol/L    Anion Gap 11.0 7.0 - 16.0    Glucose 106 (H) 65 - 99 mg/dL    Bun 11 8 - 22 mg/dL    Creatinine 0.70 0.50 - 1.40 mg/dL    Calcium 9.2 8.4 - 10.2 mg/dL    Correct Calcium 8.8 8.5 - 10.5 mg/dL    AST(SGOT) 21 12 - 45 U/L    ALT(SGPT) 14 2 - 50 U/L    Alkaline Phosphatase 96 30 - 99 U/L    Total Bilirubin 0.2 0.1 - 1.5 mg/dL    Albumin 4.5 3.2 - 4.9 g/dL    Total Protein 7.1 6.0 - 8.2 g/dL    Globulin 2.6 1.9 - 3.5 g/dL    A-G Ratio 1.7 g/dL   TROPONIN   Result Value Ref Range    Troponin T 22 (H) 6 - 19 ng/L   ESTIMATED GFR   Result Value Ref Range    GFR (CKD-EPI) 93 >60 mL/min/1.73 m 2   DIFFERENTIAL MANUAL   Result Value Ref Range    Bands-Stabs 2.00 0.00 - 10.00 %    Manual Diff Status PERFORMED    PLATELET ESTIMATE   Result Value Ref Range    Plt Estimation Normal    MORPHOLOGY   Result Value Ref Range    RBC Morphology Normal     Reactive Lymphocytes Moderate          RADIOLOGY  I have independently interpreted the diagnostic imaging associated with this visit and am waiting the final reading from the radiologist.   My preliminary interpretation is as follows: No acute consolidation on x-ray      Radiologist interpretation:   CT-LSPINE W/O  PLUS RECONS   Final Result      1.  No evidence of acute fracture of the lumbar spine.      2.  Surgical change extending from L4 through S1.      3.  Multilevel degenerative disc disease and facet degeneration.      CT-CTA CHEST PULMONARY ARTERY W/ RECONS   Final Result      1.  No evidence of pulmonary embolus.      2.  3.8 x 2.8 cm mass within the right lower lobe peripherally within the posterior sulcus. This may represent an area of round atelectasis however neoplasm cannot be excluded.      3.  Small right pleural effusion with right lung base atelectasis.      4.  Enlarged mediastinal, hilar and axillary lymph nodes.      5.  Ectasia of the ascending aorta measured at 4.2 x 4.1 cm in diameter.      DX-CHEST-PORTABLE (1 VIEW)   Final Result      No acute process.           COURSE & MEDICAL DECISION MAKING    ED COURSE:    ED Observation Status? Yes;I am placing the patient in to an observation status due to a diagnostic uncertainty as well as therapeutic intensity. Patient placed in observation status at 6:11 AM 8/10/2023    Observation plan is as follows: Patient presents with significant back pain right flank region.  I will start the patient with fluid bolus as well as pain medications and evaluate for the potential of a pulmonary embolism since the patient does have a history of CLL and is certainly at risk.  I will observe the patient for improvement.    INTERVENTIONS BY ME:  Medications   HYDROmorphone (Dilaudid) injection 1 mg (1 mg Intravenous Given 8/10/23 0716)   ampicillin/sulbactam (Unasyn) 3 g in  mL IVPB (has no administration in time range)   azithromycin (Zithromax) tablet 500 mg (has no administration in time range)   ketamine (Ketalar) 25 mg in NS 50 mL (low dose pain infusion) (0 mg Intravenous Stopped 8/10/23 0825)   lactated ringers (LR) bolus (1,000 mL Intravenous New Bag 8/10/23 0848)   ondansetron (Zofran) syringe/vial injection 4 mg (4 mg Intravenous Given 8/10/23 0716)   iohexol  (OMNIPAQUE) 350 mg/mL (IV) (64 mL Intravenous Given 8/10/23 0818)       Response on recheck: Patient has improved with pain medications however CT scan does show consolidation in the right lower lobe.  I did discuss the case with Dr. Orosco.  The patient's white blood cell count was 24 2 days ago now to 28 is very possible that this is a pneumonia and he is concerned that the patient might get significantly worse quickly and recommended at least an overnight antibiotic treatment..      I have discussed management of the patient with the following physicians and sources:   Dr. Orosco oncology, hospitalist    Patient discharged from ED observation at 9:04 AM 08/10/23 escalate the patient to admission after discussion with Dr. Orosco.      INITIAL ASSESSMENT, COURSE AND PLAN  Care Narrative: Patient presents emerged department for evaluation.  The patient complaining of the right-sided flank pain as described above.  I did do a CT scan to rule out a pulmonary embolism or significant intrathoracic abnormalities despite a normal chest x-ray.  In the right lower lobe there is a small consolidating mass possible atelectasis with surrounding fluid.  Patient's white blood cell count has gone up from her 24-28 it is very possible that this could be infectious so I empirically start the patient on antibiotics and I reached out to Dr. Orosco.  Dr. Orosco is concerned that the patient might get significantly worse because of her CLL and requested the patient be admitted overnight for observation and IV antibiotic treatment.  Patient does have significant improvement from her pain with a singular dose of 1 mg of Dilaudid and then 25 mg ketamine as an slow infusion.  Patient does feel better from her pain control.  She will require continued pain control with her current dose of 30 mg of oxycodone and I will reach out to the hospitalist for admission.  I have also ordered for procalcitonin to help delineate this mass as  well.      HYDRATION: Based on the patient's presentation of potential infection,  the patient was given IV fluids. IV Hydration was used because oral hydration is unable to be done due to the patient's symptoms. Upon recheck following hydration, the patient was improving with pain.           ADDITIONAL PROBLEM LIST  1.  History of CLL not undergoing any chemotherapy current oncologist is Dr. Orosco      DISPOSITION AND DISCUSSIONS  Patient will be admitted for further treatment and care.    FINAL DIAGNOSIS  1. Pneumonia of right lower lobe due to infectious organism    2. Chronic right-sided low back pain without sciatica    3. CLL (chronic lymphocytic leukemia) (HCC)             Electronically signed by: Matthew Chairez M.D.,9:08 AM 08/10/23

## 2023-08-10 NOTE — ED NOTES
Patient resting on gurney, son at bedside. Patient updated on plan of care, denies questions at this time.

## 2023-08-10 NOTE — PROGRESS NOTES
1145 admitted pt on the floor. Vitals taken.    1250 checked on pt.  Pt. asking meds for nausea, RN aware.     1400 pt eating lunch with family members    1730 assisted pt to the bathroom

## 2023-08-10 NOTE — ASSESSMENT & PLAN NOTE
Concern for pneumonia, will continue with antibiotics.  The patient also with history of CLL as well as pulmonary mass on CT scan on admission.    8/11: Only slight improvement.  Continue antibiotics, follow cultures.    8/12: We will consult oncology to see if the increase in WBC is due to CLL

## 2023-08-10 NOTE — ED NOTES
Pt resting on gurney, laying on R side, pt in no acute distress, pt provided call light, instructed to call if needing any assistance, instructed not to get up by self, gurney in lowest position.   Son gone from bedside.

## 2023-08-10 NOTE — ASSESSMENT & PLAN NOTE
Patient with a history of coronary bypass.  Patient denying chest pain at this time.  The patient does state that she takes aspirin and atorvastatin at home, it is unclear of the dose so we will start the patient on atorvastatin 40 and continue with baby aspirin.  The patient also takes ACE inhibitor, however it does not clear if she takes a beta-blocker.  Will start the patient on metoprolol 12.5 mg twice daily  The patient states that she will have her son bring in a complete list of medication she takes at home.

## 2023-08-10 NOTE — ASSESSMENT & PLAN NOTE
Patient with history of CLL.  The patient follows with Dr. Orosco  She will require close follow-up as an outpatient.    8/12: We will consult oncology to see if the increase in WBC is due to CLL

## 2023-08-10 NOTE — ED TRIAGE NOTES
"Chief Complaint   Patient presents with    Chest Pain    Back Pain    Shortness of Breath     Pt reports sob and chest pain that radiates down left arm x the last couple of hours.  Pt reports lower back pain; hx back surgery.  Pt has leukemia stage 4; not undergoing chemo/radiation at the moment     /79   Pulse 82   Temp 35.9 °C (96.7 °F) (Temporal)   Resp 20   Ht 1.549 m (5' 1\")   Wt 58.9 kg (129 lb 13.6 oz)   SpO2 93%   BMI 24.54 kg/m²     "

## 2023-08-10 NOTE — H&P
"Hospital Medicine History & Physical Note    Date of Service  8/10/2023    Primary Care Physician  Apple Gates M.D.    Consultants  pulmonary    Specialist Names: Dr Madera    Code Status  Full Code    Chief Complaint  Chief Complaint   Patient presents with    Chest Pain    Back Pain    Shortness of Breath     Pt reports sob and chest pain that radiates down left arm x the last couple of hours.  Pt reports lower back pain; hx back surgery.  Pt has leukemia stage 4; not undergoing chemo/radiation at the moment       History of Presenting Illness  Vaishali Emery is a 68 y.o. female past medical history of CLL, coronary artery disease status post bypass, depression, dyslipidemia, hypertension, ex-smoker who presented 8/10/2023 with generalized weakness, shortness of breath, cough, and chills.    Patient is not a very good historian.  The patient states that she started feeling generalized weakness for a \"couple of days\".  The patient describes feeling shortness of breath, increased cough, fever and chills at home.  Due to the fact that she was negative better she decided to come to the ER for further assessment and evaluation.    In the ER the patient was found to have a white blood cell count of 28.3.  CT scan concerning for pulmonary mass.  ER physician consulted with the patient's oncologist who recommended admitting the patient for IV antibiotics as this could be pneumonia.  We have consulted pulmonary for potential bronchoscopy.    I discussed the plan of care with patient, bedside RN, charge RN, , and ER Physician and Pulmonary .    Review of Systems  Review of Systems   Constitutional:  Positive for chills, fever and malaise/fatigue.   HENT:  Negative for hearing loss and nosebleeds.    Eyes:  Negative for blurred vision and double vision.   Respiratory:  Positive for cough and shortness of breath.    Cardiovascular:  Negative for chest pain and palpitations.   Gastrointestinal:  " Negative for abdominal pain, heartburn and vomiting.   Genitourinary:  Negative for dysuria and urgency.   Musculoskeletal:  Negative for back pain and falls.   Skin:  Negative for itching and rash.   Neurological:  Positive for weakness (generalized). Negative for dizziness and headaches.   Psychiatric/Behavioral:  Negative for substance abuse. The patient is not nervous/anxious.    All other systems reviewed and are negative.      Past Medical History   has a past medical history of Anesthesia, Angina, Angina pectoris (6/19/2009), Arthritis, Backpain (1998), Bowel habit changes, Breath shortness, CAD (coronary artery disease), Cancer ([facility]), Cervical radiculopathy (4/1/2009), Chronic pain (6/29/2011), COPD (chronic obstructive pulmonary disease) ([facility]) (4/1/2009), Dental disorder, Depression (4/1/2009), Dyslipidemia (6/29/2011), Headache(784.0) (4/1/2009), Heart burn, High cholesterol, History of anemia, History of cardiac catheterization (4/1/2009), History of coronary artery bypass graft (4/1/2009), History of gastrointestinal bleeding (4/1/2009), History of hypertension (6/19/2009), History of mixed hyperlipidemia (6/19/2009), Lumbar radiculopathy (4/1/2009), Pneumonia (1990), PONV (postoperative nausea and vomiting), Sepsis ([facility]) (2006 or 2007), Sleep apnea, Snoring, and Unspecified cataract.    Surgical History   has a past surgical history that includes other abdominal surgery (2007); other (2007); other cardiac surgery (2006); basal cell excision (2/19/2015); flap closure (2/19/2015); ritika holes; other neurological surg (2007,); hardware removal neuro (N/A, 6/23/2016); cataract extraction with iol (Bilateral, 2015); cervical disk and fusion anterior (4/28/2016); pr reconstr total shoulder implant (Left, 9/15/2020); and biceps tenodesis (Left, 9/15/2020).     Family History  family history includes Cancer in her father.       Social History   reports that she quit smoking about 17 years ago. Her smoking use  included cigarettes. She has a 20.00 pack-year smoking history. She has never used smokeless tobacco. She reports that she does not drink alcohol and does not use drugs.    Allergies  Allergies   Allergen Reactions    Povidone Iodine Rash     rash       Medications  Prior to Admission Medications   Prescriptions Last Dose Informant Patient Reported? Taking?   DULoxetine (CYMBALTA) 60 MG Cap DR Particles delayed-release capsule 8/10/2023 at AM Patient, Family Member, Patient's Home Pharmacy Yes No   Sig: Take 60 mg by mouth every day.   baclofen (LIORESAL) 20 MG tablet PRN at PRN Patient, Family Member, Patient's Home Pharmacy Yes Yes   Sig: Take 20 mg by mouth 2 times a day as needed. Indications: Muscle Spasm   gabapentin (NEURONTIN) 800 MG tablet 8/9/2023 at 2100 Patient, Family Member, Patient's Home Pharmacy Yes No   Sig: Take 800 mg by mouth 3 times a day as needed (Pain).   oxyCODONE (OXY-IR) 30 MG immediate release tablet 8/10/2023 at 0330 Patient, Family Member, Patient's Home Pharmacy Yes No   Sig: Take 30 mg by mouth 5 Times a Day.   ramipril (ALTACE) 10 MG capsule 8/9/2023 at AM Patient, Family Member, Patient's Home Pharmacy Yes Yes   Sig: Take 10 mg by mouth every day.      Facility-Administered Medications: None       Physical Exam  Temp:  [35.9 °C (96.7 °F)] 35.9 °C (96.7 °F)  Pulse:  [74-82] 74  Resp:  [20] 20  BP: (139-150)/(72-79) 150/72  SpO2:  [91 %-93 %] 91 %  Blood Pressure : (!) 150/72   Temperature: 35.9 °C (96.7 °F)   Pulse: 74   Respiration: 20   Pulse Oximetry: 91 %       Physical Exam  Vitals and nursing note reviewed.   Constitutional:       General: She is not in acute distress.     Appearance: She is ill-appearing.      Comments: frail   HENT:      Head: Normocephalic and atraumatic.      Right Ear: External ear normal.      Left Ear: External ear normal.      Mouth/Throat:      Pharynx: No oropharyngeal exudate or posterior oropharyngeal erythema.   Eyes:      General:         Right  eye: No discharge.         Left eye: No discharge.   Cardiovascular:      Rate and Rhythm: Normal rate and regular rhythm.      Pulses: Normal pulses.      Heart sounds: No murmur heard.     No gallop.   Pulmonary:      Breath sounds: Rhonchi present. No wheezing.   Abdominal:      General: Bowel sounds are normal. There is no distension.      Palpations: Abdomen is soft.      Tenderness: There is no abdominal tenderness. There is no guarding.   Musculoskeletal:         General: No swelling or tenderness. Normal range of motion.      Cervical back: Normal range of motion and neck supple. No tenderness.   Skin:     General: Skin is warm and dry.   Neurological:      General: No focal deficit present.      Mental Status: She is alert and oriented to person, place, and time. Mental status is at baseline.      Motor: No weakness.   Psychiatric:         Mood and Affect: Mood normal.         Behavior: Behavior normal.         Laboratory:  Recent Labs     08/10/23  0639   WBC 28.3*   RBC 3.50*   HEMOGLOBIN 11.0*   HEMATOCRIT 35.2*   .6*   MCH 31.4   MCHC 31.3*   RDW 57.0*   PLATELETCT 367   MPV 10.1     Recent Labs     08/10/23  0639   SODIUM 140   POTASSIUM 5.1   CHLORIDE 105   CO2 24   GLUCOSE 106*   BUN 11   CREATININE 0.70   CALCIUM 9.2     Recent Labs     08/10/23  0639   ALTSGPT 14   ASTSGOT 21   ALKPHOSPHAT 96   TBILIRUBIN 0.2   GLUCOSE 106*         No results for input(s): NTPROBNP in the last 72 hours.      Recent Labs     08/10/23  0639   TROPONINT 22*       Imaging:  CT-LSPINE W/O PLUS RECONS   Final Result      1.  No evidence of acute fracture of the lumbar spine.      2.  Surgical change extending from L4 through S1.      3.  Multilevel degenerative disc disease and facet degeneration.      CT-CTA CHEST PULMONARY ARTERY W/ RECONS   Final Result      1.  No evidence of pulmonary embolus.      2.  3.8 x 2.8 cm mass within the right lower lobe peripherally within the posterior sulcus. This may represent  an area of round atelectasis however neoplasm cannot be excluded.      3.  Small right pleural effusion with right lung base atelectasis.      4.  Enlarged mediastinal, hilar and axillary lymph nodes.      5.  Ectasia of the ascending aorta measured at 4.2 x 4.1 cm in diameter.      DX-CHEST-PORTABLE (1 VIEW)   Final Result      No acute process.              Assessment/Plan:    * Pneumonia  Assessment & Plan  Dr Orosco was consulted by ER physician who recommended admitting patient for IV antibiotics  We will continue antibiotics and follow cultures for now.    Generalized weakness  Assessment & Plan  PT/OT    Leukocytosis  Assessment & Plan  Concern for pneumonia, will continue with antibiotics.  The patient also with history of CLL as well as pulmonary mass on CT scan on admission.    Pulmonary mass  Assessment & Plan  Patient is an ex-smoker and was found to have pulmonary mass on CT scan on admission.  We have consulted pulmonary, we appreciate further recommendations.    Leukemia (HCC)  Assessment & Plan  Patient with history of CLL.  The patient follows with Dr. Orosco  She will require close follow-up as an outpatient.    HTN (hypertension)- (present on admission)  Assessment & Plan  Patient takes ramipril at home.  We have also started the patient on metoprolol.  Monitor and make changes accordingly.    Depression- (present on admission)  Assessment & Plan  Continue home duloxetine.    Dyslipidemia- (present on admission)  Assessment & Plan  Continue atorvastatin 40 mg.    CAD (coronary artery disease)- (present on admission)  Assessment & Plan  Patient with a history of coronary bypass.  Patient denying chest pain at this time.  The patient does state that she takes aspirin and atorvastatin at home, it is unclear of the dose so we will start the patient on atorvastatin 40 and continue with baby aspirin.  The patient also takes ACE inhibitor, however it does not clear if she takes a beta-blocker.  Will start  the patient on metoprolol 12.5 mg twice daily  The patient states that she will have her son bring in a complete list of medication she takes at home.        VTE prophylaxis: heparin ppx    Spend at least 76 minutes providing care for this patient.  This included face-to-face interview, physical examination.  Review of lab work including CBC, CMP.  Review of imaging study including CT scan.  Discussing with the ER physician.  Consulting and discussing with pulmonary physician.  Discussing with multidisciplinary team including nursing staff, case management.  Creating plan of care, reviewing orders.

## 2023-08-11 DIAGNOSIS — R91.8 PULMONARY MASS: ICD-10-CM

## 2023-08-11 PROBLEM — E83.42 HYPOMAGNESEMIA: Status: ACTIVE | Noted: 2023-08-11

## 2023-08-11 PROBLEM — D64.9 ANEMIA: Status: ACTIVE | Noted: 2023-08-11

## 2023-08-11 LAB
ALBUMIN SERPL BCP-MCNC: 3.2 G/DL (ref 3.2–4.9)
ALBUMIN/GLOB SERPL: 1.6 G/DL
ALP SERPL-CCNC: 74 U/L (ref 30–99)
ALT SERPL-CCNC: 9 U/L (ref 2–50)
ANION GAP SERPL CALC-SCNC: 10 MMOL/L (ref 7–16)
AST SERPL-CCNC: 21 U/L (ref 12–45)
B PARAP IS1001 DNA NPH QL NAA+NON-PROBE: NOT DETECTED
B PERT.PT PRMT NPH QL NAA+NON-PROBE: NOT DETECTED
BASOPHILS # BLD AUTO: 0 % (ref 0–1.8)
BASOPHILS # BLD: 0 K/UL (ref 0–0.12)
BILIRUB SERPL-MCNC: 0.2 MG/DL (ref 0.1–1.5)
BUN SERPL-MCNC: 9 MG/DL (ref 8–22)
C PNEUM DNA NPH QL NAA+NON-PROBE: NOT DETECTED
CALCIUM ALBUM COR SERPL-MCNC: 8.8 MG/DL (ref 8.5–10.5)
CALCIUM SERPL-MCNC: 8.2 MG/DL (ref 8.4–10.2)
CHLORIDE SERPL-SCNC: 108 MMOL/L (ref 96–112)
CO2 SERPL-SCNC: 23 MMOL/L (ref 20–33)
CREAT SERPL-MCNC: 0.68 MG/DL (ref 0.5–1.4)
EOSINOPHIL # BLD AUTO: 0.28 K/UL (ref 0–0.51)
EOSINOPHIL NFR BLD: 1 % (ref 0–6.9)
ERYTHROCYTE [DISTWIDTH] IN BLOOD BY AUTOMATED COUNT: 58 FL (ref 35.9–50)
FLUAV RNA NPH QL NAA+NON-PROBE: NOT DETECTED
FLUBV RNA NPH QL NAA+NON-PROBE: NOT DETECTED
GFR SERPLBLD CREATININE-BSD FMLA CKD-EPI: 94 ML/MIN/1.73 M 2
GLOBULIN SER CALC-MCNC: 2 G/DL (ref 1.9–3.5)
GLUCOSE SERPL-MCNC: 106 MG/DL (ref 65–99)
HADV DNA NPH QL NAA+NON-PROBE: NOT DETECTED
HCOV 229E RNA NPH QL NAA+NON-PROBE: NOT DETECTED
HCOV HKU1 RNA NPH QL NAA+NON-PROBE: NOT DETECTED
HCOV NL63 RNA NPH QL NAA+NON-PROBE: NOT DETECTED
HCOV OC43 RNA NPH QL NAA+NON-PROBE: NOT DETECTED
HCT VFR BLD AUTO: 31.3 % (ref 37–47)
HCT VFR BLD AUTO: 31.6 % (ref 37–47)
HGB BLD-MCNC: 9.6 G/DL (ref 12–16)
HGB BLD-MCNC: 9.6 G/DL (ref 12–16)
HMPV RNA NPH QL NAA+NON-PROBE: NOT DETECTED
HPIV1 RNA NPH QL NAA+NON-PROBE: NOT DETECTED
HPIV2 RNA NPH QL NAA+NON-PROBE: NOT DETECTED
HPIV3 RNA NPH QL NAA+NON-PROBE: NOT DETECTED
HPIV4 RNA NPH QL NAA+NON-PROBE: NOT DETECTED
LYMPHOCYTES # BLD AUTO: 16.96 K/UL (ref 1–4.8)
LYMPHOCYTES NFR BLD: 61 % (ref 22–41)
M PNEUMO DNA NPH QL NAA+NON-PROBE: NOT DETECTED
MAGNESIUM SERPL-MCNC: 1.6 MG/DL (ref 1.5–2.5)
MANUAL DIFF BLD: NORMAL
MCH RBC QN AUTO: 31.1 PG (ref 27–33)
MCHC RBC AUTO-ENTMCNC: 30.7 G/DL (ref 32.2–35.5)
MCV RBC AUTO: 101.3 FL (ref 81.4–97.8)
MONOCYTES # BLD AUTO: 1.39 K/UL (ref 0–0.85)
MONOCYTES NFR BLD AUTO: 5 % (ref 0–13.4)
NEUTROPHILS # BLD AUTO: 9.17 K/UL (ref 1.82–7.42)
NEUTROPHILS NFR BLD: 29 % (ref 44–72)
NEUTS BAND NFR BLD MANUAL: 4 % (ref 0–10)
NRBC # BLD AUTO: 0.04 K/UL
NRBC BLD-RTO: 0.1 /100 WBC (ref 0–0.2)
PLATELET # BLD AUTO: 297 K/UL (ref 164–446)
PLATELET BLD QL SMEAR: NORMAL
PMV BLD AUTO: 10.1 FL (ref 9–12.9)
POTASSIUM SERPL-SCNC: 4.3 MMOL/L (ref 3.6–5.5)
PROCALCITONIN SERPL-MCNC: 0.06 NG/ML
PROT SERPL-MCNC: 5.2 G/DL (ref 6–8.2)
RBC # BLD AUTO: 3.09 M/UL (ref 4.2–5.4)
RBC BLD AUTO: NORMAL
RSV RNA NPH QL NAA+NON-PROBE: NOT DETECTED
RV+EV RNA NPH QL NAA+NON-PROBE: NOT DETECTED
SARS-COV-2 RNA NPH QL NAA+NON-PROBE: NOTDETECTED
SODIUM SERPL-SCNC: 141 MMOL/L (ref 135–145)
VARIANT LYMPHS BLD QL SMEAR: NORMAL
WBC # BLD AUTO: 27.8 K/UL (ref 4.8–10.8)

## 2023-08-11 PROCEDURE — 87798 DETECT AGENT NOS DNA AMP: CPT

## 2023-08-11 PROCEDURE — 80053 COMPREHEN METABOLIC PANEL: CPT

## 2023-08-11 PROCEDURE — 770001 HCHG ROOM/CARE - MED/SURG/GYN PRIV*

## 2023-08-11 PROCEDURE — 700105 HCHG RX REV CODE 258: Performed by: INTERNAL MEDICINE

## 2023-08-11 PROCEDURE — 87581 M.PNEUMON DNA AMP PROBE: CPT

## 2023-08-11 PROCEDURE — 85018 HEMOGLOBIN: CPT

## 2023-08-11 PROCEDURE — 99233 SBSQ HOSP IP/OBS HIGH 50: CPT | Performed by: INTERNAL MEDICINE

## 2023-08-11 PROCEDURE — 83735 ASSAY OF MAGNESIUM: CPT

## 2023-08-11 PROCEDURE — 85014 HEMATOCRIT: CPT

## 2023-08-11 PROCEDURE — A9270 NON-COVERED ITEM OR SERVICE: HCPCS | Performed by: INTERNAL MEDICINE

## 2023-08-11 PROCEDURE — 99222 1ST HOSP IP/OBS MODERATE 55: CPT | Performed by: INTERNAL MEDICINE

## 2023-08-11 PROCEDURE — 84145 PROCALCITONIN (PCT): CPT

## 2023-08-11 PROCEDURE — 85007 BL SMEAR W/DIFF WBC COUNT: CPT

## 2023-08-11 PROCEDURE — 700102 HCHG RX REV CODE 250 W/ 637 OVERRIDE(OP): Performed by: INTERNAL MEDICINE

## 2023-08-11 PROCEDURE — 94760 N-INVAS EAR/PLS OXIMETRY 1: CPT

## 2023-08-11 PROCEDURE — 87633 RESP VIRUS 12-25 TARGETS: CPT

## 2023-08-11 PROCEDURE — 85025 COMPLETE CBC W/AUTO DIFF WBC: CPT

## 2023-08-11 PROCEDURE — 87486 CHLMYD PNEUM DNA AMP PROBE: CPT

## 2023-08-11 PROCEDURE — 97161 PT EVAL LOW COMPLEX 20 MIN: CPT

## 2023-08-11 PROCEDURE — 36415 COLL VENOUS BLD VENIPUNCTURE: CPT

## 2023-08-11 PROCEDURE — 700111 HCHG RX REV CODE 636 W/ 250 OVERRIDE (IP): Mod: JZ | Performed by: INTERNAL MEDICINE

## 2023-08-11 RX ORDER — OXYCODONE HYDROCHLORIDE 30 MG/1
30 TABLET ORAL
Status: DISCONTINUED | OUTPATIENT
Start: 2023-08-11 | End: 2023-08-12

## 2023-08-11 RX ORDER — OXYCODONE HYDROCHLORIDE 5 MG/1
5 TABLET ORAL
Status: DISCONTINUED | OUTPATIENT
Start: 2023-08-11 | End: 2023-08-11

## 2023-08-11 RX ORDER — HYDROMORPHONE HYDROCHLORIDE 1 MG/ML
0.25 INJECTION, SOLUTION INTRAMUSCULAR; INTRAVENOUS; SUBCUTANEOUS
Status: DISCONTINUED | OUTPATIENT
Start: 2023-08-11 | End: 2023-08-11

## 2023-08-11 RX ORDER — BACLOFEN 10 MG/1
20 TABLET ORAL 2 TIMES DAILY PRN
Status: DISCONTINUED | OUTPATIENT
Start: 2023-08-11 | End: 2023-08-13 | Stop reason: HOSPADM

## 2023-08-11 RX ORDER — MAGNESIUM SULFATE 1 G/100ML
1 INJECTION INTRAVENOUS ONCE
Status: COMPLETED | OUTPATIENT
Start: 2023-08-11 | End: 2023-08-11

## 2023-08-11 RX ORDER — OXYCODONE HYDROCHLORIDE 10 MG/1
10 TABLET ORAL
Status: DISCONTINUED | OUTPATIENT
Start: 2023-08-11 | End: 2023-08-11

## 2023-08-11 RX ADMIN — RAMIPRIL 10 MG: 2.5 CAPSULE ORAL at 05:18

## 2023-08-11 RX ADMIN — OXYCODONE HYDROCHLORIDE 5 MG: 5 TABLET ORAL at 01:47

## 2023-08-11 RX ADMIN — AMPICILLIN AND SULBACTAM 3 G: 1; 2 INJECTION, POWDER, FOR SOLUTION INTRAMUSCULAR; INTRAVENOUS at 12:18

## 2023-08-11 RX ADMIN — AZITHROMYCIN DIHYDRATE 500 MG: 250 TABLET ORAL at 05:10

## 2023-08-11 RX ADMIN — OXYCODONE HYDROCHLORIDE 30 MG: 30 TABLET ORAL at 18:02

## 2023-08-11 RX ADMIN — AMPICILLIN AND SULBACTAM 3 G: 1; 2 INJECTION, POWDER, FOR SOLUTION INTRAMUSCULAR; INTRAVENOUS at 05:11

## 2023-08-11 RX ADMIN — GABAPENTIN 800 MG: 400 CAPSULE ORAL at 13:25

## 2023-08-11 RX ADMIN — AMPICILLIN AND SULBACTAM 3 G: 1; 2 INJECTION, POWDER, FOR SOLUTION INTRAMUSCULAR; INTRAVENOUS at 23:54

## 2023-08-11 RX ADMIN — METOPROLOL TARTRATE 12.5 MG: 25 TABLET, FILM COATED ORAL at 06:00

## 2023-08-11 RX ADMIN — ASPIRIN 81 MG: 81 TABLET, COATED ORAL at 05:11

## 2023-08-11 RX ADMIN — SENNOSIDES AND DOCUSATE SODIUM 2 TABLET: 50; 8.6 TABLET ORAL at 17:53

## 2023-08-11 RX ADMIN — DULOXETINE HYDROCHLORIDE 60 MG: 30 CAPSULE, DELAYED RELEASE ORAL at 05:10

## 2023-08-11 RX ADMIN — ATORVASTATIN CALCIUM 40 MG: 40 TABLET, FILM COATED ORAL at 17:53

## 2023-08-11 RX ADMIN — METOPROLOL TARTRATE 12.5 MG: 25 TABLET, FILM COATED ORAL at 17:53

## 2023-08-11 RX ADMIN — HYDROMORPHONE HYDROCHLORIDE 0.25 MG: 1 INJECTION, SOLUTION INTRAMUSCULAR; INTRAVENOUS; SUBCUTANEOUS at 10:00

## 2023-08-11 RX ADMIN — OXYCODONE HYDROCHLORIDE 10 MG: 10 TABLET ORAL at 08:45

## 2023-08-11 RX ADMIN — MAGNESIUM SULFATE 1 G: 1 INJECTION INTRAVENOUS at 06:09

## 2023-08-11 RX ADMIN — HEPARIN SODIUM 5000 UNITS: 5000 INJECTION, SOLUTION INTRAVENOUS; SUBCUTANEOUS at 15:01

## 2023-08-11 RX ADMIN — GABAPENTIN 800 MG: 400 CAPSULE ORAL at 05:17

## 2023-08-11 RX ADMIN — HEPARIN SODIUM 5000 UNITS: 5000 INJECTION, SOLUTION INTRAVENOUS; SUBCUTANEOUS at 05:09

## 2023-08-11 RX ADMIN — HYDROMORPHONE HYDROCHLORIDE 0.25 MG: 1 INJECTION, SOLUTION INTRAMUSCULAR; INTRAVENOUS; SUBCUTANEOUS at 03:25

## 2023-08-11 RX ADMIN — HEPARIN SODIUM 5000 UNITS: 5000 INJECTION, SOLUTION INTRAVENOUS; SUBCUTANEOUS at 22:02

## 2023-08-11 RX ADMIN — AMPICILLIN AND SULBACTAM 3 G: 1; 2 INJECTION, POWDER, FOR SOLUTION INTRAMUSCULAR; INTRAVENOUS at 17:57

## 2023-08-11 RX ADMIN — OXYCODONE HYDROCHLORIDE 30 MG: 30 TABLET ORAL at 12:13

## 2023-08-11 RX ADMIN — GABAPENTIN 800 MG: 400 CAPSULE ORAL at 17:53

## 2023-08-11 RX ADMIN — OXYCODONE HYDROCHLORIDE 30 MG: 30 TABLET ORAL at 15:00

## 2023-08-11 RX ADMIN — SENNOSIDES AND DOCUSATE SODIUM 2 TABLET: 50; 8.6 TABLET ORAL at 05:17

## 2023-08-11 RX ADMIN — OXYCODONE HYDROCHLORIDE 30 MG: 30 TABLET ORAL at 22:02

## 2023-08-11 ASSESSMENT — COGNITIVE AND FUNCTIONAL STATUS - GENERAL
SUGGESTED CMS G CODE MODIFIER MOBILITY: CH
MOBILITY SCORE: 24

## 2023-08-11 ASSESSMENT — ENCOUNTER SYMPTOMS
COUGH: 1
CHILLS: 1
DIZZINESS: 0
GASTROINTESTINAL NEGATIVE: 1
WEAKNESS: 1
PALPITATIONS: 0
DOUBLE VISION: 0
NERVOUS/ANXIOUS: 1
CARDIOVASCULAR NEGATIVE: 1
ABDOMINAL PAIN: 0
EYES NEGATIVE: 1
RESPIRATORY NEGATIVE: 1
MUSCULOSKELETAL NEGATIVE: 1
SHORTNESS OF BREATH: 1
BLURRED VISION: 0
HEARTBURN: 0
VOMITING: 0
DIZZINESS: 1
FALLS: 0
NERVOUS/ANXIOUS: 0
BACK PAIN: 0
HEADACHES: 0

## 2023-08-11 ASSESSMENT — PAIN DESCRIPTION - PAIN TYPE
TYPE: CHRONIC PAIN
TYPE: ACUTE PAIN
TYPE: CHRONIC PAIN
TYPE: ACUTE PAIN;CHRONIC PAIN
TYPE: CHRONIC PAIN
TYPE: CHRONIC PAIN

## 2023-08-11 ASSESSMENT — PATIENT HEALTH QUESTIONNAIRE - PHQ9
2. FEELING DOWN, DEPRESSED, IRRITABLE, OR HOPELESS: NOT AT ALL
SUM OF ALL RESPONSES TO PHQ9 QUESTIONS 1 AND 2: 0
1. LITTLE INTEREST OR PLEASURE IN DOING THINGS: NOT AT ALL

## 2023-08-11 ASSESSMENT — GAIT ASSESSMENTS
GAIT LEVEL OF ASSIST: SUPERVISED
DEVIATION: NO DEVIATION
DISTANCE (FEET): 150

## 2023-08-11 ASSESSMENT — LIFESTYLE VARIABLES: SUBSTANCE_ABUSE: 0

## 2023-08-11 NOTE — PROGRESS NOTES
"Received report from night shift RN. Pt is A&O x4, self to SBA due to IV, cardiac diet, 1L NC. Precautions put in place bed in the lowest position and call light within reach.    0700: Pt ambulated to bathroom and bed self to SBA. Pt is comfortably back in bed no other needs at the moment.     0800: Pt is awake eating breakfast comfortably in bed.  0812: Pt complains of pain RN notified.     0900: Pt is awake on the phone no other needs at the moment.  0928: PT is working with pt.     1000: Pt is asleep chest observed rising up/down and breathing. No other needs at the moment.   1020: CNA took vitals.    1100: Pt complains of pain and keeps demanding \"I need more drugs\" RN notified.    1200: Pt is comfortably in bed eating lunch no other needs at the moment.    1300: Pt is awake with family at bedside. No other needs at the moment.     1400: Pt is asleep chest observed rising up/down and breathing. No other needs at the moment.     1500:Pt is asleep chest observed rising up/down and breathing. No other needs at the moment.     1600:Pt is asleep chest observed rising up/down and breathing. No other needs at the moment.   1619: CNA took vitals.     1700: Pt is awake eating dinner comfortably in bed. No other needs at the moment.    1800:Pt is awake eating dinner comfortably in bed. No other needs at the moment.    1900: RN to night shift CNA report given.   "

## 2023-08-11 NOTE — CARE PLAN
Problem: Pain - Standard  Goal: Alleviation of pain or a reduction in pain to the patient’s comfort goal  Outcome: Progressing     Problem: Knowledge Deficit - Standard  Goal: Patient and family/care givers will demonstrate understanding of plan of care, disease process/condition, diagnostic tests and medications  Outcome: Progressing   The patient is Stable - Low risk of patient condition declining or worsening    Shift Goals  Clinical Goals: Maintain SpO2 >/= 90% throughout shift; titrate supplemental O2 as needed, pain control  Patient Goals: Stay involved in care plan; stay informed re: tests and imaging studies  Family Goals: n/a    Progress made toward(s) clinical / shift goals:  updated pt on plan of care. Pt reported she takes 30 mg oxycodone five times daily. Pt reporting inadequate pain control with current oxycodone order of 10mg. MD notified. 30 mg given. Will continue to monitor pain.     Patient is not progressing towards the following goals:

## 2023-08-11 NOTE — CARE PLAN
Problem: Pain - Standard  Goal: Alleviation of pain or a reduction in pain to the patient’s comfort goal  Outcome: Progressing  Patient appears comfortably asleep for shift so far     Problem: Hemodynamics  Goal: Patient's hemodynamics, fluid balance and neurologic status will be stable or improve  Outcome: Progressing  VS have been stable all shift     Problem: Gastrointestinal Irritability  Goal: Nausea and vomiting will be absent or improve  Outcome: Progressing  No c/o stomach upset this  Goal: Diarrhea will be absent or improved  Outcome: Progressing   The patient is Stable - Low risk of patient condition declining or worsening    Shift Goals  Clinical Goals: monitor O2 sat  Patient Goals: sleep comfortably  Family Goals: for her to get better    Progress made toward(s) clinical / shift goals:  O2 is 98 on 1L when sleeping, 95 on room air when awake    Patient is not progressing towards the following goals:

## 2023-08-11 NOTE — ASSESSMENT & PLAN NOTE
It seems patient takes oxycodone at home and the patient states she takes 30 mg 5 times a day, and she goes to a pain clinic  We have resumed her home medication

## 2023-08-11 NOTE — PROGRESS NOTES
"Hospital Medicine Daily Progress Note    Date of Service  8/11/2023    Chief Complaint  Vaishali Emery is a 68 y.o. female admitted 8/10/2023 with shortness of breath    Hospital Course  68 y.o. female past medical history of CLL, coronary artery disease status post bypass, depression, dyslipidemia, hypertension, ex-smoker who presented 8/10/2023 with generalized weakness, shortness of breath, cough, and chills.     Patient is not a very good historian.  The patient states that she started feeling generalized weakness for a \"couple of days\".  The patient describes feeling shortness of breath, increased cough, fever and chills at home.  Due to the fact that she was negative better she decided to come to the ER for further assessment and evaluation.     In the ER the patient was found to have a white blood cell count of 28.3.  CT scan concerning for pulmonary mass.  ER physician consulted with the patient's oncologist who recommended admitting the patient for IV antibiotics as this could be pneumonia.  We have consulted pulmonary for potential bronchoscopy.    Interval Problem Update  8/11: Patient seen at bedside this morning.  Overall the patient mentions she feels somewhat better today.  I discussed with pulmonary, they would like to have the patient have a PET scan before ordering a bronchoscopy.  They suspect the mass could be atelectasis.  We have ordered also a PCR respiratory panel.  Continue with antibiotics for now.  We have resumed the patient's home dose of oxycodone for chronic pain.  We will also order repeat troponin.  Patient denying chest pain this morning.    I have discussed this patient's plan of care and discharge plan at IDT rounds today with Case Management, Nursing, Nursing leadership, and other members of the IDT team.    Consultants/Specialty  pulmonary    Code Status  Full Code    Disposition  The patient is not medically cleared for discharge to home or a post-acute " facility.      I have placed the appropriate orders for post-discharge needs.    Review of Systems  Review of Systems   Constitutional:  Positive for chills and malaise/fatigue.   HENT:  Negative for hearing loss and nosebleeds.    Eyes:  Negative for blurred vision and double vision.   Respiratory:  Positive for cough and shortness of breath.    Cardiovascular:  Negative for chest pain and palpitations.   Gastrointestinal:  Negative for abdominal pain, heartburn and vomiting.   Genitourinary:  Negative for dysuria and urgency.   Musculoskeletal:  Negative for back pain and falls.   Skin:  Negative for itching and rash.   Neurological:  Negative for dizziness and headaches.   Psychiatric/Behavioral:  Negative for substance abuse. The patient is not nervous/anxious.    All other systems reviewed and are negative.       Physical Exam  Temp:  [36.4 °C (97.6 °F)-36.7 °C (98 °F)] 36.4 °C (97.6 °F)  Pulse:  [60-80] 68  Resp:  [16] 16  BP: ()/(51-70) 130/70  SpO2:  [92 %-95 %] 92 %    Physical Exam  Vitals and nursing note reviewed.   Constitutional:       General: She is not in acute distress.     Appearance: She is ill-appearing.   HENT:      Head: Normocephalic and atraumatic.      Right Ear: External ear normal.      Left Ear: External ear normal.      Mouth/Throat:      Pharynx: No oropharyngeal exudate or posterior oropharyngeal erythema.   Eyes:      General:         Right eye: No discharge.         Left eye: No discharge.   Cardiovascular:      Rate and Rhythm: Normal rate and regular rhythm.      Pulses: Normal pulses.      Heart sounds: No murmur heard.     No gallop.   Pulmonary:      Breath sounds: Rhonchi present. No wheezing.   Abdominal:      General: Bowel sounds are normal. There is no distension.      Palpations: Abdomen is soft.      Tenderness: There is no abdominal tenderness. There is no guarding.   Musculoskeletal:         General: No swelling or tenderness. Normal range of motion.       Cervical back: Normal range of motion and neck supple. No tenderness.   Skin:     General: Skin is warm and dry.   Neurological:      General: No focal deficit present.      Mental Status: She is alert and oriented to person, place, and time. Mental status is at baseline.      Motor: No weakness.   Psychiatric:         Mood and Affect: Mood normal.         Behavior: Behavior normal.         Fluids    Intake/Output Summary (Last 24 hours) at 8/11/2023 1252  Last data filed at 8/11/2023 0347  Gross per 24 hour   Intake 360 ml   Output --   Net 360 ml       Laboratory  Recent Labs     08/10/23  0639 08/11/23  0319 08/11/23  0539   WBC 28.3* 27.8*  --    RBC 3.50* 3.09*  --    HEMOGLOBIN 11.0* 9.6* 9.6*   HEMATOCRIT 35.2* 31.3* 31.6*   .6* 101.3*  --    MCH 31.4 31.1  --    MCHC 31.3* 30.7*  --    RDW 57.0* 58.0*  --    PLATELETCT 367 297  --    MPV 10.1 10.1  --      Recent Labs     08/10/23  0639 08/11/23  0319   SODIUM 140 141   POTASSIUM 5.1 4.3   CHLORIDE 105 108   CO2 24 23   GLUCOSE 106* 106*   BUN 11 9   CREATININE 0.70 0.68   CALCIUM 9.2 8.2*                   Imaging  CT-LSPINE W/O PLUS RECONS   Final Result      1.  No evidence of acute fracture of the lumbar spine.      2.  Surgical change extending from L4 through S1.      3.  Multilevel degenerative disc disease and facet degeneration.      CT-CTA CHEST PULMONARY ARTERY W/ RECONS   Final Result      1.  No evidence of pulmonary embolus.      2.  3.8 x 2.8 cm mass within the right lower lobe peripherally within the posterior sulcus. This may represent an area of round atelectasis however neoplasm cannot be excluded.      3.  Small right pleural effusion with right lung base atelectasis.      4.  Enlarged mediastinal, hilar and axillary lymph nodes.      5.  Ectasia of the ascending aorta measured at 4.2 x 4.1 cm in diameter.      DX-CHEST-PORTABLE (1 VIEW)   Final Result      No acute process.           Assessment/Plan  * Pneumonia  Assessment &  Plan  Dr Orosco was consulted by ER physician who recommended admitting patient for IV antibiotics  We will continue antibiotics and follow cultures for now.    8/11: Continue with antibiotics.  We appreciate further recommendations with pulmonary.  We have ordered respiratory PCR panel.    Chronic pain- (present on admission)  Assessment & Plan  It seems patient takes oxycodone at home and the patient states she takes 30 mg 5 times a day, and she goes to a pain clinic  We have resumed her home medication    Anemia  Assessment & Plan  No signs of bleeding  monitor    Hypomagnesemia  Assessment & Plan  Replace via IV today  monitor    Generalized weakness  Assessment & Plan  PT/OT    Leukocytosis  Assessment & Plan  Concern for pneumonia, will continue with antibiotics.  The patient also with history of CLL as well as pulmonary mass on CT scan on admission.    8/11: Only slight improvement.  Continue antibiotics, follow cultures.    Pulmonary mass  Assessment & Plan  Patient is an ex-smoker and was found to have pulmonary mass on CT scan on admission.  We have consulted pulmonary, we appreciate further recommendations.    8/11: I discussed case with pulmonary, and they are concerned that this could be atelectasis.  They would rather have the patient undergo a PET scan before doing any procedure.  They will help set up the PET scan as an outpatient.  We appreciate further recommendations.    Leukemia (HCC)  Assessment & Plan  Patient with history of CLL.  The patient follows with Dr. Orosco  She will require close follow-up as an outpatient.    HTN (hypertension)- (present on admission)  Assessment & Plan  Patient takes ramipril at home.  We have also started the patient on metoprolol.  Monitor and make changes accordingly.    Depression- (present on admission)  Assessment & Plan  Continue home duloxetine.    Dyslipidemia- (present on admission)  Assessment & Plan  Continue atorvastatin 40 mg.    CAD (coronary artery  disease)- (present on admission)  Assessment & Plan  Patient with a history of coronary bypass.  Patient denying chest pain at this time.  The patient does state that she takes aspirin and atorvastatin at home, it is unclear of the dose so we will start the patient on atorvastatin 40 and continue with baby aspirin.  The patient also takes ACE inhibitor, however it does not clear if she takes a beta-blocker.  Will start the patient on metoprolol 12.5 mg twice daily  The patient states that she will have her son bring in a complete list of medication she takes at home.         VTE prophylaxis: heparin    I have performed a physical exam and reviewed and updated ROS and Plan today (8/11/2023). In review of yesterday's note (8/10/2023), there are no changes except as documented above.      Spend at least 51 minutes providing care for this patient. This included face to face interview, physical examination. Review of labwork including CMP, Mg, CBC, Hb. Discussing with Pulmonary regarding plan of care.  Discussing with multidisciplinary team including case management, nursing staff and pharmacy.  Creating plan of care, reviewing orders.

## 2023-08-11 NOTE — PROGRESS NOTES
2015 Put patient on 1L nasal canula desats when sleeping to mid 80s. No complaints at this time.    2205-patient sleeping, O2 in place.     0005-continues to sleep, O2 95 on 1L    0210-IV not working, new IV started by Akilah. Patient tolerated well.    0405-Back to sleep O2 sat 95-97 on 1LNC    0600-Awake, alert, states pain is improving

## 2023-08-11 NOTE — THERAPY
Physical Therapy   Initial Evaluation     Patient Name: Vaishali Emery  Age:  68 y.o., Sex:  female  Medical Record #: [id number]  Today's Date: 8/11/2023          Assessment  Patient is 68 y.o. female who was recently admitted for generalized weakness, SOB, cough, and fever secondary to PNA. Pt was agreeable to therapy evaluation and was able to demonstrate near baseline functional mobility. Pt reported of slight weakness for immobility, however, pt was encouraged to mobilize at least 3x/day to prevent deconditioning. Pt was able to demonstrate SPV for ambulation and stair management. Pt is in no acute skilled PT needs at this time, anticipate pt to d/c home once medically clear.     Plan    Physical Therapy Initial Treatment Plan   Duration: (P) Evaluation only    DC Equipment Recommendations: (P) None  Discharge Recommendations: (P) Anticipate that the patient will have no further physical therapy needs after discharge from the hospital     Objective       08/11/23 0952   Initial Contact Note    Initial Contact Note Order Received and Verified, Evaluation Only - Patient Does Not Require Further Acute Physical Therapy at this Time.  However, May Benefit from Post Acute Therapy for Higher Level Functional Deficits.   Pain 0 - 10 Group   Location Back   Location Orientation Mid   Description Aching   Therapist Pain Assessment During Activity;Post Activity;Prior to Activity;Nurse Notified;5   Prior Living Situation   Housing / Facility 1 Story House   Steps Into Home 3   Steps In Home 0   Rail Both Rail (Steps into Home)   Equipment Owned None   Lives with - Patient's Self Care Capacity Adult Children;Parents   Comments pt states family can assist upon d/c to home   Prior Level of Functional Mobility   Bed Mobility Independent   Transfer Status Independent   Ambulation Independent   Ambulation Distance   (community)   Assistive Devices Used None   Stairs Independent   History of Falls   History of Falls No    Cognition    Cognition / Consciousness WDL   Passive ROM Upper Body   Passive ROM Upper Body WDL   Active ROM Upper Body   Active ROM Upper Body  WDL   Strength Upper Body   Upper Body Strength  WDL   Sensation Upper Body   Upper Extremity Sensation  WDL   Upper Body Muscle Tone   Upper Body Muscle Tone  WDL   Passive ROM Lower Body   Passive ROM Lower Body WDL   Active ROM Lower Body    Active ROM Lower Body  WDL   Strength Lower Body   Lower Body Strength  WDL   Sensation Lower Body   Lower Extremity Sensation   WDL   Lower Body Muscle Tone   Lower Body Muscle Tone  WDL   Neurological Concerns   Neurological Concerns No   Coordination Upper Body   Coordination WDL   Coordination Lower Body    Coordination Lower Body  WDL   Balance Assessment   Sitting Balance (Static) Good   Sitting Balance (Dynamic) Fair +   Standing Balance (Static) Good   Standing Balance (Dynamic) Fair +   Weight Shift Sitting Good   Weight Shift Standing Fair   Comments no AD use   Bed Mobility    Supine to Sit Supervised   Sit to Supine Supervised   Scooting Supervised   Rolling Supervised   Gait Analysis   Gait Level Of Assist Supervised   Assistive Device None   Distance (Feet) 150   # of Times Distance was Traveled 1   Deviation No deviation   # of Stairs Climbed 3   Level of Assist with Stairs Supervised   Weight Bearing Status fwb   Functional Mobility   Sit to Stand Supervised   Bed, Chair, Wheelchair Transfer Supervised   Toilet Transfers Supervised   Transfer Method Stand Step   Mobility EOB, sit<>stand, ambulation, to toilet, stairs, back to bed   How much difficulty does the patient currently have...   Turning over in bed (including adjusting bedclothes, sheets and blankets)? 4   Sitting down on and standing up from a chair with arms (e.g., wheelchair, bedside commode, etc.) 4   Moving from lying on back to sitting on the side of the bed? 4   How much help from another person does the patient currently need...   Moving to and  from a bed to a chair (including a wheelchair)? 4   Need to walk in a hospital room? 4   Climbing 3-5 steps with a railing? 4   6 clicks Mobility Score 24   Activity Tolerance   Sitting in Chair NT   Sitting Edge of Bed 5 mins   Standing 10 mins   Comments no adverse events noted   Edema / Skin Assessment   Edema / Skin  Not Assessed   Education Group   Education Provided Role of Physical Therapist   Role of Physical Therapist Patient Response Patient;Acceptance;Explanation;Demonstration;Verbal Demonstration;Action Demonstration   Physical Therapy Initial Treatment Plan    Duration Evaluation only   Anticipated Discharge Equipment and Recommendations   DC Equipment Recommendations None   Discharge Recommendations Anticipate that the patient will have no further physical therapy needs after discharge from the hospital   Interdisciplinary Plan of Care Collaboration   IDT Collaboration with  Nursing   Patient Position at End of Therapy In Bed;Call Light within Reach;Tray Table within Reach;Phone within Reach   Collaboration Comments aware of visit and recs   Session Information   Date / Session Number  8/11 eval only   Priority 0

## 2023-08-11 NOTE — DISCHARGE PLANNING
Case Management Discharge Planning    Admission Date: 8/10/2023  GMLOS: 2.9  ALOS: 1    6-Clicks ADL Score: 24  6-Clicks Mobility Score: 24      Anticipated Discharge Dispo: Discharge Disposition: Discharged to home/self care (01)    DME Needed: No    Action(s) Taken: Updated Provider/Nurse on Discharge Plan    Patient discussed during morning IDT rounds with team. Patient is not medically cleared for discharge at this time.  SWCM will remain available and continue to assist with safe disposition.     Escalations Completed: None    Medically Clear: No    Next Steps: pending medical clearance    Barriers to Discharge: Medical clearance    Is the patient up for discharge tomorrow: No

## 2023-08-11 NOTE — PROGRESS NOTES
1942 pt sleeping in bed with unlabored breathing  2140 pt sleeping woke up for a second while I took vitals then back to sleep pulse ox on  2350 pt sleeping with pulse ox on  0112 pt sleeping on her side with pulse ox on  0316 pt awake lab at bedside  0505 pt sleeping with pulse ox on

## 2023-08-12 PROBLEM — R19.7 DIARRHEA: Status: ACTIVE | Noted: 2023-08-12

## 2023-08-12 LAB
ANION GAP SERPL CALC-SCNC: 10 MMOL/L (ref 7–16)
BUN SERPL-MCNC: 7 MG/DL (ref 8–22)
CALCIUM SERPL-MCNC: 8.2 MG/DL (ref 8.4–10.2)
CHLORIDE SERPL-SCNC: 109 MMOL/L (ref 96–112)
CO2 SERPL-SCNC: 23 MMOL/L (ref 20–33)
CREAT SERPL-MCNC: 0.81 MG/DL (ref 0.5–1.4)
ERYTHROCYTE [DISTWIDTH] IN BLOOD BY AUTOMATED COUNT: 57.6 FL (ref 35.9–50)
GFR SERPLBLD CREATININE-BSD FMLA CKD-EPI: 78 ML/MIN/1.73 M 2
GLUCOSE SERPL-MCNC: 153 MG/DL (ref 65–99)
HCT VFR BLD AUTO: 30.7 % (ref 37–47)
HGB BLD-MCNC: 9.3 G/DL (ref 12–16)
MAGNESIUM SERPL-MCNC: 1.8 MG/DL (ref 1.5–2.5)
MCH RBC QN AUTO: 30.7 PG (ref 27–33)
MCHC RBC AUTO-ENTMCNC: 30.3 G/DL (ref 32.2–35.5)
MCV RBC AUTO: 101.3 FL (ref 81.4–97.8)
PLATELET # BLD AUTO: 320 K/UL (ref 164–446)
PMV BLD AUTO: 10.7 FL (ref 9–12.9)
POTASSIUM SERPL-SCNC: 3.6 MMOL/L (ref 3.6–5.5)
RBC # BLD AUTO: 3.03 M/UL (ref 4.2–5.4)
SODIUM SERPL-SCNC: 142 MMOL/L (ref 135–145)
TROPONIN T SERPL-MCNC: 19 NG/L (ref 6–19)
WBC # BLD AUTO: 30.9 K/UL (ref 4.8–10.8)

## 2023-08-12 PROCEDURE — 700111 HCHG RX REV CODE 636 W/ 250 OVERRIDE (IP): Performed by: INTERNAL MEDICINE

## 2023-08-12 PROCEDURE — 99233 SBSQ HOSP IP/OBS HIGH 50: CPT | Performed by: INTERNAL MEDICINE

## 2023-08-12 PROCEDURE — 85027 COMPLETE CBC AUTOMATED: CPT

## 2023-08-12 PROCEDURE — 83735 ASSAY OF MAGNESIUM: CPT

## 2023-08-12 PROCEDURE — 770001 HCHG ROOM/CARE - MED/SURG/GYN PRIV*

## 2023-08-12 PROCEDURE — 84484 ASSAY OF TROPONIN QUANT: CPT

## 2023-08-12 PROCEDURE — 700105 HCHG RX REV CODE 258: Performed by: INTERNAL MEDICINE

## 2023-08-12 PROCEDURE — 700102 HCHG RX REV CODE 250 W/ 637 OVERRIDE(OP): Performed by: INTERNAL MEDICINE

## 2023-08-12 PROCEDURE — 94760 N-INVAS EAR/PLS OXIMETRY 1: CPT

## 2023-08-12 PROCEDURE — 80048 BASIC METABOLIC PNL TOTAL CA: CPT

## 2023-08-12 PROCEDURE — 36415 COLL VENOUS BLD VENIPUNCTURE: CPT

## 2023-08-12 PROCEDURE — A9270 NON-COVERED ITEM OR SERVICE: HCPCS | Performed by: INTERNAL MEDICINE

## 2023-08-12 RX ORDER — OXYCODONE HYDROCHLORIDE 30 MG/1
30 TABLET ORAL
Status: DISCONTINUED | OUTPATIENT
Start: 2023-08-12 | End: 2023-08-13 | Stop reason: HOSPADM

## 2023-08-12 RX ORDER — MAGNESIUM SULFATE HEPTAHYDRATE 40 MG/ML
2 INJECTION, SOLUTION INTRAVENOUS ONCE
Status: COMPLETED | OUTPATIENT
Start: 2023-08-12 | End: 2023-08-12

## 2023-08-12 RX ORDER — OXYCODONE HYDROCHLORIDE 30 MG/1
30 TABLET ORAL
Status: DISCONTINUED | OUTPATIENT
Start: 2023-08-12 | End: 2023-08-12

## 2023-08-12 RX ADMIN — OXYCODONE HYDROCHLORIDE 30 MG: 30 TABLET ORAL at 04:55

## 2023-08-12 RX ADMIN — AMPICILLIN AND SULBACTAM 3 G: 1; 2 INJECTION, POWDER, FOR SOLUTION INTRAMUSCULAR; INTRAVENOUS at 05:09

## 2023-08-12 RX ADMIN — HEPARIN SODIUM 5000 UNITS: 5000 INJECTION, SOLUTION INTRAVENOUS; SUBCUTANEOUS at 05:04

## 2023-08-12 RX ADMIN — OXYCODONE HYDROCHLORIDE 30 MG: 30 TABLET ORAL at 14:39

## 2023-08-12 RX ADMIN — HEPARIN SODIUM 5000 UNITS: 5000 INJECTION, SOLUTION INTRAVENOUS; SUBCUTANEOUS at 22:03

## 2023-08-12 RX ADMIN — HEPARIN SODIUM 5000 UNITS: 5000 INJECTION, SOLUTION INTRAVENOUS; SUBCUTANEOUS at 14:41

## 2023-08-12 RX ADMIN — RAMIPRIL 10 MG: 2.5 CAPSULE ORAL at 05:04

## 2023-08-12 RX ADMIN — OXYCODONE HYDROCHLORIDE 30 MG: 30 TABLET ORAL at 10:10

## 2023-08-12 RX ADMIN — AMPICILLIN AND SULBACTAM 3 G: 1; 2 INJECTION, POWDER, FOR SOLUTION INTRAMUSCULAR; INTRAVENOUS at 17:48

## 2023-08-12 RX ADMIN — ASPIRIN 81 MG: 81 TABLET, COATED ORAL at 05:04

## 2023-08-12 RX ADMIN — GABAPENTIN 800 MG: 400 CAPSULE ORAL at 05:04

## 2023-08-12 RX ADMIN — DULOXETINE HYDROCHLORIDE 60 MG: 30 CAPSULE, DELAYED RELEASE ORAL at 05:04

## 2023-08-12 RX ADMIN — METOPROLOL TARTRATE 12.5 MG: 25 TABLET, FILM COATED ORAL at 17:44

## 2023-08-12 RX ADMIN — GABAPENTIN 800 MG: 400 CAPSULE ORAL at 11:45

## 2023-08-12 RX ADMIN — ATORVASTATIN CALCIUM 40 MG: 40 TABLET, FILM COATED ORAL at 18:00

## 2023-08-12 RX ADMIN — AMPICILLIN AND SULBACTAM 3 G: 1; 2 INJECTION, POWDER, FOR SOLUTION INTRAMUSCULAR; INTRAVENOUS at 11:45

## 2023-08-12 RX ADMIN — AZITHROMYCIN DIHYDRATE 500 MG: 250 TABLET ORAL at 05:04

## 2023-08-12 RX ADMIN — GABAPENTIN 800 MG: 400 CAPSULE ORAL at 17:43

## 2023-08-12 RX ADMIN — MAGNESIUM SULFATE HEPTAHYDRATE 2 G: 2 INJECTION, SOLUTION INTRAVENOUS at 14:49

## 2023-08-12 RX ADMIN — OXYCODONE HYDROCHLORIDE 30 MG: 30 TABLET ORAL at 19:52

## 2023-08-12 ASSESSMENT — ENCOUNTER SYMPTOMS
DOUBLE VISION: 0
BACK PAIN: 0
PALPITATIONS: 0
DIZZINESS: 0
VOMITING: 0
ABDOMINAL PAIN: 0
SHORTNESS OF BREATH: 1
FALLS: 0
CHILLS: 1
COUGH: 1
NERVOUS/ANXIOUS: 0
HEARTBURN: 0
HEADACHES: 0
BLURRED VISION: 0

## 2023-08-12 ASSESSMENT — PAIN DESCRIPTION - PAIN TYPE
TYPE: CHRONIC PAIN

## 2023-08-12 ASSESSMENT — PATIENT HEALTH QUESTIONNAIRE - PHQ9
SUM OF ALL RESPONSES TO PHQ9 QUESTIONS 1 AND 2: 0
1. LITTLE INTEREST OR PLEASURE IN DOING THINGS: NOT AT ALL
2. FEELING DOWN, DEPRESSED, IRRITABLE, OR HOPELESS: NOT AT ALL

## 2023-08-12 ASSESSMENT — LIFESTYLE VARIABLES: SUBSTANCE_ABUSE: 0

## 2023-08-12 NOTE — PROGRESS NOTES
"Hospital Medicine Daily Progress Note    Date of Service  8/12/2023    Chief Complaint  Vaishali Emery is a 68 y.o. female admitted 8/10/2023 with shortness of breath    Hospital Course  68 y.o. female past medical history of CLL, coronary artery disease status post bypass, depression, dyslipidemia, hypertension, ex-smoker who presented 8/10/2023 with generalized weakness, shortness of breath, cough, and chills.     Patient is not a very good historian.  The patient states that she started feeling generalized weakness for a \"couple of days\".  The patient describes feeling shortness of breath, increased cough, fever and chills at home.  Due to the fact that she was negative better she decided to come to the ER for further assessment and evaluation.     In the ER the patient was found to have a white blood cell count of 28.3.  CT scan concerning for pulmonary mass.  ER physician consulted with the patient's oncologist who recommended admitting the patient for IV antibiotics as this could be pneumonia.  We have consulted pulmonary for potential bronchoscopy.    Interval Problem Update  8/11: Patient seen at bedside this morning.  Overall the patient mentions she feels somewhat better today.  I discussed with pulmonary, they would like to have the patient have a PET scan before ordering a bronchoscopy.  They suspect the mass could be atelectasis.  We have ordered also a PCR respiratory panel.  Continue with antibiotics for now.  We have resumed the patient's home dose of oxycodone for chronic pain.  We will also order repeat troponin.  Patient denying chest pain this morning.    8/12: Patient seen at bedside this morning.  I we will consult oncology regarding the patient's white blood cell count to see if this could be due to her CLL.  Patient complaining of diarrhea.  We have ordered C. difficile.  Continue with antibiotics for now.    I have discussed this patient's plan of care and discharge plan at IDT " rounds today with Case Management, Nursing, Nursing leadership, and other members of the IDT team.    Consultants/Specialty  pulmonary    Code Status  Full Code    Disposition  The patient is not medically cleared for discharge to home or a post-acute facility.      I have placed the appropriate orders for post-discharge needs.    Review of Systems  Review of Systems   Constitutional:  Positive for chills and malaise/fatigue.   HENT:  Negative for hearing loss and nosebleeds.    Eyes:  Negative for blurred vision and double vision.   Respiratory:  Positive for cough and shortness of breath.    Cardiovascular:  Negative for chest pain and palpitations.   Gastrointestinal:  Negative for abdominal pain, heartburn and vomiting.   Genitourinary:  Negative for dysuria and urgency.   Musculoskeletal:  Negative for back pain and falls.   Skin:  Negative for itching and rash.   Neurological:  Negative for dizziness and headaches.   Psychiatric/Behavioral:  Negative for substance abuse. The patient is not nervous/anxious.    All other systems reviewed and are negative.       Physical Exam  Temp:  [36.4 °C (97.6 °F)-37 °C (98.6 °F)] 37 °C (98.6 °F)  Pulse:  [55-75] 62  Resp:  [16-18] 18  BP: (114-154)/(60-92) 114/70  SpO2:  [94 %-99 %] 94 %    Physical Exam  Vitals and nursing note reviewed.   Constitutional:       General: She is not in acute distress.     Appearance: She is ill-appearing.   HENT:      Head: Normocephalic and atraumatic.      Right Ear: External ear normal.      Left Ear: External ear normal.      Mouth/Throat:      Pharynx: No oropharyngeal exudate or posterior oropharyngeal erythema.   Eyes:      General:         Right eye: No discharge.         Left eye: No discharge.   Cardiovascular:      Rate and Rhythm: Normal rate and regular rhythm.      Pulses: Normal pulses.      Heart sounds: No murmur heard.     No gallop.   Pulmonary:      Breath sounds: Rhonchi present. No wheezing.   Abdominal:      General:  Bowel sounds are normal. There is no distension.      Palpations: Abdomen is soft.      Tenderness: There is no abdominal tenderness. There is no guarding.   Musculoskeletal:         General: No swelling or tenderness. Normal range of motion.      Cervical back: Normal range of motion and neck supple. No tenderness.   Skin:     General: Skin is warm and dry.   Neurological:      General: No focal deficit present.      Mental Status: She is alert and oriented to person, place, and time. Mental status is at baseline.      Motor: No weakness.   Psychiatric:         Mood and Affect: Mood normal.         Behavior: Behavior normal.         Fluids    Intake/Output Summary (Last 24 hours) at 8/12/2023 1253  Last data filed at 8/12/2023 0903  Gross per 24 hour   Intake 960 ml   Output --   Net 960 ml         Laboratory  Recent Labs     08/10/23  0639 08/11/23 0319 08/11/23  0539 08/12/23  0019   WBC 28.3* 27.8*  --  30.9*   RBC 3.50* 3.09*  --  3.03*   HEMOGLOBIN 11.0* 9.6* 9.6* 9.3*   HEMATOCRIT 35.2* 31.3* 31.6* 30.7*   .6* 101.3*  --  101.3*   MCH 31.4 31.1  --  30.7   MCHC 31.3* 30.7*  --  30.3*   RDW 57.0* 58.0*  --  57.6*   PLATELETCT 367 297  --  320   MPV 10.1 10.1  --  10.7       Recent Labs     08/10/23  0639 08/11/23 0319 08/12/23  0019   SODIUM 140 141 142   POTASSIUM 5.1 4.3 3.6   CHLORIDE 105 108 109   CO2 24 23 23   GLUCOSE 106* 106* 153*   BUN 11 9 7*   CREATININE 0.70 0.68 0.81   CALCIUM 9.2 8.2* 8.2*                     Imaging  CT-LSPINE W/O PLUS RECONS   Final Result      1.  No evidence of acute fracture of the lumbar spine.      2.  Surgical change extending from L4 through S1.      3.  Multilevel degenerative disc disease and facet degeneration.      CT-CTA CHEST PULMONARY ARTERY W/ RECONS   Final Result      1.  No evidence of pulmonary embolus.      2.  3.8 x 2.8 cm mass within the right lower lobe peripherally within the posterior sulcus. This may represent an area of round atelectasis  however neoplasm cannot be excluded.      3.  Small right pleural effusion with right lung base atelectasis.      4.  Enlarged mediastinal, hilar and axillary lymph nodes.      5.  Ectasia of the ascending aorta measured at 4.2 x 4.1 cm in diameter.      DX-CHEST-PORTABLE (1 VIEW)   Final Result      No acute process.             Assessment/Plan  * Pneumonia  Assessment & Plan  Dr Orosco was consulted by ER physician who recommended admitting patient for IV antibiotics  We will continue antibiotics and follow cultures for now.    8/11: Continue with antibiotics.  We appreciate further recommendations with pulmonary.  We have ordered respiratory PCR panel.    8/12: Continue antibiotics. PCR negative.    Chronic pain- (present on admission)  Assessment & Plan  It seems patient takes oxycodone at home and the patient states she takes 30 mg 5 times a day, and she goes to a pain clinic  We have resumed her home medication    Diarrhea  Assessment & Plan  It could be the antibiotics,  We have ordered C Diff rule out    Anemia  Assessment & Plan  No signs of bleeding  monitor    Hypomagnesemia  Assessment & Plan  8/12: Replace via IV today  monitor    Generalized weakness  Assessment & Plan  PT/OT    Leukocytosis  Assessment & Plan  Concern for pneumonia, will continue with antibiotics.  The patient also with history of CLL as well as pulmonary mass on CT scan on admission.    8/11: Only slight improvement.  Continue antibiotics, follow cultures.    8/12: We will consult oncology to see if the increase in WBC is due to CLL    Pulmonary mass- (present on admission)  Assessment & Plan  Patient is an ex-smoker and was found to have pulmonary mass on CT scan on admission.  We have consulted pulmonary, we appreciate further recommendations.    8/11: I discussed case with pulmonary, and they are concerned that this could be atelectasis.  They would rather have the patient undergo a PET scan before doing any procedure.  They will  help set up the PET scan as an outpatient.  We appreciate further recommendations.    Leukemia (HCC)  Assessment & Plan  Patient with history of CLL.  The patient follows with Dr. Orosco  She will require close follow-up as an outpatient.    8/12: We will consult oncology to see if the increase in WBC is due to CLL    HTN (hypertension)- (present on admission)  Assessment & Plan  Patient takes ramipril at home.  We have also started the patient on metoprolol.  Monitor and make changes accordingly.    Depression- (present on admission)  Assessment & Plan  Continue home duloxetine.    Dyslipidemia- (present on admission)  Assessment & Plan  Continue atorvastatin 40 mg.    CAD (coronary artery disease)- (present on admission)  Assessment & Plan  Patient with a history of coronary bypass.  Patient denying chest pain at this time.  The patient does state that she takes aspirin and atorvastatin at home, it is unclear of the dose so we will start the patient on atorvastatin 40 and continue with baby aspirin.  The patient also takes ACE inhibitor, however it does not clear if she takes a beta-blocker.  Will start the patient on metoprolol 12.5 mg twice daily  The patient states that she will have her son bring in a complete list of medication she takes at home.         VTE prophylaxis: heparin    I have performed a physical exam and reviewed and updated ROS and Plan today (8/12/2023). In review of yesterday's note (8/11/2023), there are no changes except as documented above.      Spend at least 52 minutes providing care for this patient. This included face to face interview, physical examination. Review of labwork including CBC, BMP, Mg. Consulting oncology.  Discussing with multidisciplinary team including case management, nursing staff and pharmacy.  Creating plan of care, reviewing orders.

## 2023-08-12 NOTE — PROGRESS NOTES
Report from TONYA Isbell. Patient currently sleeping, equal rise and fall of chest noted. Call light within reach.

## 2023-08-12 NOTE — PROGRESS NOTES
Received report from night shift RN. Pt is A&O x4, self, cardiac diet. RA, and has high BP. Precautions put in place bed in the lowest position and call light within reach.    0700: Seen pt at bedside. Pt is awake ambulating in the room by herself and pt requested water. No other needs at the moment.     0800: Pt ambulated to bathroom and back self. Pt comfortably back in bed.   0813: Stool sample sent to lab.     0900: Pt is asleep chest observed rising up/down and breathing.    1000: Pt is asleep chest observed rising up/down and breathing.  1016: CNA took vitals.    1100: Pt is awake watching TV comfortably in bed.    1200: Pt is awake eating lunch comfortably in bed.     1300: Pt is asleep chest observed rising up/down and breathing.    1400: Pt is awake talking with family at bedside.     1500: Pt is awake requesting to remove her IV to change her gown. RN notified to disconnect IV.     1600: Pt is awake talking to family at bedside.   1610: Pt is showering. Provided all items necessary for shower.   1620: CNA took vitals.     1700:Pt is awake talking to family at bedside    1800: Pt is awake eating dinner comfortably in bed.     1900: RN to night shift CNA report given.

## 2023-08-12 NOTE — PROGRESS NOTES
1900: Received report from day shift RN. Pt A&Ox4. Reviewed plan of care for the night. Pt worried about pain management and what is scheduled. Discussed options and when next medications is due. Fall precautions in place.  2040: Pt up to bathroom, had a BM.   2130: On phone with family. No needs at this time  2200: Medications given. Grandson visiting at bedside  2330: Pt awake, on phone with friends, no needs at this time  0030: Up eating a snack. Resting comfortably  0200: Pt asleep, respirations even and unlabored  0240: Up to bathroom, no needs at this time  0340: Pt called RN requesting pain meds. Reviewed orders. Informed pt next dose is at 0700, offered Baclofen. Pt wants oxy. Reached out to on call MD, Dr Bell. Dr Bell changed orders of oxy to q5h so pt could be managed over night. Pt updated  0450: VS taken. Medicated with oxycodone for pain. Pt up to bathroom, had a loose BM  0515: Routine meds given. Pt resting comfortably in bed  0600: Up to bathroom, tolerated well  0615: Pt reports feeling SOB. Oxyen 95% on RA. Placed on 2L for comfort and shortness of breath resolved

## 2023-08-12 NOTE — CONSULTS
Pulmonary Consultation    Date of consult: 8/11/2023    Referring Physician  Keshav Warren*    Reason for Consultation  Mass on CT scan     History of Presenting Illness  68 y.o. female who presented 8/10/2023 with a of CLL followed by Dr. Orosco, coronary artery disease status post bypass, depression, dyslipidemia, hypertension, ex-smoker admitted on 8/10/2023 with generalized weakness shortness of breath cough and chills.  Found to have a white count of 28.3.  CT chest showed a right lower lobe rounded lesion at the base of about 2.8 cm * 3.8  which reviewing it concerning for rounded atelectasis however of course malignancy is high on the differential. The lesion is in the post sulcus and she also has a small effusion. B/l hilar and mediastinal adenopathy  Viral panel was negative.  Blood cultures negative.  She has been afebrile here.    Code Status  Full Code    Review of Systems  Review of Systems   Constitutional:  Positive for malaise/fatigue.   HENT: Negative.     Eyes: Negative.    Respiratory: Negative.     Cardiovascular: Negative.    Gastrointestinal: Negative.    Genitourinary: Negative.    Musculoskeletal: Negative.         Pain throughout   Skin: Negative.    Neurological:  Positive for dizziness and weakness.   Endo/Heme/Allergies: Negative.    Psychiatric/Behavioral:  The patient is nervous/anxious.         She says she is withdrawing from not getting pain meds       Past Medical History   has a past medical history of Anesthesia, Angina, Angina pectoris (6/19/2009), Arthritis, Backpain (1998), Bowel habit changes, Breath shortness, CAD (coronary artery disease), Cancer ([facility]), Cervical radiculopathy (4/1/2009), Chronic pain (6/29/2011), COPD (chronic obstructive pulmonary disease) ([facility]) (4/1/2009), Dental disorder, Depression (4/1/2009), Dyslipidemia (6/29/2011), Headache(784.0) (4/1/2009), Heart burn, High cholesterol, History of anemia, History of cardiac catheterization (4/1/2009),  History of coronary artery bypass graft (4/1/2009), History of gastrointestinal bleeding (4/1/2009), History of hypertension (6/19/2009), History of mixed hyperlipidemia (6/19/2009), Lumbar radiculopathy (4/1/2009), Pneumonia (1990), PONV (postoperative nausea and vomiting), Sepsis (HCC) (2006 or 2007), Sleep apnea, Snoring, and Unspecified cataract.    Surgical History   has a past surgical history that includes other abdominal surgery (2007); other (2007); other cardiac surgery (2006); basal cell excision (2/19/2015); flap closure (2/19/2015); ritika holes; other neurological surg (2007,); hardware removal neuro (N/A, 6/23/2016); cataract extraction with iol (Bilateral, 2015); cervical disk and fusion anterior (4/28/2016); pr reconstr total shoulder implant (Left, 9/15/2020); and biceps tenodesis (Left, 9/15/2020).    Family History  family history includes Cancer in her father.    Social History   reports that she quit smoking about 17 years ago. Her smoking use included cigarettes. She has a 20.00 pack-year smoking history. She has never used smokeless tobacco. She reports that she does not drink alcohol and does not use drugs.    Medications  Home Medications       Reviewed by Bob Tatum R.N. (Registered Nurse) on 08/10/23 at 1214  Med List Status: Complete     Medication Last Dose Status   baclofen (LIORESAL) 20 MG tablet PRN Active   DULoxetine (CYMBALTA) 60 MG Cap DR Particles delayed-release capsule 8/10/2023 Active   gabapentin (NEURONTIN) 800 MG tablet 8/9/2023 Active   oxyCODONE (OXY-IR) 30 MG immediate release tablet 8/10/2023 Active   ramipril (ALTACE) 10 MG capsule 8/9/2023 Active                  Current Facility-Administered Medications   Medication Dose Route Frequency Provider Last Rate Last Admin    oxyCODONE (Oxy-IR) TABS 30 mg  30 mg Oral 5X/DAY Keshav Lopez M.D.   30 mg at 08/11/23 1802    baclofen (Lioresal) tablet 20 mg  20 mg Oral BID PRN Keshav Lopez M.D.         senna-docusate (Pericolace Or Senokot S) 8.6-50 MG per tablet 2 Tablet  2 Tablet Oral BID Keshav Lopez M.D.   2 Tablet at 08/11/23 1753    And    polyethylene glycol/lytes (Miralax) PACKET 1 Packet  1 Packet Oral QDAY PRN Keshav Lopez M.D.        And    magnesium hydroxide (Milk Of Magnesia) suspension 30 mL  30 mL Oral QDAY PRN Keshav Lopez M.D.        And    bisacodyl (Dulcolax) suppository 10 mg  10 mg Rectal QDAY PRN Keshav Lopez M.D.        heparin injection 5,000 Units  5,000 Units Subcutaneous Q8HRS Keshav Lopez M.D.   5,000 Units at 08/11/23 1501    acetaminophen (Tylenol) tablet 650 mg  650 mg Oral Q6HRS PRN Keshav Lopez M.D.        Pharmacy Consult Request ...Pain Management Review 1 Each  1 Each Other PHARMACY TO DOSE Keshav Lopez M.D.        ampicillin/sulbactam (Unasyn) 3 g in  mL IVPB  3 g Intravenous Q6HRS Keshav Lopez M.D. 200 mL/hr at 08/11/23 1757 3 g at 08/11/23 1757    azithromycin (Zithromax) tablet 500 mg  500 mg Oral DAILY Keshav Lopez M.D.   500 mg at 08/11/23 0510    DULoxetine (Cymbalta) capsule 60 mg  60 mg Oral DAILY Keshav Lopez M.D.   60 mg at 08/11/23 0510    ramipril (Altace) capsule 10 mg  10 mg Oral DAILY Keshav Lopez M.D.   10 mg at 08/11/23 0518    aspirin EC tablet 81 mg  81 mg Oral DAILY Keshav Lopez M.D.   81 mg at 08/11/23 0511    atorvastatin (Lipitor) tablet 40 mg  40 mg Oral Q EVENING Keshav Lopez M.D.   40 mg at 08/11/23 1753    metoprolol tartrate (Lopressor) tablet 12.5 mg  12.5 mg Oral TWICE DAILY Keshav Lopez M.D.   12.5 mg at 08/11/23 1753    gabapentin (Neurontin) capsule 800 mg  800 mg Oral TID Keshav Lopez M.D.   800 mg at 08/11/23 1753    ondansetron (Zofran) syringe/vial injection 4 mg  4 mg Intravenous Q8HRS PRN Keshav Lopez M.D.    4 mg at 08/10/23 1351       Allergies  Allergies   Allergen Reactions    Povidone Iodine Rash     rash       Vital Signs last 24 hours  Temp:  [36.4 °C (97.6 °F)-36.5 °C (97.7 °F)] 36.4 °C (97.6 °F)  Pulse:  [60-75] 75  Resp:  [16] 16  BP: (117-154)/(59-89) 154/83  SpO2:  [92 %-95 %] 94 %    Physical Exam  Physical Exam  Agitated that she is not getting her pain meds  Shaking and yelling  Minimal exam as pt was not cooperative  Alert and oriented * 3  CVS: tachycardic  Chest CTA    Fluids    Intake/Output Summary (Last 24 hours) at 8/11/2023 1825  Last data filed at 8/11/2023 0347  Gross per 24 hour   Intake 120 ml   Output --   Net 120 ml       Laboratory  Recent Results (from the past 48 hour(s))   CBC WITH DIFFERENTIAL    Collection Time: 08/10/23  6:39 AM   Result Value Ref Range    WBC 28.3 (H) 4.8 - 10.8 K/uL    RBC 3.50 (L) 4.20 - 5.40 M/uL    Hemoglobin 11.0 (L) 12.0 - 16.0 g/dL    Hematocrit 35.2 (L) 37.0 - 47.0 %    .6 (H) 81.4 - 97.8 fL    MCH 31.4 27.0 - 33.0 pg    MCHC 31.3 (L) 32.2 - 35.5 g/dL    RDW 57.0 (H) 35.9 - 50.0 fL    Platelet Count 367 164 - 446 K/uL    MPV 10.1 9.0 - 12.9 fL    Neutrophils-Polys 15.00 (L) 44.00 - 72.00 %    Lymphocytes 79.00 (H) 22.00 - 41.00 %    Monocytes 4.00 0.00 - 13.40 %    Eosinophils 0.00 0.00 - 6.90 %    Basophils 0.00 0.00 - 1.80 %    Nucleated RBC 0.10 0.00 - 0.20 /100 WBC    Neutrophils (Absolute) 4.81 1.82 - 7.42 K/uL    Lymphs (Absolute) 22.36 (H) 1.00 - 4.80 K/uL    Monos (Absolute) 1.13 (H) 0.00 - 0.85 K/uL    Eos (Absolute) 0.00 0.00 - 0.51 K/uL    Baso (Absolute) 0.00 0.00 - 0.12 K/uL    NRBC (Absolute) 0.04 K/uL   COMP METABOLIC PANEL    Collection Time: 08/10/23  6:39 AM   Result Value Ref Range    Sodium 140 135 - 145 mmol/L    Potassium 5.1 3.6 - 5.5 mmol/L    Chloride 105 96 - 112 mmol/L    Co2 24 20 - 33 mmol/L    Anion Gap 11.0 7.0 - 16.0    Glucose 106 (H) 65 - 99 mg/dL    Bun 11 8 - 22 mg/dL    Creatinine 0.70 0.50 - 1.40 mg/dL    Calcium  9.2 8.4 - 10.2 mg/dL    Correct Calcium 8.8 8.5 - 10.5 mg/dL    AST(SGOT) 21 12 - 45 U/L    ALT(SGPT) 14 2 - 50 U/L    Alkaline Phosphatase 96 30 - 99 U/L    Total Bilirubin 0.2 0.1 - 1.5 mg/dL    Albumin 4.5 3.2 - 4.9 g/dL    Total Protein 7.1 6.0 - 8.2 g/dL    Globulin 2.6 1.9 - 3.5 g/dL    A-G Ratio 1.7 g/dL   TROPONIN    Collection Time: 08/10/23  6:39 AM   Result Value Ref Range    Troponin T 22 (H) 6 - 19 ng/L   ESTIMATED GFR    Collection Time: 08/10/23  6:39 AM   Result Value Ref Range    GFR (CKD-EPI) 93 >60 mL/min/1.73 m 2   DIFFERENTIAL MANUAL    Collection Time: 08/10/23  6:39 AM   Result Value Ref Range    Bands-Stabs 2.00 0.00 - 10.00 %    Manual Diff Status PERFORMED    PLATELET ESTIMATE    Collection Time: 08/10/23  6:39 AM   Result Value Ref Range    Plt Estimation Normal    MORPHOLOGY    Collection Time: 08/10/23  6:39 AM   Result Value Ref Range    RBC Morphology Normal     Reactive Lymphocytes Moderate    PROCALCITONIN    Collection Time: 08/10/23  8:00 AM   Result Value Ref Range    Procalcitonin 0.05 <0.25 ng/mL   BLOOD CULTURE x2    Collection Time: 08/10/23  9:00 AM    Specimen: Peripheral; Blood   Result Value Ref Range    Significant Indicator NEG     Source BLD     Site PERIPHERAL     Culture Result       No Growth  Note: Blood cultures are incubated for 5 days and  are monitored continuously.Positive blood cultures  are called to the RN and reported as soon as  they are identified.  Blood culture testing and Gram stain, if indicated, are  performed at [facility], [address] [place] [address].  Positive blood cultures are  sent to [facility], [address] [place] [address], for organism identification and  susceptibility testing.     BLOOD CULTURE x2    Collection Time: 08/10/23  9:00 AM    Specimen: Peripheral; Blood   Result Value Ref Range    Significant Indicator NEG     Source BLD     Site PERIPHERAL     Culture Result       No  Growth  Note: Blood cultures are incubated for 5 days and  are monitored continuously.Positive blood cultures  are called to the RN and reported as soon as  they are identified.  Blood culture testing and Gram stain, if indicated, are  performed at [facility], [address] [place] [address].  Positive blood cultures are  sent to [facility], [address] [place] [address], for organism identification and  susceptibility testing.     CBC with Differential    Collection Time: 08/11/23  3:19 AM   Result Value Ref Range    WBC 27.8 (H) 4.8 - 10.8 K/uL    RBC 3.09 (L) 4.20 - 5.40 M/uL    Hemoglobin 9.6 (L) 12.0 - 16.0 g/dL    Hematocrit 31.3 (L) 37.0 - 47.0 %    .3 (H) 81.4 - 97.8 fL    MCH 31.1 27.0 - 33.0 pg    MCHC 30.7 (L) 32.2 - 35.5 g/dL    RDW 58.0 (H) 35.9 - 50.0 fL    Platelet Count 297 164 - 446 K/uL    MPV 10.1 9.0 - 12.9 fL    Neutrophils-Polys 29.00 (L) 44.00 - 72.00 %    Lymphocytes 61.00 (H) 22.00 - 41.00 %    Monocytes 5.00 0.00 - 13.40 %    Eosinophils 1.00 0.00 - 6.90 %    Basophils 0.00 0.00 - 1.80 %    Nucleated RBC 0.10 0.00 - 0.20 /100 WBC    Neutrophils (Absolute) 9.17 (H) 1.82 - 7.42 K/uL    Lymphs (Absolute) 16.96 (H) 1.00 - 4.80 K/uL    Monos (Absolute) 1.39 (H) 0.00 - 0.85 K/uL    Eos (Absolute) 0.28 0.00 - 0.51 K/uL    Baso (Absolute) 0.00 0.00 - 0.12 K/uL    NRBC (Absolute) 0.04 K/uL   Comp Metabolic Panel (CMP)    Collection Time: 08/11/23  3:19 AM   Result Value Ref Range    Sodium 141 135 - 145 mmol/L    Potassium 4.3 3.6 - 5.5 mmol/L    Chloride 108 96 - 112 mmol/L    Co2 23 20 - 33 mmol/L    Anion Gap 10.0 7.0 - 16.0    Glucose 106 (H) 65 - 99 mg/dL    Bun 9 8 - 22 mg/dL    Creatinine 0.68 0.50 - 1.40 mg/dL    Calcium 8.2 (L) 8.4 - 10.2 mg/dL    Correct Calcium 8.8 8.5 - 10.5 mg/dL    AST(SGOT) 21 12 - 45 U/L    ALT(SGPT) 9 2 - 50 U/L    Alkaline Phosphatase 74 30 - 99 U/L    Total Bilirubin 0.2 0.1 - 1.5 mg/dL    Albumin 3.2 3.2  - 4.9 g/dL    Total Protein 5.2 (L) 6.0 - 8.2 g/dL    Globulin 2.0 1.9 - 3.5 g/dL    A-G Ratio 1.6 g/dL   Magnesium    Collection Time: 08/11/23  3:19 AM   Result Value Ref Range    Magnesium 1.6 1.5 - 2.5 mg/dL   ESTIMATED GFR    Collection Time: 08/11/23  3:19 AM   Result Value Ref Range    GFR (CKD-EPI) 94 >60 mL/min/1.73 m 2   DIFFERENTIAL MANUAL    Collection Time: 08/11/23  3:19 AM   Result Value Ref Range    Bands-Stabs 4.00 0.00 - 10.00 %    Manual Diff Status PERFORMED    PLATELET ESTIMATE    Collection Time: 08/11/23  3:19 AM   Result Value Ref Range    Plt Estimation Normal    MORPHOLOGY    Collection Time: 08/11/23  3:19 AM   Result Value Ref Range    RBC Morphology Normal     Reactive Lymphocytes Moderate    HEMOGLOBIN AND HEMATOCRIT    Collection Time: 08/11/23  5:39 AM   Result Value Ref Range    Hemoglobin 9.6 (L) 12.0 - 16.0 g/dL    Hematocrit 31.6 (L) 37.0 - 47.0 %   PROCALCITONIN    Collection Time: 08/11/23  5:39 AM   Result Value Ref Range    Procalcitonin 0.06 <0.25 ng/mL   Respiratory Panel by PCR (Inpatient ONLY)    Collection Time: 08/11/23  7:42 AM    Specimen: Nasopharyngeal; Respirate   Result Value Ref Range    Adenovirus, PCR Not Detected     SARS-CoV-2 (COVID-19) RNA by QUEENIE NotDetected     Coronavirus 229E, PCR Not Detected     Coronavirus HKU1, PCR Not Detected     Coronavirus NL63, PCR Not Detected     Coronavirus OC43, PCR Not Detected     Human Metapneumovirus, PCR Not Detected     Rhino/Enterovirus, PCR Not Detected     Influenza A, PCR Not Detected     Influenza B, PCR Not Detected     Parainfluenza 1, PCR Not Detected     Parainfluenza 2, PCR Not Detected     Parainfluenza 3, PCR Not Detected     Parainfluenza 4, PCR Not Detected     RSV (Respiratory Syncytial Virus), PCR Not Detected     Bordetella parapertussis (HH2546), PCR Not Detected     Bordetella pertussis (ptxP), PCR Not Detected     Mycoplasma pneumoniae, PCR Not Detected     Chlamydia pneumoniae, PCR Not Detected         Imaging  As above    Assessment/Plan  Pulmonary mass- (present on admission)  Assessment & Plan  RLL in the post sulcus with an extending tail suggestive of rounded atelectasis  Hilar and mediastinal adenopathy  At this time would like to proceed with a PET Scan and if positive will proceed with navigational bx and ebus for LN bx  Unable to review with pt as she was very agitated due to not getting enough pain meds  D/w Dr. Fontanez  Will order the PET and follow up    Can change to po Abx and we will follow her outpt

## 2023-08-12 NOTE — CARE PLAN
"The patient is Stable - Low risk of patient condition declining or worsening    Shift Goals  Clinical Goals: Pain control with current regimen, maintain O2 >90% on RA, safety  Patient Goals: \"I need to not withdraw from pain meds\"  Family Goals: n/a    Progress made toward(s) clinical / shift goals:  Oxygen maintained above 90%, pt free from injury during the night    Patient is not progressing towards the following goals: Pain med scheduled still a problem. Changed during the night to q5h so patient doesn't have long stretch without meds      Problem: Pain - Standard  Goal: Alleviation of pain or a reduction in pain to the patient’s comfort goal  Outcome: Progressing     Problem: Knowledge Deficit - Standard  Goal: Patient and family/care givers will demonstrate understanding of plan of care, disease process/condition, diagnostic tests and medications  Outcome: Progressing     Problem: Respiratory  Goal: Patient will achieve/maintain optimum respiratory ventilation and gas exchange  Outcome: Progressing     Problem: Self Care  Goal: Patient will have the ability to perform ADLs independently or with assistance (bathe, groom, dress, toilet and feed)  Outcome: Progressing     Problem: Fall Risk  Goal: Patient will remain free from falls  Outcome: Progressing     Problem: Infection - Standard  Goal: Patient will remain free from infection  Outcome: Progressing     "

## 2023-08-12 NOTE — PROGRESS NOTES
1900= introduced myself to patient  2100= patient watching TV  2201= vitals taken  2300= patient awake, talking on phone  0100= patient sleeping with pulse ox on  0300= patient seems to be sleeping comfortably with pulse ox on  0454= vitals taken

## 2023-08-12 NOTE — CARE PLAN
"The patient is Stable - Low risk of patient condition declining or worsening    Shift Goals  Clinical Goals: control pain today, maintain at/below a 5  Patient Goals: \"Get my scheduled pain medication\"  Family Goals: n/a    Progress made toward(s) clinical / shift goals:  Pt's pain was put on a schedule, pt's pain was manageable at or below a 5 throughout the shift.     Patient is not progressing towards the following goals:      "

## 2023-08-12 NOTE — ASSESSMENT & PLAN NOTE
RLL in the post sulcus with an extending tail suggestive of rounded atelectasis  Hilar and mediastinal adenopathy  At this time would like to proceed with a PET Scan and if positive will proceed with navigational bx and ebus for LN bx  Unable to review with pt as she was very agitated due to not getting enough pain meds  D/w Dr. Fontanez  Will order the PET and follow up

## 2023-08-13 VITALS
HEIGHT: 61 IN | WEIGHT: 130.95 LBS | SYSTOLIC BLOOD PRESSURE: 113 MMHG | HEART RATE: 66 BPM | DIASTOLIC BLOOD PRESSURE: 64 MMHG | BODY MASS INDEX: 24.72 KG/M2 | TEMPERATURE: 97.6 F | RESPIRATION RATE: 18 BRPM | OXYGEN SATURATION: 91 %

## 2023-08-13 LAB
ANION GAP SERPL CALC-SCNC: 8 MMOL/L (ref 7–16)
BUN SERPL-MCNC: 9 MG/DL (ref 8–22)
CALCIUM SERPL-MCNC: 8.3 MG/DL (ref 8.4–10.2)
CHLORIDE SERPL-SCNC: 110 MMOL/L (ref 96–112)
CO2 SERPL-SCNC: 26 MMOL/L (ref 20–33)
CREAT SERPL-MCNC: 0.78 MG/DL (ref 0.5–1.4)
ERYTHROCYTE [DISTWIDTH] IN BLOOD BY AUTOMATED COUNT: 60.5 FL (ref 35.9–50)
GFR SERPLBLD CREATININE-BSD FMLA CKD-EPI: 82 ML/MIN/1.73 M 2
GLUCOSE SERPL-MCNC: 99 MG/DL (ref 65–99)
HCT VFR BLD AUTO: 31.1 % (ref 37–47)
HGB BLD-MCNC: 9.3 G/DL (ref 12–16)
MAGNESIUM SERPL-MCNC: 1.9 MG/DL (ref 1.5–2.5)
MCH RBC QN AUTO: 31.2 PG (ref 27–33)
MCHC RBC AUTO-ENTMCNC: 29.9 G/DL (ref 32.2–35.5)
MCV RBC AUTO: 104.4 FL (ref 81.4–97.8)
PLATELET # BLD AUTO: 296 K/UL (ref 164–446)
PMV BLD AUTO: 10.6 FL (ref 9–12.9)
POTASSIUM SERPL-SCNC: 4 MMOL/L (ref 3.6–5.5)
RBC # BLD AUTO: 2.98 M/UL (ref 4.2–5.4)
SODIUM SERPL-SCNC: 144 MMOL/L (ref 135–145)
WBC # BLD AUTO: 32.5 K/UL (ref 4.8–10.8)

## 2023-08-13 PROCEDURE — 83735 ASSAY OF MAGNESIUM: CPT

## 2023-08-13 PROCEDURE — 700105 HCHG RX REV CODE 258: Performed by: INTERNAL MEDICINE

## 2023-08-13 PROCEDURE — 36415 COLL VENOUS BLD VENIPUNCTURE: CPT

## 2023-08-13 PROCEDURE — 700111 HCHG RX REV CODE 636 W/ 250 OVERRIDE (IP): Mod: JZ | Performed by: INTERNAL MEDICINE

## 2023-08-13 PROCEDURE — 99239 HOSP IP/OBS DSCHRG MGMT >30: CPT | Performed by: INTERNAL MEDICINE

## 2023-08-13 PROCEDURE — 94760 N-INVAS EAR/PLS OXIMETRY 1: CPT

## 2023-08-13 PROCEDURE — 80048 BASIC METABOLIC PNL TOTAL CA: CPT

## 2023-08-13 PROCEDURE — 700102 HCHG RX REV CODE 250 W/ 637 OVERRIDE(OP): Performed by: INTERNAL MEDICINE

## 2023-08-13 PROCEDURE — 85027 COMPLETE CBC AUTOMATED: CPT

## 2023-08-13 PROCEDURE — A9270 NON-COVERED ITEM OR SERVICE: HCPCS | Performed by: INTERNAL MEDICINE

## 2023-08-13 RX ORDER — ATORVASTATIN CALCIUM 40 MG/1
40 TABLET, FILM COATED ORAL EVERY EVENING
Qty: 30 TABLET | Refills: 0 | Status: SHIPPED | OUTPATIENT
Start: 2023-08-13

## 2023-08-13 RX ORDER — AMOXICILLIN AND CLAVULANATE POTASSIUM 875; 125 MG/1; MG/1
1 TABLET, FILM COATED ORAL 2 TIMES DAILY
Qty: 8 TABLET | Refills: 0 | Status: ACTIVE | OUTPATIENT
Start: 2023-08-13 | End: 2023-08-17

## 2023-08-13 RX ORDER — ASPIRIN 81 MG/1
81 TABLET ORAL DAILY
Qty: 30 TABLET | Refills: 0 | Status: SHIPPED | OUTPATIENT
Start: 2023-08-14

## 2023-08-13 RX ORDER — MAGNESIUM SULFATE 1 G/100ML
1 INJECTION INTRAVENOUS ONCE
Status: COMPLETED | OUTPATIENT
Start: 2023-08-13 | End: 2023-08-13

## 2023-08-13 RX ADMIN — ONDANSETRON 4 MG: 2 INJECTION INTRAMUSCULAR; INTRAVENOUS at 10:56

## 2023-08-13 RX ADMIN — HEPARIN SODIUM 5000 UNITS: 5000 INJECTION, SOLUTION INTRAVENOUS; SUBCUTANEOUS at 05:15

## 2023-08-13 RX ADMIN — AMPICILLIN AND SULBACTAM 3 G: 1; 2 INJECTION, POWDER, FOR SOLUTION INTRAMUSCULAR; INTRAVENOUS at 05:22

## 2023-08-13 RX ADMIN — ONDANSETRON 4 MG: 2 INJECTION INTRAMUSCULAR; INTRAVENOUS at 01:27

## 2023-08-13 RX ADMIN — RAMIPRIL 10 MG: 2.5 CAPSULE ORAL at 05:13

## 2023-08-13 RX ADMIN — OXYCODONE HYDROCHLORIDE 30 MG: 30 TABLET ORAL at 05:14

## 2023-08-13 RX ADMIN — OXYCODONE HYDROCHLORIDE 30 MG: 30 TABLET ORAL at 10:56

## 2023-08-13 RX ADMIN — MAGNESIUM SULFATE 1 G: 1 INJECTION INTRAVENOUS at 06:36

## 2023-08-13 RX ADMIN — GABAPENTIN 800 MG: 400 CAPSULE ORAL at 05:14

## 2023-08-13 RX ADMIN — AMPICILLIN AND SULBACTAM 3 G: 1; 2 INJECTION, POWDER, FOR SOLUTION INTRAMUSCULAR; INTRAVENOUS at 00:34

## 2023-08-13 RX ADMIN — DULOXETINE HYDROCHLORIDE 60 MG: 30 CAPSULE, DELAYED RELEASE ORAL at 05:14

## 2023-08-13 RX ADMIN — ASPIRIN 81 MG: 81 TABLET, COATED ORAL at 05:14

## 2023-08-13 RX ADMIN — OXYCODONE HYDROCHLORIDE 30 MG: 30 TABLET ORAL at 00:34

## 2023-08-13 RX ADMIN — SENNOSIDES AND DOCUSATE SODIUM 2 TABLET: 50; 8.6 TABLET ORAL at 05:14

## 2023-08-13 ASSESSMENT — PATIENT HEALTH QUESTIONNAIRE - PHQ9
SUM OF ALL RESPONSES TO PHQ9 QUESTIONS 1 AND 2: 0
2. FEELING DOWN, DEPRESSED, IRRITABLE, OR HOPELESS: NOT AT ALL
1. LITTLE INTEREST OR PLEASURE IN DOING THINGS: NOT AT ALL

## 2023-08-13 ASSESSMENT — PAIN DESCRIPTION - PAIN TYPE: TYPE: CHRONIC PAIN

## 2023-08-13 NOTE — PROGRESS NOTES
Bedside Rounding     2000- gave meds to pt  2200- gave meds to pt  0000- pt sleeping R 14  0200- pt sleeping R13   0400- called on call light  0600- meds given

## 2023-08-13 NOTE — PROGRESS NOTES
Received report from night shift RN. Pt is A&O x4, self, cardiac diet, RA. Precautions put in place bed in the lowest position and call light within reach.    0700: Seen pt at bedside. Pt is ambulating to the bathroom and back. Pt states she feels like she going to have a BM.    0800: RN notified CNA pt will be discharged today. Pt is comfortably eating breakfast.    0900: Pt is comfortably in bed watching TV.     1000: Pt complains of pain RN notified. Pt is getting dressed and will be discharged soon.   1030: CNA took vitals.    1100: Pt has been discharged pt walked out.    1200:    1300:    1400:    1500:    1600:    1700:    1800:    1900:

## 2023-08-13 NOTE — PROGRESS NOTES
Report from Elsie SOLANO RN. Patient currently up to bathroom with no assistance, no current needs.

## 2023-08-13 NOTE — CARE PLAN
The patient is Stable - Low risk of patient condition declining or worsening    Shift Goals  Clinical Goals: maintain pain 3/10 during shift  Patient Goals: d/c home  Family Goals: n/a    Progress made toward(s) clinical / shift goals:  Pt's pain remained controlled, she will be discharged today.     Patient is not progressing towards the following goals:

## 2023-08-13 NOTE — CARE PLAN
"The patient is Stable - Low risk of patient condition declining or worsening    Shift Goals  Clinical Goals: Pt will state pain is a 3 out of 10 by the end of shift  Patient Goals: \"Get my scheduled pain medication\"  Family Goals: n/a    Progress made toward(s) clinical / shift goals:  Pt has had some pain throughout he night. Having the scheduled oxy has helped keep her pain tolerable. No complaints of n/v. Educated pt about call light and fall risk.  Will monitor pt.     Patient is not progressing towards the following goals:      "

## 2023-08-13 NOTE — DISCHARGE SUMMARY
"Discharge Summary    CHIEF COMPLAINT ON ADMISSION  Chief Complaint   Patient presents with    Chest Pain    Back Pain    Shortness of Breath     Pt reports sob and chest pain that radiates down left arm x the last couple of hours.  Pt reports lower back pain; hx back surgery.  Pt has leukemia stage 4; not undergoing chemo/radiation at the moment       Reason for Admission  back pain     Admission Date  8/10/2023    CODE STATUS  Full Code    HPI & HOSPITAL COURSE  68 y.o. female past medical history of CLL, coronary artery disease status post bypass, depression, dyslipidemia, hypertension, ex-smoker who presented 8/10/2023 with generalized weakness, shortness of breath, cough, and chills.     Patient is not a very good historian.  The patient states that she started feeling generalized weakness for a \"couple of days\".  The patient describes feeling shortness of breath, increased cough, fever and chills at home.  Due to the fact that she was negative better she decided to come to the ER for further assessment and evaluation.     In the ER the patient was found to have a white blood cell count of 28.3.  CT scan concerning for pulmonary mass.  ER physician consulted with the patient's oncologist who recommended admitting the patient for IV antibiotics as this could be pneumonia.  We have consulted pulmonary for potential bronchoscopy.      The patient was admitted for further management and care.  The patient continue with IV antibiotics while hospitalized.  Pulmonary was consulted and evaluated the patient.  They reviewed CT scan, they are not convinced this is a pulmonary mass and would like to do a PET scan as an outpatient.  I spoke to pulmonary and they will set up the PET scan to be done as an outpatient and follow-up with the patient as an outpatient to see if she would require bronchoscopy but not as inpatient.    I also consulted with the oncologist, regarding the patient's white blood cell count.  As per " oncology this is most likely the patient's CLL.  The patient states that she has a follow-up appointment with oncology next week.  She will require close follow-up with oncology as an outpatient.    Patient has a history of coronary artery disease.  It is unclear as to why the patient does not take aspirin, atorvastatin or beta-blocker which we have started during this hospitalization and we have placed referral to cardiology as an outpatient as it seems she has not follow-up with them in quite some time.    The patient also with a history of chronic pain.  She will require close follow-up with her pain specialist as an outpatient and resume her pain medications that she has at home.    Patient was complaining of diarrhea at some point during hospitalization, however today on the day of discharge the patient states that it has resolved.  We are pending C. difficile rule out, however since the patient is not having any more diarrhea at this point I do not suspect this is C. difficile and the patient would truly like to go home so we will discharge patient home as she does not have any further diarrhea at this time.    We will discharge her to complete antibiotic treatment for pneumonia as an outpatient.    The patient seen at bedside this morning.  Overall she feels much better and would like to go home.  Currently not complaining of any pain and she feels back to her baseline.  We will discharge patient home.  She will require close follow-up with PCP, pulmonary, cardiology and oncology as an outpatient.    Therefore, she is discharged in fair and stable condition to home with close outpatient follow-up.    The patient met 2-midnight criteria for an inpatient stay at the time of discharge.    Discharge Date  08/13/2023    FOLLOW UP ITEMS POST DISCHARGE  Patient will require close follow-up with PCP, pulmonary, cardiology and oncology as an outpatient.    DISCHARGE DIAGNOSES  Principal Problem:    Pneumonia (POA:  Unknown)  Active Problems:    Chronic pain (POA: Yes)    CAD (coronary artery disease) (POA: Yes)      Overview: 4 vessel CABG in '05    Dyslipidemia (POA: Yes)    Depression (Chronic) (POA: Yes)    HTN (hypertension) (POA: Yes)    Leukemia (HCC) (POA: Unknown)    Pulmonary mass (POA: Yes)    Leukocytosis (POA: Unknown)    Generalized weakness (POA: Unknown)    Hypomagnesemia (POA: Unknown)    Anemia (POA: Unknown)    Diarrhea (POA: Unknown)  Resolved Problems:    * No resolved hospital problems. *      FOLLOW UP    Apple Gates M.D.  [address]  [address]  [place] [address]  [phone]    Schedule an appointment as soon as possible for a visit      Mk De La O M.D.  [address]  [address]  [place] [id number] [phone]    Schedule an appointment as soon as possible for a visit      Cardiology    Schedule an appointment as soon as possible for a visit      Oncology    Schedule an appointment as soon as possible for a visit        MEDICATIONS ON DISCHARGE     Medication List        START taking these medications        Instructions   amoxicillin-clavulanate 875-125 MG Tabs  Commonly known as: Augmentin   Take 1 Tablet by mouth 2 times a day for 4 days.  Dose: 1 Tablet     aspirin 81 MG EC tablet  Start taking on: August 14, 2023   Take 1 Tablet by mouth every day.  Dose: 81 mg     atorvastatin 40 MG Tabs  Commonly known as: Lipitor   Take 1 Tablet by mouth every evening.  Dose: 40 mg     metoprolol tartrate 25 MG Tabs  Commonly known as: Lopressor   Take 0.5 Tablets by mouth 2 times a day.  Dose: 12.5 mg            CONTINUE taking these medications        Instructions   baclofen 20 MG tablet  Commonly known as: Lioresal   Take 20 mg by mouth 2 times a day as needed. Indications: Muscle Spasm  Dose: 20 mg     DULoxetine 60 MG Cpep delayed-release capsule  Commonly known as: Cymbalta   Take 60 mg by mouth every day.  Dose: 60 mg     gabapentin 800 MG tablet  Commonly known as: Neurontin   Take 800  mg by mouth 3 times a day. Per [facility], sheduled, NOT prn  Dose: 800 mg     oxyCODONE 30 MG immediate release tablet  Commonly known as: Oxy-IR   Take 30 mg by mouth 5 Times a Day.  Dose: 30 mg     ramipril 10 MG capsule  Commonly known as: Altace   Take 10 mg by mouth every day.  Dose: 10 mg              Allergies  Allergies   Allergen Reactions    Povidone Iodine Rash     rash       DIET  Orders Placed This Encounter   Procedures    Diet Order Diet: Cardiac     Standing Status:   Standing     Number of Occurrences:   1     Order Specific Question:   Diet:     Answer:   Cardiac [6]       ACTIVITY  As tolerated.  Weight bearing as tolerated    CONSULTATIONS  Pulmonary    PROCEDURES      CT-LSPINE W/O PLUS RECONS   Final Result      1.  No evidence of acute fracture of the lumbar spine.      2.  Surgical change extending from L4 through S1.      3.  Multilevel degenerative disc disease and facet degeneration.      CT-CTA CHEST PULMONARY ARTERY W/ RECONS   Final Result      1.  No evidence of pulmonary embolus.      2.  3.8 x 2.8 cm mass within the right lower lobe peripherally within the posterior sulcus. This may represent an area of round atelectasis however neoplasm cannot be excluded.      3.  Small right pleural effusion with right lung base atelectasis.      4.  Enlarged mediastinal, hilar and axillary lymph nodes.      5.  Ectasia of the ascending aorta measured at 4.2 x 4.1 cm in diameter.      DX-CHEST-PORTABLE (1 VIEW)   Final Result      No acute process.           LABORATORY  Lab Results   Component Value Date    SODIUM 144 08/13/2023    POTASSIUM 4.0 08/13/2023    CHLORIDE 110 08/13/2023    CO2 26 08/13/2023    GLUCOSE 99 08/13/2023    BUN 9 08/13/2023    CREATININE 0.78 08/13/2023    CREATININE 0.7 02/06/2009        Lab Results   Component Value Date    WBC 32.5 (H) 08/13/2023    HEMOGLOBIN 9.3 (L) 08/13/2023    HEMATOCRIT 31.1 (L) 08/13/2023    PLATELETCT 296 08/13/2023        Total time of the discharge  process exceeds 36 minutes.

## 2023-08-13 NOTE — PROGRESS NOTES
1930 pt up to restroom and back to bed  2120 pt sleeping in bed with unlabored breathing  2330 pt sitting up in bed watching tv  0130 pt awake watching tv rn at bedside  0330 pt sleeping in bed with unlabored breathing

## 2023-08-15 LAB
BACTERIA BLD CULT: NORMAL
BACTERIA BLD CULT: NORMAL
SIGNIFICANT IND 70042: NORMAL
SIGNIFICANT IND 70042: NORMAL
SITE SITE: NORMAL
SITE SITE: NORMAL
SOURCE SOURCE: NORMAL
SOURCE SOURCE: NORMAL

## 2023-08-20 ENCOUNTER — APPOINTMENT (OUTPATIENT)
Dept: RADIOLOGY | Facility: MEDICAL CENTER | Age: 68
End: 2023-08-20
Attending: EMERGENCY MEDICINE
Payer: MEDICARE

## 2023-08-20 ENCOUNTER — PHARMACY VISIT (OUTPATIENT)
Dept: PHARMACY | Facility: MEDICAL CENTER | Age: 68
End: 2023-08-20
Payer: COMMERCIAL

## 2023-08-20 ENCOUNTER — HOSPITAL ENCOUNTER (EMERGENCY)
Facility: MEDICAL CENTER | Age: 68
End: 2023-08-20
Attending: EMERGENCY MEDICINE
Payer: MEDICARE

## 2023-08-20 VITALS
HEIGHT: 61 IN | OXYGEN SATURATION: 93 % | WEIGHT: 131.39 LBS | TEMPERATURE: 97 F | HEART RATE: 69 BPM | RESPIRATION RATE: 16 BRPM | DIASTOLIC BLOOD PRESSURE: 79 MMHG | SYSTOLIC BLOOD PRESSURE: 180 MMHG | BODY MASS INDEX: 24.81 KG/M2

## 2023-08-20 DIAGNOSIS — Z91.89 AT RISK FOR MEDICATION NONADHERENCE: ICD-10-CM

## 2023-08-20 DIAGNOSIS — M79.2 NEUROPATHIC PAIN: ICD-10-CM

## 2023-08-20 DIAGNOSIS — C95.90 LEUKEMIA NOT HAVING ACHIEVED REMISSION, UNSPECIFIED LEUKEMIA TYPE (HCC): ICD-10-CM

## 2023-08-20 DIAGNOSIS — R91.8 LUNG MASS: ICD-10-CM

## 2023-08-20 LAB
ALBUMIN SERPL BCP-MCNC: 4.6 G/DL (ref 3.2–4.9)
ALBUMIN/GLOB SERPL: 2.2 G/DL
ALP SERPL-CCNC: 98 U/L (ref 30–99)
ALT SERPL-CCNC: 14 U/L (ref 2–50)
ANION GAP SERPL CALC-SCNC: 9 MMOL/L (ref 7–16)
ANISOCYTOSIS BLD QL SMEAR: ABNORMAL
AST SERPL-CCNC: 22 U/L (ref 12–45)
BASOPHILS # BLD AUTO: 0 % (ref 0–1.8)
BASOPHILS # BLD: 0 K/UL (ref 0–0.12)
BILIRUB SERPL-MCNC: 0.2 MG/DL (ref 0.1–1.5)
BUN SERPL-MCNC: 11 MG/DL (ref 8–22)
CALCIUM ALBUM COR SERPL-MCNC: 8.5 MG/DL (ref 8.5–10.5)
CALCIUM SERPL-MCNC: 9 MG/DL (ref 8.5–10.5)
CHLORIDE SERPL-SCNC: 105 MMOL/L (ref 96–112)
CO2 SERPL-SCNC: 25 MMOL/L (ref 20–33)
COMMENT NL1176: NORMAL
CREAT SERPL-MCNC: 0.83 MG/DL (ref 0.5–1.4)
EOSINOPHIL # BLD AUTO: 0 K/UL (ref 0–0.51)
EOSINOPHIL NFR BLD: 0 % (ref 0–6.9)
ERYTHROCYTE [DISTWIDTH] IN BLOOD BY AUTOMATED COUNT: 61.5 FL (ref 35.9–50)
GFR SERPLBLD CREATININE-BSD FMLA CKD-EPI: 76 ML/MIN/1.73 M 2
GLOBULIN SER CALC-MCNC: 2.1 G/DL (ref 1.9–3.5)
GLUCOSE SERPL-MCNC: 112 MG/DL (ref 65–99)
HCT VFR BLD AUTO: 36.5 % (ref 37–47)
HGB BLD-MCNC: 10.9 G/DL (ref 12–16)
LYMPHOCYTES # BLD AUTO: 25.01 K/UL (ref 1–4.8)
LYMPHOCYTES NFR BLD: 74 % (ref 22–41)
MACROCYTES BLD QL SMEAR: ABNORMAL
MANUAL DIFF BLD: NORMAL
MCH RBC QN AUTO: 30.4 PG (ref 27–33)
MCHC RBC AUTO-ENTMCNC: 29.9 G/DL (ref 32.2–35.5)
MCV RBC AUTO: 101.7 FL (ref 81.4–97.8)
MONOCYTES # BLD AUTO: 6.32 K/UL (ref 0–0.85)
MONOCYTES NFR BLD AUTO: 18.7 % (ref 0–13.4)
MORPHOLOGY BLD-IMP: NORMAL
NEUTROPHILS # BLD AUTO: 2.47 K/UL (ref 1.82–7.42)
NEUTROPHILS NFR BLD: 7.3 % (ref 44–72)
NRBC # BLD AUTO: 0.02 K/UL
NRBC BLD-RTO: 0.1 /100 WBC (ref 0–0.2)
PLATELET # BLD AUTO: 359 K/UL (ref 164–446)
PLATELET BLD QL SMEAR: NORMAL
PMV BLD AUTO: 10.4 FL (ref 9–12.9)
POTASSIUM SERPL-SCNC: 4.7 MMOL/L (ref 3.6–5.5)
PROT SERPL-MCNC: 6.7 G/DL (ref 6–8.2)
RBC # BLD AUTO: 3.59 M/UL (ref 4.2–5.4)
RBC BLD AUTO: PRESENT
SMUDGE CELLS BLD QL SMEAR: NORMAL
SODIUM SERPL-SCNC: 139 MMOL/L (ref 135–145)
WBC # BLD AUTO: 33.8 K/UL (ref 4.8–10.8)

## 2023-08-20 PROCEDURE — 96375 TX/PRO/DX INJ NEW DRUG ADDON: CPT

## 2023-08-20 PROCEDURE — A9270 NON-COVERED ITEM OR SERVICE: HCPCS | Mod: UD | Performed by: EMERGENCY MEDICINE

## 2023-08-20 PROCEDURE — 700111 HCHG RX REV CODE 636 W/ 250 OVERRIDE (IP): Mod: UD | Performed by: EMERGENCY MEDICINE

## 2023-08-20 PROCEDURE — 99284 EMERGENCY DEPT VISIT MOD MDM: CPT

## 2023-08-20 PROCEDURE — 36415 COLL VENOUS BLD VENIPUNCTURE: CPT

## 2023-08-20 PROCEDURE — RXMED WILLOW AMBULATORY MEDICATION CHARGE: Performed by: EMERGENCY MEDICINE

## 2023-08-20 PROCEDURE — 85025 COMPLETE CBC W/AUTO DIFF WBC: CPT

## 2023-08-20 PROCEDURE — 80053 COMPREHEN METABOLIC PANEL: CPT

## 2023-08-20 PROCEDURE — 96374 THER/PROPH/DIAG INJ IV PUSH: CPT

## 2023-08-20 PROCEDURE — 700102 HCHG RX REV CODE 250 W/ 637 OVERRIDE(OP): Mod: UD | Performed by: EMERGENCY MEDICINE

## 2023-08-20 PROCEDURE — 71045 X-RAY EXAM CHEST 1 VIEW: CPT

## 2023-08-20 PROCEDURE — 93005 ELECTROCARDIOGRAM TRACING: CPT | Performed by: EMERGENCY MEDICINE

## 2023-08-20 PROCEDURE — 700105 HCHG RX REV CODE 258: Mod: UD | Performed by: EMERGENCY MEDICINE

## 2023-08-20 PROCEDURE — RXOTC WILLOW AMBULATORY OTC CHARGE

## 2023-08-20 PROCEDURE — 700101 HCHG RX REV CODE 250: Mod: UD | Performed by: EMERGENCY MEDICINE

## 2023-08-20 PROCEDURE — 85007 BL SMEAR W/DIFF WBC COUNT: CPT

## 2023-08-20 PROCEDURE — 93005 ELECTROCARDIOGRAM TRACING: CPT

## 2023-08-20 RX ORDER — OXYCODONE HCL 10 MG/1
10 TABLET, FILM COATED, EXTENDED RELEASE ORAL ONCE
Status: COMPLETED | OUTPATIENT
Start: 2023-08-20 | End: 2023-08-20

## 2023-08-20 RX ORDER — FENTANYL 50 UG/1
1 PATCH TRANSDERMAL
Qty: 5 PATCH | Refills: 0 | Status: SHIPPED | OUTPATIENT
Start: 2023-08-20 | End: 2023-09-04

## 2023-08-20 RX ORDER — OXYCODONE HYDROCHLORIDE 30 MG/1
30 TABLET ORAL 3 TIMES DAILY PRN
Qty: 21 TABLET | Refills: 0 | Status: SHIPPED | OUTPATIENT
Start: 2023-08-20 | End: 2023-08-27

## 2023-08-20 RX ORDER — MIDAZOLAM HYDROCHLORIDE 1 MG/ML
1 INJECTION INTRAMUSCULAR; INTRAVENOUS ONCE
Status: COMPLETED | OUTPATIENT
Start: 2023-08-20 | End: 2023-08-20

## 2023-08-20 RX ADMIN — OXYCODONE HYDROCHLORIDE 10 MG: 10 TABLET, FILM COATED, EXTENDED RELEASE ORAL at 14:32

## 2023-08-20 RX ADMIN — KETAMINE HYDROCHLORIDE 25 MG: 100 INJECTION INTRAMUSCULAR; INTRAVENOUS at 13:44

## 2023-08-20 RX ADMIN — MIDAZOLAM HYDROCHLORIDE 1 MG: 1 INJECTION, SOLUTION INTRAMUSCULAR; INTRAVENOUS at 13:19

## 2023-08-20 ASSESSMENT — FIBROSIS 4 INDEX: FIB4 SCORE: 1.63

## 2023-08-20 ASSESSMENT — PAIN DESCRIPTION - PAIN TYPE: TYPE: ACUTE PAIN

## 2023-08-20 NOTE — DISCHARGE INSTRUCTIONS
I am starting you on fentanyl 50 mcg patch every 3 days  Reducing your oxy IR to only 3 times a day as needed  Continue Neurontin therapy and follow-up with your cancer specialist Dr. Orosco to discuss your cancer care  Follow-up with your pain management provider to discuss your pain regimen as I think you were on too much intermediate release medication

## 2023-08-20 NOTE — ED NOTES
All lines and monitors DC'd.  Discharge instructions given, questions answered.  Ambulated out of ER, escorted by RN.  Pt states all belongings in possession.  Instructed not to drive after pain medication and pt verbalizes understanding.  RX x1 given.     Son went to pharmacy to  medications.

## 2023-08-20 NOTE — ED PROVIDER NOTES
"  ER Provider Note    Scribed for Homer Brock M.d. by Chrissy Danielle. 8/20/2023  1:01 PM    Primary Care Provider: Apple Gates M.D.    CHIEF COMPLAINT  Chief Complaint   Patient presents with    Shortness of Breath     Started a couple weeks ago. Pt saw PCP a couple days ago and was told she needs oxygen-oxygen was ordered but she hasn't received it yet. Pt has leukemia stage 4 and has a sachi her lungs and hasn't had biopsy done yet.      EXTERNAL RECORDS REVIEWED  Inpatient Notes last admission to the hospital was reviewed in August 10 she was treated for pneumonia    HPI/ROS  LIMITATION TO HISTORY   Select: : None  OUTSIDE HISTORIAN(S):  None    Vaishali Emery is a 68 y.o. with Stage 4 leukemia female who presents to the ED complaining of shortness of breath onset a couple weeks ago. She explained that her pain and shortness of breath was \"out of control\" which prompted her to present to the ED. The patient says her pain is burning, sharp, stabbing which is located in her lower back. A CT scan of her lungs showed that she had pneumonia as well as a mass in her lungs. The mass has not yet been biopsied and she has not had a PET scan yet. The patient explained that she ran out of her medicine and therefore has not taken it recently. She is currently followed pain management.       PAST MEDICAL HISTORY  Past Medical History:   Diagnosis Date    Anesthesia     \"difficulty breathing\"    Angina     none currently     Angina pectoris 6/19/2009    Arthritis     Backpain 1998    Bowel habit changes     constipation    Breath shortness     hx of, not currently    CAD (coronary artery disease)     Cancer ([facility])     skin    Cervical radiculopathy 4/1/2009    Chronic pain 6/29/2011    COPD (chronic obstructive pulmonary disease) ([facility]) 4/1/2009    Dental disorder     upper/lower dentures    Depression 4/1/2009    Dyslipidemia 6/29/2011    Headache(784.0) 4/1/2009    Heart burn     High cholesterol     History of " anemia     History of cardiac catheterization 4/1/2009    History of coronary artery bypass graft 4/1/2009    History of gastrointestinal bleeding 4/1/2009    History of hypertension 6/19/2009    History of mixed hyperlipidemia 6/19/2009    Lumbar radiculopathy 4/1/2009    Pneumonia 1990    approximate date    PONV (postoperative nausea and vomiting)     Sepsis (HCC) 2006 or 2007    Sleep apnea     before gastric bypass, no CPAP now    Snoring     Unspecified cataract     IOLS       SURGICAL HISTORY  Past Surgical History:   Procedure Laterality Date    PB RECONSTR TOTAL SHOULDER IMPLANT Left 9/15/2020    Procedure: ARTHROPLASTY, SHOULDER, TOTAL;  Surgeon: Juventino Greene M.D.;  Location: SURGERY [facility];  Service: Orthopedics    BICEPS TENODESIS Left 9/15/2020    Procedure: TENODESIS, BICEPS - AND ADDISON;  Surgeon: Juventino Greene M.D.;  Location: SURGERY [facility];  Service: Orthopedics    HARDWARE REMOVAL NEURO N/A 6/23/2016    Procedure: HARDWARE REMOVAL NEURO L3-S1;  Surgeon: Aaron Martines M.D.;  Location: SURGERY [facility];  Service:     CERVICAL DISK AND FUSION ANTERIOR  4/28/2016    Procedure: CERVICAL DISK AND FUSION ANTERIOR Cervical4-5 WITH PEEK INTERBODY;  Surgeon: Aaron Martines M.D.;  Location: SURGERY [facility];  Service:     BASAL CELL EXCISION  2/19/2015    Performed by Adán Blake Jr., M.D. at SURGERY [facility]    FLAP CLOSURE  2/19/2015    Performed by Adán Blake Jr., M.D. at SURGERY [facility]    CATARACT EXTRACTION WITH IOL Bilateral 2015    OTHER ABDOMINAL SURGERY  2007    gastric bypass    OTHER  2007    neck surgery    OTHER NEUROLOGICAL SURG  2007,    6 back surgeries (at ages 13, 18, 20, 37, and 52)    OTHER CARDIAC SURGERY  2006    bypass x 4 vessels    ARLEN HOLES         FAMILY HISTORY  Family History   Problem Relation Age of Onset    Cancer Father         brain       SOCIAL HISTORY   reports that she quit smoking about 17 years ago. Her smoking use  "included cigarettes. She has a 20.00 pack-year smoking history. She has never used smokeless tobacco. She reports that she does not drink alcohol and does not use drugs.    CURRENT MEDICATIONS  Previous Medications    ASPIRIN 81 MG EC TABLET    Take 1 Tablet by mouth every day.    ATORVASTATIN (LIPITOR) 40 MG TAB    Take 1 Tablet by mouth every evening.    BACLOFEN (LIORESAL) 20 MG TABLET    Take 20 mg by mouth 2 times a day as needed. Indications: Muscle Spasm    DULOXETINE (CYMBALTA) 60 MG CAP DR PARTICLES DELAYED-RELEASE CAPSULE    Take 60 mg by mouth every day.    GABAPENTIN (NEURONTIN) 800 MG TABLET    Take 800 mg by mouth 3 times a day. Per [facility], sheduled, NOT prn    METOPROLOL TARTRATE (LOPRESSOR) 25 MG TAB    Take 0.5 Tablets by mouth 2 times a day.    OXYCODONE (OXY-IR) 30 MG IMMEDIATE RELEASE TABLET    Take 30 mg by mouth 5 Times a Day.    RAMIPRIL (ALTACE) 10 MG CAPSULE    Take 10 mg by mouth every day.       ALLERGIES  Povidone iodine    PHYSICAL EXAM  BP (!) 187/99   Pulse 93   Temp 36.4 °C (97.5 °F) (Temporal)   Resp 18   Ht 1.549 m (5' 1\")   Wt 59.6 kg (131 lb 6.3 oz)   SpO2 96%   BMI 24.83 kg/m²     Constitutional: Well developed, Well nourished, No acute distress, Non-toxic appearance.   HENT: Normocephalic, Atraumatic, Bilateral external ears normal, Oropharynx is clear mucous membranes are moist. No oral exudates or nasal discharge.   Eyes: Pupils are equal round and reactive, EOMI, Conjunctiva normal, No discharge.   Neck: Normal range of motion, No tenderness, Supple, No stridor. No meningismus.  Lymphatic: No lymphadenopathy noted.   Cardiovascular: Regular rate and rhythm without murmur rub or gallop.  Thorax & Lungs: Clear breath sounds bilaterally without wheezes, rhonchi or rales. There is no chest wall tenderness.   Abdomen: Soft non-tender non-distended. There is no rebound or guarding. No organomegaly is appreciated. Bowel sounds are normal.  Skin: Normal without rash.   Back: No " CVA or spinal tenderness.   Extremities: Intact distal pulses, 1+ pitting edema bilaterally, No tenderness, No cyanosis, No clubbing. Capillary refill is less than 2 seconds.  Musculoskeletal: Good range of motion in all major joints. No tenderness to palpation or major deformities noted.   Neurologic: Alert & oriented x 3, Normal motor function, Normal sensory function, No focal deficits noted. Reflexes are normal.  Psychiatric: Affect normal, Judgment normal, Mood normal. There is no suicidal ideation or patient reported hallucinations.      DIAGNOSTIC STUDIES    Labs:   Labs Reviewed   CBC WITH DIFFERENTIAL - Abnormal; Notable for the following components:       Result Value    WBC 33.8 (*)     RBC 3.59 (*)     Hemoglobin 10.9 (*)     Hematocrit 36.5 (*)     .7 (*)     MCHC 29.9 (*)     RDW 61.5 (*)     Neutrophils-Polys 7.30 (*)     Lymphocytes 74.00 (*)     Monocytes 18.70 (*)     Lymphs (Absolute) 25.01 (*)     Monos (Absolute) 6.32 (*)     Anisocytosis 2+ (*)     Macrocytosis 2+ (*)     All other components within normal limits   COMP METABOLIC PANEL - Abnormal; Notable for the following components:    Glucose 112 (*)     All other components within normal limits   ESTIMATED GFR   DIFFERENTIAL MANUAL   PERIPHERAL SMEAR REVIEW   PLATELET ESTIMATE   MORPHOLOGY        EKG:   I have independently interpreted this EKG  Results for orders placed or performed during the hospital encounter of 23   EKG (NOW)   Result Value Ref Range    Report       [facility] Emergency Dept.    Test Date:  2023  Pt Name:    VINNY MCDANIELS           Department: ER  MRN:        [id number]                      Room:  Gender:     Female                       Technician: [id number]  :        1955                   Requested By:ER TRIAGE PROTOCOL  Order #:    [id number]                    Reading MD:    Measurements  Intervals                                Axis  Rate:       82                            P:          64  MI:         137                          QRS:        11  QRSD:       96                           T:          95  QT:         401  QTc:        469    Interpretive Statements  Sinus arrhythmia  Ventricular premature complex  Nonspecific T abnormalities, lateral leads  Compared to ECG 09/23/2020 19:33:12  Ventricular premature complex(es) now present  T-wave abnormality now present  Sinus rhythm no longer present           Radiology:   The attending emergency physician has independently interpreted the diagnostic imaging associated with this visit and am waiting the final reading from the radiologist.   Preliminary interpretation is a follows: No evidence of pneumonia or other airspace disease    Radiologist interpretation:   DX-CHEST-PORTABLE (1 VIEW)   Final Result      No acute cardiopulmonary abnormality.           COURSE & MEDICAL DECISION MAKING     ED Observation Status? Yes; I am placing the patient in to an observation status due to a diagnostic uncertainty as well as therapeutic intensity. Patient placed in observation status at 1:13 PM, 8/20/2023.     Observation plan is as follows: Monitor for symptom management and diagnostic results     Upon Reevaluation, the patient's condition has: Improved; and will be discharged.    Patient discharged from ED Observation status at 3:15 PM (Time) August 20, 2023 (Date).     INITIAL ASSESSMENT, COURSE AND PLAN  Care Narrative:   1:02 PM - Patient seen and examined at bedside. Discussed plan of care, including a diagnostic work up as well as treatment for pain relief. Patient agrees to the plan of care. The patient will be medicated with Ketalar 25 mg in NS 50 mL and Versed 1 mg injection.    1:35 PM- Ordered OxyCONTIN 10 mg tablet.  Between the ketamine and the patient's oxy Contin she had substantial pain relief and was quite comfortable on reassessment    Laboratory evaluation consistent with her history of leukemia given a white blood cell count  over 33,000 with predominantly lymphocytes at 74% indicating that she likely has CLL    Although chest x-ray is unremarkable today I reviewed her CT scan of the chest recently that shows a 3.8 x 2.8 cm mass within the right lower lobe peripherally within the posterior sulcus and neoplasm cannot be excluded.  This will need to ultimately be biopsied in sequence to determine next best steps by oncology    2:22 PM - Patient was reevaluated at bedside.  Patient is very comfortable still and says that she feels better than she has felt in quite a long time and appreciates her care    I discussed her overall pain management regimen.  I think she is on too much intermediate release medication and would benefit from longer acting such as fentanyl patch as baseline and reducing her oxy IR to 3 times a day as needed she is amenable to this plan    ADDITIONAL PROBLEM LIST  Chest mass.  Needs biopsy at some point to determine next best therapy and possibly oncogenetics    DISPOSITION AND DISCUSSIONS  I have discussed management of the patient with the following physicians and OC's:  None    Discussion of management with other QHP or appropriate source(s): Pharmacy        Decision tools and prescription drugs considered including, but not limited to: Pain Medications I reviewed the  and her total pain regimen which I believe has too much intermediate release medication and not enough extended release .    I reviewed prescription monitoring program for patient's narcotic use before prescribing a scheduled drug.The patient will not drink alcohol nor drive with prescribed medications. The patient will return for new or worsening symptoms and is stable at the time of discharge.    The patient is referred to a primary physician for blood pressure management, diabetic screening, and for all other preventative health concerns.  In prescribing controlled substances to this patient, I certify that I have obtained and reviewed the  medical history of Vaishali Emery. I have also made a good aly effort to obtain applicable records from other providers who have treated the patient and records did not demonstrate any increased risk of substance abuse that would prevent me from prescribing controlled substances.     I have conducted a physical exam and documented it. I have reviewed Ms. Sergio Emery’s prescription history as maintained by the [place] Prescription Monitoring Program.     I have assessed the patient’s risk for abuse, dependency, and addiction using the validated Opioid Risk Tool available at https://www.mdcalc.com/hubvab-atog-gpxc-ort-narcotic-abuse.     Given the above, I believe the benefits of controlled substance therapy outweigh the risks. The reasons for prescribing controlled substances include non-narcotic, oral analgesic alternatives have been inadequate for pain control. Accordingly, I have discussed the risk and benefits, treatment plan, and alternative therapies with the patient.      DISPOSITION:  Patient will be discharged home in stable condition.    FOLLOW UP:  No follow-up provider specified.    OUTPATIENT MEDICATIONS:  New Prescriptions    No medications on file      FINAL DIAGNOSIS  1. Neuropathic pain    2. Leukemia not having achieved remission, unspecified leukemia type (HCC)    3. Lung mass    4. At risk for medication nonadherence         Chrissy MERINO (Marcia), am scribing for, and in the presence of, Homer Brock M.D..    Electronically signed by: Chrissy Danielle (Marcia), 8/20/2023    Homer MERINO M.D. personally performed the services described in this documentation, as scribed by Chrissy Danielle in my presence, and it is both accurate and complete.      The note accurately reflects work and decisions made by me.  Homer Brock M.D.  8/20/2023  3:09 PM

## 2023-08-21 ENCOUNTER — APPOINTMENT (OUTPATIENT)
Dept: RADIOLOGY | Facility: MEDICAL CENTER | Age: 68
End: 2023-08-21
Attending: EMERGENCY MEDICINE
Payer: MEDICARE

## 2023-08-21 ENCOUNTER — HOSPITAL ENCOUNTER (EMERGENCY)
Facility: MEDICAL CENTER | Age: 68
End: 2023-08-21
Attending: EMERGENCY MEDICINE
Payer: MEDICARE

## 2023-08-21 VITALS
RESPIRATION RATE: 20 BRPM | BODY MASS INDEX: 24.35 KG/M2 | HEIGHT: 61 IN | TEMPERATURE: 98.6 F | WEIGHT: 128.97 LBS | DIASTOLIC BLOOD PRESSURE: 89 MMHG | OXYGEN SATURATION: 93 % | SYSTOLIC BLOOD PRESSURE: 172 MMHG | HEART RATE: 65 BPM

## 2023-08-21 DIAGNOSIS — R06.00 DYSPNEA, UNSPECIFIED TYPE: ICD-10-CM

## 2023-08-21 DIAGNOSIS — J44.9 CHRONIC OBSTRUCTIVE PULMONARY DISEASE, UNSPECIFIED COPD TYPE (HCC): ICD-10-CM

## 2023-08-21 DIAGNOSIS — R91.8 LUNG MASS: ICD-10-CM

## 2023-08-21 DIAGNOSIS — C91.10 CLL (CHRONIC LYMPHOCYTIC LEUKEMIA) (HCC): ICD-10-CM

## 2023-08-21 LAB
ALBUMIN SERPL BCP-MCNC: 4.4 G/DL (ref 3.2–4.9)
ALBUMIN/GLOB SERPL: 2.3 G/DL
ALP SERPL-CCNC: 87 U/L (ref 30–99)
ALT SERPL-CCNC: 11 U/L (ref 2–50)
ANION GAP SERPL CALC-SCNC: 10 MMOL/L (ref 7–16)
ANISOCYTOSIS BLD QL SMEAR: ABNORMAL
AST SERPL-CCNC: 22 U/L (ref 12–45)
BASOPHILS # BLD AUTO: 0 % (ref 0–1.8)
BASOPHILS # BLD: 0 K/UL (ref 0–0.12)
BILIRUB SERPL-MCNC: 0.2 MG/DL (ref 0.1–1.5)
BUN SERPL-MCNC: 11 MG/DL (ref 8–22)
CALCIUM ALBUM COR SERPL-MCNC: 8.7 MG/DL (ref 8.5–10.5)
CALCIUM SERPL-MCNC: 9 MG/DL (ref 8.4–10.2)
CHLORIDE SERPL-SCNC: 107 MMOL/L (ref 96–112)
CO2 SERPL-SCNC: 25 MMOL/L (ref 20–33)
CREAT SERPL-MCNC: 0.81 MG/DL (ref 0.5–1.4)
EKG IMPRESSION: NORMAL
EKG IMPRESSION: NORMAL
EOSINOPHIL # BLD AUTO: 0 K/UL (ref 0–0.51)
EOSINOPHIL NFR BLD: 0 % (ref 0–6.9)
ERYTHROCYTE [DISTWIDTH] IN BLOOD BY AUTOMATED COUNT: 62.4 FL (ref 35.9–50)
FLUAV RNA SPEC QL NAA+PROBE: NEGATIVE
FLUBV RNA SPEC QL NAA+PROBE: NEGATIVE
GFR SERPLBLD CREATININE-BSD FMLA CKD-EPI: 78 ML/MIN/1.73 M 2
GLOBULIN SER CALC-MCNC: 1.9 G/DL (ref 1.9–3.5)
GLUCOSE SERPL-MCNC: 94 MG/DL (ref 65–99)
HCT VFR BLD AUTO: 34.6 % (ref 37–47)
HGB BLD-MCNC: 10.6 G/DL (ref 12–16)
LYMPHOCYTES # BLD AUTO: 27.1 K/UL (ref 1–4.8)
LYMPHOCYTES NFR BLD: 88 % (ref 22–41)
MACROCYTES BLD QL SMEAR: ABNORMAL
MANUAL DIFF BLD: NORMAL
MCH RBC QN AUTO: 31.7 PG (ref 27–33)
MCHC RBC AUTO-ENTMCNC: 30.6 G/DL (ref 32.2–35.5)
MCV RBC AUTO: 103.6 FL (ref 81.4–97.8)
MONOCYTES # BLD AUTO: 0 K/UL (ref 0–0.85)
MONOCYTES NFR BLD AUTO: 0 % (ref 0–13.4)
NEUTROPHILS # BLD AUTO: 3.7 K/UL (ref 1.82–7.42)
NEUTROPHILS NFR BLD: 12 % (ref 44–72)
NRBC # BLD AUTO: 0 K/UL
NRBC BLD-RTO: 0 /100 WBC (ref 0–0.2)
NT-PROBNP SERPL IA-MCNC: 903 PG/ML (ref 0–125)
PLATELET # BLD AUTO: 324 K/UL (ref 164–446)
PLATELET BLD QL SMEAR: NORMAL
PMV BLD AUTO: 10 FL (ref 9–12.9)
POTASSIUM SERPL-SCNC: 4.5 MMOL/L (ref 3.6–5.5)
PROT SERPL-MCNC: 6.3 G/DL (ref 6–8.2)
RBC # BLD AUTO: 3.34 M/UL (ref 4.2–5.4)
RBC BLD AUTO: PRESENT
RSV RNA SPEC QL NAA+PROBE: NEGATIVE
SARS-COV-2 RNA RESP QL NAA+PROBE: NOTDETECTED
SODIUM SERPL-SCNC: 142 MMOL/L (ref 135–145)
SPECIMEN SOURCE: NORMAL
TROPONIN T SERPL-MCNC: 20 NG/L (ref 6–19)
TROPONIN T SERPL-MCNC: 48 NG/L (ref 6–19)
VARIANT LYMPHS BLD QL SMEAR: NORMAL
WBC # BLD AUTO: 30.8 K/UL (ref 4.8–10.8)

## 2023-08-21 PROCEDURE — 0241U HCHG SARS-COV-2 COVID-19 NFCT DS RESP RNA 4 TRGT MIC: CPT

## 2023-08-21 PROCEDURE — 80053 COMPREHEN METABOLIC PANEL: CPT

## 2023-08-21 PROCEDURE — 99284 EMERGENCY DEPT VISIT MOD MDM: CPT

## 2023-08-21 PROCEDURE — 80503 PATH CLIN CONSLTJ SF 5-20: CPT

## 2023-08-21 PROCEDURE — 71045 X-RAY EXAM CHEST 1 VIEW: CPT

## 2023-08-21 PROCEDURE — 93005 ELECTROCARDIOGRAM TRACING: CPT | Performed by: EMERGENCY MEDICINE

## 2023-08-21 PROCEDURE — C9803 HOPD COVID-19 SPEC COLLECT: HCPCS | Performed by: EMERGENCY MEDICINE

## 2023-08-21 PROCEDURE — 85007 BL SMEAR W/DIFF WBC COUNT: CPT

## 2023-08-21 PROCEDURE — 84484 ASSAY OF TROPONIN QUANT: CPT

## 2023-08-21 PROCEDURE — 83880 ASSAY OF NATRIURETIC PEPTIDE: CPT

## 2023-08-21 PROCEDURE — 85025 COMPLETE CBC W/AUTO DIFF WBC: CPT

## 2023-08-21 PROCEDURE — 36415 COLL VENOUS BLD VENIPUNCTURE: CPT

## 2023-08-21 RX ORDER — RAMIPRIL 2.5 MG/1
10 CAPSULE ORAL ONCE
Status: DISCONTINUED | OUTPATIENT
Start: 2023-08-21 | End: 2023-08-21 | Stop reason: HOSPADM

## 2023-08-21 ASSESSMENT — FIBROSIS 4 INDEX: FIB4 SCORE: 1.13

## 2023-08-21 ASSESSMENT — HEART SCORE
HISTORY: SLIGHTLY SUSPICIOUS
RISK FACTORS: >2 RISK FACTORS OR HX OF ATHEROSCLEROTIC DISEASE
AGE: 65+
TROPONIN: LESS THAN OR EQUAL TO NORMAL LIMIT
HEART SCORE: 5
ECG: NON-SPECIFIC REPOLARIZATION DISTURBANCE

## 2023-08-21 NOTE — ED PROVIDER NOTES
ED Provider Note    CHIEF COMPLAINT  Chief Complaint   Patient presents with    Shortness of Breath     Has been SOB for the last 5 days    has O2 ordered however has not received it as of yet   here due to continued SOB w/ all activity        EXTERNAL RECORDS REVIEWED  Inpatient Notes the patient was admitted to the hospital 8/10/2023 and discharged 8/13/2023 for chest pain back pain and shortness of breath.  He has a history of stage IV leukemia but is not undergoing any chemo or radiation at the moment the patient was found to have a pulmonary mass and was admitted for treatment for pneumonia.  His white count was 28.  Oncology was consulted.  Patient was discharged home on antibiotics to treat for pneumonia she was discharged with a cardiology pulmonary and oncology outpatient follow-up  CTA pulm artery 8/10/23  IMPRESSION:     1.  No evidence of pulmonary embolus.     2.  3.8 x 2.8 cm mass within the right lower lobe peripherally within the posterior sulcus. This may represent an area of round atelectasis however neoplasm cannot be excluded.     3.  Small right pleural effusion with right lung base atelectasis.     4.  Enlarged mediastinal, hilar and axillary lymph nodes.     5.  Ectasia of the ascending aorta measured at 4.2 x 4.1 cm in diameter.    HPI/ROS  LIMITATION TO HISTORY   Select: : None  OUTSIDE HISTORIAN(S):  none    Vaishali Emery is a 68 y.o. female who presents planing of shortness of breath worse with laying flat.  She has been short of breath ever since she was discharged from the hospital on the 13th of this month.  She was actually seen yesterday at [facility] ER for the same complaint along with chronic pain and was discharged home she did not qualify her for oxygen at that time.  The patient states that her primary care doctor did testing and she did qualify for oxygen but has not received it yet at home.  She states that she has to sleep sitting up because it is too much  for her to lay down she cannot catch her breath.  She has noticed increased swelling in her legs.  She is on chronic pain medication and states she has not had a bowel movement in over a week.  She is not vomiting.  She denies any fevers or chills.  She denies any productive cough.  She has no pain in her chest.  The patient does have a recent diagnosis of a lung mass in her right lower lobe and is supposed to get a PET scan.  She also has CLL coronary artery disease and COPD.  Patient states that she quit smoking a week ago.    PAST MEDICAL HISTORY   has a past medical history of Anesthesia, Angina, Angina pectoris (6/19/2009), Arthritis, Backpain (1998), Bowel habit changes, Breath shortness, CAD (coronary artery disease), Cancer ([facility]), Cervical radiculopathy (4/1/2009), Chronic pain (6/29/2011), COPD (chronic obstructive pulmonary disease) ([facility]) (4/1/2009), Dental disorder, Depression (4/1/2009), Dyslipidemia (6/29/2011), Headache(784.0) (4/1/2009), Heart burn, High cholesterol, History of anemia, History of cardiac catheterization (4/1/2009), History of coronary artery bypass graft (4/1/2009), History of gastrointestinal bleeding (4/1/2009), History of hypertension (6/19/2009), History of mixed hyperlipidemia (6/19/2009), Lumbar radiculopathy (4/1/2009), Pneumonia (1990), PONV (postoperative nausea and vomiting), Sepsis ([facility]) (2006 or 2007), Sleep apnea, Snoring, and Unspecified cataract.    SURGICAL HISTORY   has a past surgical history that includes other abdominal surgery (2007); other (2007); other cardiac surgery (2006); basal cell excision (2/19/2015); flap closure (2/19/2015); ritika holes; other neurological surg (2007,); hardware removal neuro (N/A, 6/23/2016); cataract extraction with iol (Bilateral, 2015); cervical disk and fusion anterior (4/28/2016); reconstr total shoulder implant (Left, 9/15/2020); and biceps tenodesis (Left, 9/15/2020).    FAMILY HISTORY  Family History   Problem Relation Age of  "Onset    Cancer Father         brain       SOCIAL HISTORY  Social History     Tobacco Use    Smoking status: Former     Packs/day: 0.50     Years: 40.00     Additional pack years: 0.00     Total pack years: 20.00     Types: Cigarettes     Quit date: 2006     Years since quittin.5    Smokeless tobacco: Never   Vaping Use    Vaping Use: Every day    Substances: Nicotine    Devices: Pre-filled or refillable cartridge   Substance and Sexual Activity    Alcohol use: No    Drug use: No    Sexual activity: Not on file       CURRENT MEDICATIONS  Home Medications       Reviewed by Vinnie Del Rio (Pharmacy Tech) on 23 at 1245  Med List Status: Complete     Medication Last Dose Status   aspirin 81 MG EC tablet 2023 Active   atorvastatin (LIPITOR) 40 MG Tab 2023 Active   baclofen (LIORESAL) 20 MG tablet > 1 week Active   Cholecalciferol (D3 PO) 2023 Active   DULoxetine (CYMBALTA) 60 MG Cap DR Particles delayed-release capsule 2023 Active   fentaNYL (DURAGESIC) 50 MCG/HR PATCH 72 HR 2023 Active   Ferrous Sulfate (IRON PO) 2023 Active   gabapentin (NEURONTIN) 800 MG tablet 2023 Active   metoprolol tartrate (LOPRESSOR) 25 MG Tab 2023 Active   multivitamin Tab 2023 Active   Omega-3 Fatty Acids (FISH OIL PO) 2023 Active   oxyCODONE (OXY-IR) 30 MG immediate release tablet 2023 Active   ramipril (ALTACE) 10 MG capsule 2023 Active                    ALLERGIES  Allergies   Allergen Reactions    Povidone Iodine Rash     rash       PHYSICAL EXAM  VITAL SIGNS: BP (!) 171/94   Pulse 74   Temp 37 °C (98.6 °F) (Temporal)   Resp 16   Ht 1.549 m (5' 1\")   Wt 58.5 kg (128 lb 15.5 oz)   SpO2 96%   BMI 24.37 kg/m²      Constitutional: Well developed, Well nourished, No acute distress, Non-toxic appearance.   HEENT: Normocephalic, Atraumatic,  external ears normal, pharynx pink,  Mucous  Membranes moist, No rhinorrhea or mucosal edema  Eyes: PERRL, EOMI, " Conjunctiva normal, No discharge.   Neck: Normal range of motion, No tenderness, Supple, No stridor.   Lymphatic: No lymphadenopathy    Cardiovascular: Regular Rate and Rhythm, No murmurs,  rubs, or gallops.   Thorax & Lungs: Lungs clear to auscultation bilaterally, No respiratory distress, No wheezes, rhales or rhonchi, No chest wall tenderness.   Abdomen: Bowel sounds normal, Soft, non tender, non distended,  No pulsatile masses., no rebound guarding or peritoneal signs.   Skin: Warm, Dry, No erythema, No rash,   Back:  No CVA tenderness,  No spinal tenderness, bony crepitance step offs or instability.   Extremities: Equal, intact distal pulses, No cyanosis, clubbing trace edema bilateral lower extremities,  No tenderness.   Musculoskeletal: Good range of motion in all major joints. No tenderness to palpation or major deformities noted.   Neurologic: Alert & oriented ,  No focal deficits noted.   Psychiatric: Affect normal, Judgment normal, Mood normal      DIAGNOSTIC STUDIES / PROCEDURES  EKG  I have independently interpreted this EKG  See below    LABS  Results for orders placed or performed during the hospital encounter of 08/21/23   CBC with Differential   Result Value Ref Range    WBC 30.8 (H) 4.8 - 10.8 K/uL    RBC 3.34 (L) 4.20 - 5.40 M/uL    Hemoglobin 10.6 (L) 12.0 - 16.0 g/dL    Hematocrit 34.6 (L) 37.0 - 47.0 %    .6 (H) 81.4 - 97.8 fL    MCH 31.7 27.0 - 33.0 pg    MCHC 30.6 (L) 32.2 - 35.5 g/dL    RDW 62.4 (H) 35.9 - 50.0 fL    Platelet Count 324 164 - 446 K/uL    MPV 10.0 9.0 - 12.9 fL    Neutrophils-Polys 12.00 (L) 44.00 - 72.00 %    Lymphocytes 88.00 (H) 22.00 - 41.00 %    Monocytes 0.00 0.00 - 13.40 %    Eosinophils 0.00 0.00 - 6.90 %    Basophils 0.00 0.00 - 1.80 %    Nucleated RBC 0.00 0.00 - 0.20 /100 WBC    Neutrophils (Absolute) 3.70 1.82 - 7.42 K/uL    Lymphs (Absolute) 27.10 (H) 1.00 - 4.80 K/uL    Monos (Absolute) 0.00 0.00 - 0.85 K/uL    Eos (Absolute) 0.00 0.00 - 0.51 K/uL    Tuyeto  (Absolute) 0.00 0.00 - 0.12 K/uL    NRBC (Absolute) 0.00 K/uL    Anisocytosis 1+     Macrocytosis 2+ (A)    Complete Metabolic Panel (CMP)   Result Value Ref Range    Sodium 142 135 - 145 mmol/L    Potassium 4.5 3.6 - 5.5 mmol/L    Chloride 107 96 - 112 mmol/L    Co2 25 20 - 33 mmol/L    Anion Gap 10.0 7.0 - 16.0    Glucose 94 65 - 99 mg/dL    Bun 11 8 - 22 mg/dL    Creatinine 0.81 0.50 - 1.40 mg/dL    Calcium 9.0 8.4 - 10.2 mg/dL    Correct Calcium 8.7 8.5 - 10.5 mg/dL    AST(SGOT) 22 12 - 45 U/L    ALT(SGPT) 11 2 - 50 U/L    Alkaline Phosphatase 87 30 - 99 U/L    Total Bilirubin 0.2 0.1 - 1.5 mg/dL    Albumin 4.4 3.2 - 4.9 g/dL    Total Protein 6.3 6.0 - 8.2 g/dL    Globulin 1.9 1.9 - 3.5 g/dL    A-G Ratio 2.3 g/dL   Troponin STAT   Result Value Ref Range    Troponin T 48 (H) 6 - 19 ng/L   proBrain Natriuretic Peptide, NT   Result Value Ref Range    NT-proBNP 903 (H) 0 - 125 pg/mL   CoV-2, FLU A/B, and RSV by PCR (2-4 Hours CEPHEID) : Collect NP swab in VTM    Specimen: Respirate   Result Value Ref Range    Influenza virus A RNA Negative Negative    Influenza virus B, PCR Negative Negative    RSV, PCR Negative Negative    SARS-CoV-2 by PCR NotDetected     SARS-CoV-2 Source Nasal Swab    ESTIMATED GFR   Result Value Ref Range    GFR (CKD-EPI) 78 >60 mL/min/1.73 m 2   TROPONIN   Result Value Ref Range    Troponin T 20 (H) 6 - 19 ng/L   DIFFERENTIAL MANUAL   Result Value Ref Range    Manual Diff Status PERFORMED    PLATELET ESTIMATE   Result Value Ref Range    Plt Estimation Normal    MORPHOLOGY   Result Value Ref Range    RBC Morphology Present     Reactive Lymphocytes Many    EKG   Result Value Ref Range    Report       [facility] Emergency Dept.    Test Date:  2023  Pt Name:    VINNY MCDANIELS           Department: [facility]  MRN:        [id number]                      Room:       -ROOM 10  Gender:     Female                       Technician: [id number]  :        [id number]                    Requested By:LEONA JOSE  Order #:    [id number]                    Reading MD: LEONA JOSE MD    Measurements  Intervals                                Axis  Rate:       61                           P:          17  MD:         154                          QRS:        4  QRSD:       95                           T:          40  QT:         450  QTc:        454    Interpretive Statements  Sinus rhythm  Borderline T wave abnormalities  Baseline wander in lead(s) III  Compared to ECG 08/20/2023 11:16:29  Sinus arrhythmia no longer present  Ventricular premature complex(es) no longer present  T-wave abnormality still present  Electronically Signed On 08- 12:06:29 PDT by LEONA JOSE MD          RADIOLOGY  I have independently interpreted the diagnostic imaging associated with this visit and am waiting the final reading from the radiologist.   My preliminary interpretation is as follows: Chest x-ray no infiltrate or pleural effusion  Radiologist interpretation:   DX-CHEST-PORTABLE (1 VIEW)   Final Result         1. No acute cardiopulmonary abnormalities are identified.            COURSE & MEDICAL DECISION MAKING    ED Observation Status? Yes; I am placing the patient in to an observation status due to a diagnostic uncertainty as well as therapeutic intensity. Patient placed in observation status at 11:51 AM, 8/21/2023.     Observation plan is as follows: Chest x-ray serial troponins ambulation trial of her oxygen    Upon Reevaluation, the patient's condition has: Improved; and will be discharged.    Patient discharged from ED Observation status at 2:20 PM  (Time) 8/21/23 (Date).     INITIAL ASSESSMENT, COURSE AND PLAN  Care Narrative: Vaishali Emery is a 68 y.o. female who presents planing of shortness of breath worse with laying flat.  She has been short of breath ever since she was discharged from the hospital on the 13th of this month.  She was actually seen yesterday at [facility] for  the same complaint along with chronic pain and was discharged home she did not qualify her for oxygen at that time.  The patient states that her primary care doctor did testing and she did qualify for oxygen but has not received it yet at home.  She states that she has to sleep sitting up because it is too much for her to lay down she cannot catch her breath.  She has noticed increased swelling in her legs.  She is on chronic pain medication and states she has not had a bowel movement in over a week.  She is not vomiting.  She denies any fevers or chills.  She denies any productive cough.  She has no pain in her chest.  The patient does have a recent diagnosis of a lung mass in her right lower lobe and is supposed to get a PET scan.  She also has CLL coronary artery disease and COPD.  Patient states that she quit smoking a week ago.     Differential diagnosis: COPD exacerbation, recurrent pneumonia, congestive heart failure,  ADDITIONAL PROBLEM LIST  CLL  Coronary artery disease status post bypass  High cholesterol  Hypertension  Former smoker  Depression  Anemia  DISPOSITION AND DISCUSSIONS    Patient's COVID flu and RSV is negative.  Her CBC has an elevated white count of 30.8 which is about her baseline.  Hemoglobin 10.6 she does have chronic anemia.  BNP is elevated at 903.  Competence metabolic panel is unremarkable with normal electrolytes normal kidney function and normal liver function test.  We ambulated the patient in the emergency department but could not reproduce the hypoxia.  She does have the oxygen ordered but will have to pay out-of-pocket for it.  I am awaiting a second troponin.  Upon recheck the patient feels improved and wants to be discharged home.  I explained to her that as long as a second troponin was unchanged she would be discharged home to follow-up with her primary care physician.    I was awaiting the second troponin but the patient did not want to wait for it to come back.  She wants  to be discharged home.  I informed her that if her troponin was going up with her shortness of breath she should stay in the hospital for further cardiac evaluation.  The patient does not want to stay wait for the second troponin result so I will sign her out AGAINST MEDICAL ADVICE.  I advised her that she could return at any time for any worsening symptoms.        The patient is leaving against medical advice.  I discussed with the patient the risks of leaving without receiving appropriate care to include permanent disability or death.  I have discussed possible alternatives.  The patient is not intoxicated.  The patient is a capable decision maker and understands the risks and benefits of their decision.  While I certainly disagree with the patient's decision, I respect the patient's autonomy and will not keep them here against their will.  They understand that their decision to leave can be reversed at any time and they can return to us at any time for any reason at all.    Patient's second troponin is down to 20.  Gives her a heart score of 5 but I do not believe this is was causing her shortness of breath.  The patient did home with some oxygen tubing so she can get her oxygen from [employer].       I have discussed management of the patient with the following physicians and OC's:  none    Discussion of management with other Q or appropriate source(s): Case Management regarding her oxygen ordered by her primary care physician      Escalation of care considered, and ultimately not performed:acute inpatient care management, however at this time, the patient is most appropriate for outpatient management    Barriers to care at this time, including but not limited to: none.     Decision tools and prescription drugs considered including, but not limited to:  heart score 5 .  The patient will return for new or worsening symptoms and is stable at the time of discharge.    The patient is referred to a  primary physician for blood pressure management, diabetic screening, and for all other preventative health concerns.      DISPOSITION:  Patient will be discharged home in stable condition.    FOLLOW UP:  Apple Gates M.D.  [address] [place] [address]  [phone]    Call in 1 day      [facility], Emergency Dept  [address]  [place] [address] [phone]    As needed, If symptoms worsen      OUTPATIENT MEDICATIONS:  Discharge Medication List as of 8/21/2023  2:28 PM          FINAL DIAGNOSIS  1. Dyspnea, unspecified type    2. Chronic obstructive pulmonary disease, unspecified COPD type (HCC)    3. CLL (chronic lymphocytic leukemia) (HCC)    4. Lung mass           Electronically signed by: Dora Andrade M.D., 8/21/2023 11:48 AM

## 2023-08-21 NOTE — DISCHARGE PLANNING
Anticipated Discharge Disposition: Home with o2/    Action: ER CM called Dr Granados office   and spoke with Dimple. They sent STAT order with Sat of 88 to [facility] because pt wanted to change company. Sent orders 1 hr ago. ER CM will contact them immediately and advise them that pt is in Er for 2nd visit in 24 hr. She has comorbidity Leukemia stage 4,CAD COPD. Spoke with Garret at [facility] and it is processed and approved. Son will  tanks and concentrator. Patient has been in 2 times in past 24 hr. So they chose to private pay for asap and process for insurance next month. They may not have qualified? ER CM will ask for sats and orders with ERP to see if qualifies. Garret at [facility] will cont to eval monthly for insurance appropriateness.    Barriers to Discharge: Home with O2    Plan: Oxygen will picked up private pay by son.Will cont to follow for needs

## 2023-08-21 NOTE — ED NOTES
Med rec updated and complete, per pt and pts son   Allergies reviewed, per pt  Pt asked me to call her son to verify all her medications.  Called pts son Jo-Ann) [phone] to verify all medications.  Per pt son reports that pt did take her ATORVASTATIN 40MG yesterday morning.

## 2023-08-21 NOTE — ED TRIAGE NOTES
"Pt comes in c/o SOB for several days now   her PCP ordered O2 for pt's low O2 level when she was seen by him  however that was 5 days ago and she has not received it as of yet   c/o SOB w/ activity and laying down  recent Dx w/ \"lung mass\" however has not had this evaluated as of yet    she states she is in need of oxygen   noted in triage RA sat 94% at rest   pt has stage 4 lymphoma   "

## 2023-08-21 NOTE — ED NOTES
"Patient wanting to leave the ED now. Patient told we are waiting on her troponin to return. Pt is persistent that she does not want to wait and wants to leave this moment. Patient states she does not the rampiril dose that was offered to her. She states \"I have it at home.\" Patient encouraged to return to ED at any time. Patient verbalized understanding.   "

## 2023-08-21 NOTE — ED NOTES
Vaishali Emery expresses desire to leave. Risks in leaving ED before treatment given and diagnostics completed discussed with patient. EP completed AMA form and patient  signed form.

## 2023-09-21 ENCOUNTER — APPOINTMENT (OUTPATIENT)
Dept: URGENT CARE | Facility: PHYSICIAN GROUP | Age: 68
End: 2023-09-21
Payer: MEDICARE

## 2023-09-24 ENCOUNTER — HOSPITAL ENCOUNTER (EMERGENCY)
Facility: MEDICAL CENTER | Age: 68
End: 2023-09-24
Attending: EMERGENCY MEDICINE
Payer: MEDICARE

## 2023-09-24 ENCOUNTER — APPOINTMENT (OUTPATIENT)
Dept: RADIOLOGY | Facility: MEDICAL CENTER | Age: 68
End: 2023-09-24
Attending: EMERGENCY MEDICINE
Payer: MEDICARE

## 2023-09-24 VITALS
HEIGHT: 61 IN | SYSTOLIC BLOOD PRESSURE: 147 MMHG | RESPIRATION RATE: 16 BRPM | BODY MASS INDEX: 23.89 KG/M2 | HEART RATE: 63 BPM | DIASTOLIC BLOOD PRESSURE: 82 MMHG | TEMPERATURE: 97.6 F | OXYGEN SATURATION: 91 % | WEIGHT: 126.54 LBS

## 2023-09-24 DIAGNOSIS — R50.9 FEVER, UNSPECIFIED FEVER CAUSE: ICD-10-CM

## 2023-09-24 DIAGNOSIS — R11.2 NAUSEA AND VOMITING, UNSPECIFIED VOMITING TYPE: ICD-10-CM

## 2023-09-24 DIAGNOSIS — R19.7 DIARRHEA, UNSPECIFIED TYPE: ICD-10-CM

## 2023-09-24 LAB
ALBUMIN SERPL BCP-MCNC: 4.3 G/DL (ref 3.2–4.9)
ALBUMIN/GLOB SERPL: 1.7 G/DL
ALP SERPL-CCNC: 94 U/L (ref 30–99)
ALT SERPL-CCNC: 12 U/L (ref 2–50)
ANION GAP SERPL CALC-SCNC: 13 MMOL/L (ref 7–16)
ANISOCYTOSIS BLD QL SMEAR: ABNORMAL
APTT PPP: 28.8 SEC (ref 24.7–36)
AST SERPL-CCNC: 22 U/L (ref 12–45)
BASOPHILS # BLD AUTO: 0 % (ref 0–1.8)
BASOPHILS # BLD: 0 K/UL (ref 0–0.12)
BILIRUB SERPL-MCNC: 0.2 MG/DL (ref 0.1–1.5)
BUN SERPL-MCNC: 20 MG/DL (ref 8–22)
BURR CELLS BLD QL SMEAR: NORMAL
CALCIUM ALBUM COR SERPL-MCNC: 8.7 MG/DL (ref 8.5–10.5)
CALCIUM SERPL-MCNC: 8.9 MG/DL (ref 8.5–10.5)
CHLORIDE SERPL-SCNC: 103 MMOL/L (ref 96–112)
CO2 SERPL-SCNC: 23 MMOL/L (ref 20–33)
COMMENT NL1176: NORMAL
CREAT SERPL-MCNC: 1.11 MG/DL (ref 0.5–1.4)
EOSINOPHIL # BLD AUTO: 0 K/UL (ref 0–0.51)
EOSINOPHIL NFR BLD: 0 % (ref 0–6.9)
ERYTHROCYTE [DISTWIDTH] IN BLOOD BY AUTOMATED COUNT: 57.3 FL (ref 35.9–50)
GFR SERPLBLD CREATININE-BSD FMLA CKD-EPI: 54 ML/MIN/1.73 M 2
GLOBULIN SER CALC-MCNC: 2.5 G/DL (ref 1.9–3.5)
GLUCOSE SERPL-MCNC: 100 MG/DL (ref 65–99)
HCT VFR BLD AUTO: 39.4 % (ref 37–47)
HGB BLD-MCNC: 12.2 G/DL (ref 12–16)
INR PPP: 0.94 (ref 0.87–1.13)
LACTATE SERPL-SCNC: 0.6 MMOL/L (ref 0.5–2)
LYMPHOCYTES # BLD AUTO: 14.78 K/UL (ref 1–4.8)
LYMPHOCYTES NFR BLD: 67.8 % (ref 22–41)
MACROCYTES BLD QL SMEAR: ABNORMAL
MANUAL DIFF BLD: NORMAL
MCH RBC QN AUTO: 31.4 PG (ref 27–33)
MCHC RBC AUTO-ENTMCNC: 31 G/DL (ref 32.2–35.5)
MCV RBC AUTO: 101.5 FL (ref 81.4–97.8)
MONOCYTES # BLD AUTO: 1.11 K/UL (ref 0–0.85)
MONOCYTES NFR BLD AUTO: 5.1 % (ref 0–13.4)
MORPHOLOGY BLD-IMP: NORMAL
NEUTROPHILS # BLD AUTO: 5.91 K/UL (ref 1.82–7.42)
NEUTROPHILS NFR BLD: 25.4 % (ref 44–72)
NEUTS BAND NFR BLD MANUAL: 1.7 % (ref 0–10)
NRBC # BLD AUTO: 0 K/UL
NRBC BLD-RTO: 0 /100 WBC (ref 0–0.2)
PLATELET # BLD AUTO: 277 K/UL (ref 164–446)
PLATELET BLD QL SMEAR: NORMAL
PMV BLD AUTO: 10.3 FL (ref 9–12.9)
POIKILOCYTOSIS BLD QL SMEAR: NORMAL
POTASSIUM SERPL-SCNC: 4.6 MMOL/L (ref 3.6–5.5)
PROT SERPL-MCNC: 6.8 G/DL (ref 6–8.2)
PROTHROMBIN TIME: 12.6 SEC (ref 12–14.6)
RBC # BLD AUTO: 3.88 M/UL (ref 4.2–5.4)
RBC BLD AUTO: PRESENT
SMUDGE CELLS BLD QL SMEAR: NORMAL
SODIUM SERPL-SCNC: 139 MMOL/L (ref 135–145)
WBC # BLD AUTO: 21.8 K/UL (ref 4.8–10.8)

## 2023-09-24 PROCEDURE — 85007 BL SMEAR W/DIFF WBC COUNT: CPT

## 2023-09-24 PROCEDURE — 71045 X-RAY EXAM CHEST 1 VIEW: CPT

## 2023-09-24 PROCEDURE — 85610 PROTHROMBIN TIME: CPT

## 2023-09-24 PROCEDURE — 87040 BLOOD CULTURE FOR BACTERIA: CPT | Mod: 91

## 2023-09-24 PROCEDURE — 80053 COMPREHEN METABOLIC PANEL: CPT

## 2023-09-24 PROCEDURE — 99285 EMERGENCY DEPT VISIT HI MDM: CPT

## 2023-09-24 PROCEDURE — 700105 HCHG RX REV CODE 258: Mod: UD | Performed by: EMERGENCY MEDICINE

## 2023-09-24 PROCEDURE — 85730 THROMBOPLASTIN TIME PARTIAL: CPT

## 2023-09-24 PROCEDURE — 83605 ASSAY OF LACTIC ACID: CPT

## 2023-09-24 PROCEDURE — 36415 COLL VENOUS BLD VENIPUNCTURE: CPT

## 2023-09-24 PROCEDURE — 85025 COMPLETE CBC W/AUTO DIFF WBC: CPT

## 2023-09-24 PROCEDURE — 96374 THER/PROPH/DIAG INJ IV PUSH: CPT

## 2023-09-24 PROCEDURE — 700111 HCHG RX REV CODE 636 W/ 250 OVERRIDE (IP): Mod: JZ,UD | Performed by: EMERGENCY MEDICINE

## 2023-09-24 RX ORDER — SODIUM CHLORIDE, SODIUM LACTATE, POTASSIUM CHLORIDE, CALCIUM CHLORIDE 600; 310; 30; 20 MG/100ML; MG/100ML; MG/100ML; MG/100ML
1000 INJECTION, SOLUTION INTRAVENOUS ONCE
Status: COMPLETED | OUTPATIENT
Start: 2023-09-24 | End: 2023-09-24

## 2023-09-24 RX ORDER — ONDANSETRON 2 MG/ML
4 INJECTION INTRAMUSCULAR; INTRAVENOUS ONCE
Status: COMPLETED | OUTPATIENT
Start: 2023-09-24 | End: 2023-09-24

## 2023-09-24 RX ORDER — ONDANSETRON 4 MG/1
4 TABLET, ORALLY DISINTEGRATING ORAL EVERY 8 HOURS PRN
Qty: 20 TABLET | Refills: 0 | Status: SHIPPED | OUTPATIENT
Start: 2023-09-24

## 2023-09-24 RX ADMIN — SODIUM CHLORIDE, POTASSIUM CHLORIDE, SODIUM LACTATE AND CALCIUM CHLORIDE 1000 ML: 600; 310; 30; 20 INJECTION, SOLUTION INTRAVENOUS at 16:21

## 2023-09-24 RX ADMIN — ONDANSETRON 4 MG: 2 INJECTION INTRAMUSCULAR; INTRAVENOUS at 16:20

## 2023-09-24 ASSESSMENT — FIBROSIS 4 INDEX: FIB4 SCORE: 1.41

## 2023-09-24 ASSESSMENT — LIFESTYLE VARIABLES: DO YOU DRINK ALCOHOL: NO

## 2023-09-24 NOTE — ED TRIAGE NOTES
"Chief Complaint   Patient presents with    Fever     Patient reports fever of 102.9 at home for past 2 days with N/V. Generalized body pain. Has been taking ibuprofen every couple hours. Hx of CLL. Not yet on chemo or radiation. Reports break out on her vagina with pain too       Patient to triage ambulatory with a steady gait, AAOx4, Appropriate precautions in place.     Explained wait time and triage process. Placed back in lobby. Told to notify ED tech or RN of any changes, verbalized understanding.    BP (!) 141/62   Pulse 60   Temp 36.9 °C (98.4 °F) (Oral)   Resp 16   Ht 1.549 m (5' 1\")   Wt 57.4 kg (126 lb 8.7 oz)   SpO2 93%   BMI 23.91 kg/m²     "

## 2023-09-24 NOTE — ED PROVIDER NOTES
ED Provider Note    CHIEF COMPLAINT  Chief Complaint   Patient presents with    Fever     Patient reports fever of 102.9 at home for past 2 days with N/V. Generalized body pain. Has been taking ibuprofen every couple hours. Hx of CLL. Not yet on chemo or radiation. Reports break out on her vagina with pain too       EXTERNAL RECORDS REVIEWED  PDMP the patient is on 150 oxycodones per month along with a fentanyl patch    HPI/ROS  LIMITATION TO HISTORY   Select: : None  OUTSIDE HISTORIAN(S):  None    Vaishali Emery is a 68 y.o. female who presents with past medical history significant for CLL followed by Dr. Orosco of oncology, she reports fever with vomiting and diarrhea over the last 2 days.  She denies any travel out of the [place] states recently.  She was admitted for pneumonia over 3 weeks ago and at that time was on antibiotics.  She denies any abdominal pain.  She denies any urinary symptoms.  She says she has a very slight cough.  She reports she is not on chemo or radiation therapy currently.  She also reports she has a firm lump at the base of her right labia.    PAST MEDICAL HISTORY   has a past medical history of Anesthesia, Angina, Angina pectoris (6/19/2009), Arthritis, Backpain (1998), Bowel habit changes, Breath shortness, CAD (coronary artery disease), Cancer ([facility]), Cervical radiculopathy (4/1/2009), Chronic pain (6/29/2011), COPD (chronic obstructive pulmonary disease) ([facility]) (4/1/2009), Dental disorder, Depression (4/1/2009), Dyslipidemia (6/29/2011), Headache(784.0) (4/1/2009), Heart burn, High cholesterol, History of anemia, History of cardiac catheterization (4/1/2009), History of coronary artery bypass graft (4/1/2009), History of gastrointestinal bleeding (4/1/2009), History of hypertension (6/19/2009), History of mixed hyperlipidemia (6/19/2009), Lumbar radiculopathy (4/1/2009), Pneumonia (1990), PONV (postoperative nausea and vomiting), Sepsis ([facility]) (2006 or 2007), Sleep apnea,  Snoring, and Unspecified cataract.    SURGICAL HISTORY   has a past surgical history that includes other abdominal surgery (); other (); other cardiac surgery (); basal cell excision (2015); flap closure (2015); ritika holes; other neurological surg (,); hardware removal neuro (N/A, 2016); cataract extraction with iol (Bilateral, ); cervical disk and fusion anterior (2016); reconstr total shoulder implant (Left, 9/15/2020); and biceps tenodesis (Left, 9/15/2020).    FAMILY HISTORY  Family History   Problem Relation Age of Onset    Cancer Father         brain       SOCIAL HISTORY  Social History     Tobacco Use    Smoking status: Former     Current packs/day: 0.00     Average packs/day: 0.5 packs/day for 40.0 years (20.0 ttl pk-yrs)     Types: Cigarettes     Start date: 1966     Quit date: 2006     Years since quittin.6    Smokeless tobacco: Never   Vaping Use    Vaping Use: Every day    Substances: Nicotine    Devices: Pre-filled or refillable cartridge   Substance and Sexual Activity    Alcohol use: No    Drug use: No    Sexual activity: Not on file       CURRENT MEDICATIONS  Home Medications       Reviewed by Aria Howe R.N. (Registered Nurse) on 23 at 1422  Med List Status: Partial     Medication Last Dose Status   aspirin 81 MG EC tablet  Active   atorvastatin (LIPITOR) 40 MG Tab  Active   baclofen (LIORESAL) 20 MG tablet  Active   Cholecalciferol (D3 PO)  Active   DULoxetine (CYMBALTA) 60 MG Cap DR Particles delayed-release capsule  Active   Ferrous Sulfate (IRON PO)  Active   gabapentin (NEURONTIN) 800 MG tablet  Active   metoprolol tartrate (LOPRESSOR) 25 MG Tab  Active   multivitamin Tab  Active   Omega-3 Fatty Acids (FISH OIL PO)  Active   ramipril (ALTACE) 10 MG capsule  Active                    ALLERGIES  Allergies   Allergen Reactions    Povidone Iodine Rash     rash       PHYSICAL EXAM  VITAL SIGNS: BP (!) 141/62   Pulse 60   Temp 36.9 °C  "(98.4 °F) (Oral)   Resp 16   Ht 1.549 m (5' 1\")   Wt 57.4 kg (126 lb 8.7 oz)   SpO2 93%   BMI 23.91 kg/m²    Constitutional: Alert.  HENT: No signs of trauma, Bilateral external ears normal, Nose normal.   Eyes: Pupils are equal and reactive, Conjunctiva normal, Non-icteric.   Neck: Normal range of motion, No tenderness, Supple, No stridor.   Lymphatic: No lymphadenopathy noted.   Cardiovascular: Regular rate and rhythm, no murmurs.   Thorax & Lungs: Normal breath sounds, No respiratory distress, No wheezing, No chest tenderness.   Abdomen: Bowel sounds normal, Soft, No tenderness, No peritoneal signs, No masses, No pulsatile masses.   Skin: Warm, Dry, No erythema, No rash.   Back: No bony tenderness, No CVA tenderness.   Extremities: Intact distal pulses, No edema, No tenderness, No cyanosis.  Musculoskeletal: Good range of motion in all major joints. No tenderness to palpation or major deformities noted.   Neurologic: Alert , Normal motor function, Normal sensory function, No focal deficits noted.   Psychiatric: Affect normal, Judgment normal, Mood normal.   Genitourinary: With female chaperone the patient has a 3 mm palpable mass in her right labia.  There is no fluctuance.    DIAGNOSTIC STUDIES / PROCEDURES      LABS  Labs Reviewed   CBC WITH DIFFERENTIAL - Abnormal; Notable for the following components:       Result Value    WBC 21.8 (*)     RBC 3.88 (*)     .5 (*)     MCHC 31.0 (*)     RDW 57.3 (*)     Neutrophils-Polys 25.40 (*)     Lymphocytes 67.80 (*)     Lymphs (Absolute) 14.78 (*)     Monos (Absolute) 1.11 (*)     All other components within normal limits    Narrative:     Protective  Indicate which anticoagulants the patient is on:->UNKNOWN   COMP METABOLIC PANEL - Abnormal; Notable for the following components:    Glucose 100 (*)     All other components within normal limits    Narrative:     Protective  Indicate which anticoagulants the patient is on:->UNKNOWN   ESTIMATED GFR - Abnormal; " Notable for the following components:    GFR (CKD-EPI) 54 (*)     All other components within normal limits    Narrative:     Protective  Indicate which anticoagulants the patient is on:->UNKNOWN   LACTIC ACID    Narrative:     Protective  Indicate which anticoagulants the patient is on:->UNKNOWN   PROTHROMBIN TIME    Narrative:     Protective  Indicate which anticoagulants the patient is on:->UNKNOWN   APTT    Narrative:     Protective  Indicate which anticoagulants the patient is on:->UNKNOWN   DIFFERENTIAL MANUAL    Narrative:     Protective  Indicate which anticoagulants the patient is on:->UNKNOWN   PERIPHERAL SMEAR REVIEW    Narrative:     Protective  Indicate which anticoagulants the patient is on:->UNKNOWN   PLATELET ESTIMATE    Narrative:     Protective  Indicate which anticoagulants the patient is on:->UNKNOWN   MORPHOLOGY    Narrative:     Protective  Indicate which anticoagulants the patient is on:->UNKNOWN   BLOOD CULTURE    Narrative:     Protective  Blood Cultures X2. Draw one blood culture from central line  (including implanted port) and one blood culture  peripherally. If no central line present draw blood cultures  times two peripherally from different sites  Release to patient->Immediate   BLOOD CULTURE    Narrative:     Protective  Blood Cultures X2. Draw one blood culture from central line  (including implanted port) and one blood culture  peripherally. If no central line present draw blood cultures  times two peripherally from different sites  Release to patient->Immediate   PATH INTERP HEME    Narrative:     Protective  Indicate which anticoagulants the patient is on:->UNKNOWN         RADIOLOGY  I have independently interpreted the diagnostic imaging associated with this visit and am waiting the final reading from the radiologist.   My preliminary interpretation is as follows: Negative chest x-ray  Radiologist interpretation:     DX-CHEST-PORTABLE (1 VIEW)   Final Result      No acute  cardiopulmonary abnormality identified.            COURSE & MEDICAL DECISION MAKING    ED Observation Status? Yes; I am placing the patient in to an observation status due to a diagnostic uncertainty as well as therapeutic intensity. Patient placed in observation status at 3:35 PM, 9/24/2023.     Observation plan is as follows: The patient presents with fever, vomiting and diarrhea.  Labs were ordered, chest x-ray was ordered.    Upon Reevaluation, the patient's condition has: Improved; and will be discharged.    Patient discharged from ED Observation status at 5:10 PM (Time) September 24, 2023 (Date).     INITIAL ASSESSMENT, COURSE AND PLAN  Care Narrative:     The patient's white blood count is 21,000, usually runs 30,000.  She is not absolutely neutropenic.  Her CMP is normal with no evidence of dehydration.        ADDITIONAL PROBLEM LIST  CLL, coronary artery disease, COPD, high cholesterol, hypertension  DISPOSITION AND DISCUSSIONS  I have discussed management of the patient with the following physicians and OC's: I spoke with Dr. Fitzgerald on-call for oncology for her oncologist Dr. Orosco.  We agree with a normal ANC and the lab work that the patient is safe to be discharged.  I will prescribe her Zofran.  She will return for worsening symptoms.    Discussion of management with other Q or appropriate source(s): None     Escalation of care considered, and ultimately not performed:acute inpatient care management, however at this time, the patient is most appropriate for outpatient management    Barriers to care at this time, including but not limited to:  None .     Decision tools and prescription drugs considered including, but not limited to:  Outpatient prescription for Zofran .    The patient will return for new or worsening symptoms and is stable at the time of discharge.    The patient is referred to a primary physician for blood pressure management, diabetic screening, and for all other preventative  health concerns.        DISPOSITION:  Patient will be discharged home in stable condition.    FOLLOW UP:  [facility], Emergency Dept  [address]  [place] [address] [phone]  Follow up  If symptoms worsen    Apple Gates M.D.  [address] [place] [address]  [phone]    Follow up  As needed      OUTPATIENT MEDICATIONS:  New Prescriptions    ONDANSETRON (ZOFRAN ODT) 4 MG TABLET DISPERSIBLE    Take 1 Tablet by mouth every 8 hours as needed for Nausea/Vomiting.         FINAL DIAGNOSIS  1. Nausea and vomiting, unspecified vomiting type    2. Diarrhea, unspecified type    3. Fever, unspecified fever cause           Electronically signed by: Norman Thurman M.D., 9/24/2023 3:35 PM

## 2023-11-18 ENCOUNTER — HOSPITAL ENCOUNTER (EMERGENCY)
Facility: MEDICAL CENTER | Age: 68
End: 2023-11-18
Attending: EMERGENCY MEDICINE
Payer: MEDICARE

## 2023-11-18 ENCOUNTER — APPOINTMENT (OUTPATIENT)
Dept: RADIOLOGY | Facility: MEDICAL CENTER | Age: 68
End: 2023-11-18
Attending: EMERGENCY MEDICINE
Payer: MEDICARE

## 2023-11-18 VITALS
WEIGHT: 124.12 LBS | OXYGEN SATURATION: 98 % | HEART RATE: 65 BPM | TEMPERATURE: 97.9 F | RESPIRATION RATE: 16 BRPM | HEIGHT: 62 IN | DIASTOLIC BLOOD PRESSURE: 60 MMHG | BODY MASS INDEX: 22.84 KG/M2 | SYSTOLIC BLOOD PRESSURE: 128 MMHG

## 2023-11-18 DIAGNOSIS — S50.12XA CONTUSION OF LEFT FOREARM, INITIAL ENCOUNTER: ICD-10-CM

## 2023-11-18 DIAGNOSIS — C95.90 LEUKEMIA NOT HAVING ACHIEVED REMISSION, UNSPECIFIED LEUKEMIA TYPE (HCC): ICD-10-CM

## 2023-11-18 DIAGNOSIS — M79.632 PAIN OF LEFT FOREARM: ICD-10-CM

## 2023-11-18 LAB
ALBUMIN SERPL BCP-MCNC: 4.7 G/DL (ref 3.2–4.9)
ALBUMIN/GLOB SERPL: 2 G/DL
ALP SERPL-CCNC: 89 U/L (ref 30–99)
ALT SERPL-CCNC: 16 U/L (ref 2–50)
ANION GAP SERPL CALC-SCNC: 12 MMOL/L (ref 7–16)
ANISOCYTOSIS BLD QL SMEAR: ABNORMAL
AST SERPL-CCNC: 26 U/L (ref 12–45)
BASOPHILS # BLD AUTO: 0 % (ref 0–1.8)
BASOPHILS # BLD: 0 K/UL (ref 0–0.12)
BILIRUB SERPL-MCNC: 0.2 MG/DL (ref 0.1–1.5)
BUN SERPL-MCNC: 19 MG/DL (ref 8–22)
CALCIUM ALBUM COR SERPL-MCNC: 8.4 MG/DL (ref 8.5–10.5)
CALCIUM SERPL-MCNC: 9 MG/DL (ref 8.5–10.5)
CHLORIDE SERPL-SCNC: 106 MMOL/L (ref 96–112)
CK SERPL-CCNC: 54 U/L (ref 0–154)
CO2 SERPL-SCNC: 25 MMOL/L (ref 20–33)
COMMENT NL1176: NORMAL
CREAT SERPL-MCNC: 0.77 MG/DL (ref 0.5–1.4)
EOSINOPHIL # BLD AUTO: 0 K/UL (ref 0–0.51)
EOSINOPHIL NFR BLD: 0 % (ref 0–6.9)
ERYTHROCYTE [DISTWIDTH] IN BLOOD BY AUTOMATED COUNT: 55.2 FL (ref 35.9–50)
GFR SERPLBLD CREATININE-BSD FMLA CKD-EPI: 83 ML/MIN/1.73 M 2
GLOBULIN SER CALC-MCNC: 2.4 G/DL (ref 1.9–3.5)
GLUCOSE SERPL-MCNC: 95 MG/DL (ref 65–99)
HCT VFR BLD AUTO: 38.1 % (ref 37–47)
HGB BLD-MCNC: 12.1 G/DL (ref 12–16)
LYMPHOCYTES # BLD AUTO: 20.59 K/UL (ref 1–4.8)
LYMPHOCYTES NFR BLD: 70.5 % (ref 22–41)
MACROCYTES BLD QL SMEAR: ABNORMAL
MANUAL DIFF BLD: NORMAL
MCH RBC QN AUTO: 32.3 PG (ref 27–33)
MCHC RBC AUTO-ENTMCNC: 31.8 G/DL (ref 32.2–35.5)
MCV RBC AUTO: 101.6 FL (ref 81.4–97.8)
MONOCYTES # BLD AUTO: 2.39 K/UL (ref 0–0.85)
MONOCYTES NFR BLD AUTO: 8.2 % (ref 0–13.4)
MORPHOLOGY BLD-IMP: NORMAL
NEUTROPHILS # BLD AUTO: 4.56 K/UL (ref 1.82–7.42)
NEUTROPHILS NFR BLD: 15.6 % (ref 44–72)
NRBC # BLD AUTO: 0 K/UL
NRBC BLD-RTO: 0 /100 WBC (ref 0–0.2)
PLATELET # BLD AUTO: 251 K/UL (ref 164–446)
PLATELET BLD QL SMEAR: NORMAL
PMV BLD AUTO: 10.2 FL (ref 9–12.9)
POTASSIUM SERPL-SCNC: 5 MMOL/L (ref 3.6–5.5)
PROT SERPL-MCNC: 7.1 G/DL (ref 6–8.2)
RBC # BLD AUTO: 3.75 M/UL (ref 4.2–5.4)
RBC BLD AUTO: PRESENT
SMUDGE CELLS BLD QL SMEAR: NORMAL
SODIUM SERPL-SCNC: 143 MMOL/L (ref 135–145)
WBC # BLD AUTO: 29.2 K/UL (ref 4.8–10.8)
WBC OTHER NFR BLD MANUAL: 5.7 %

## 2023-11-18 PROCEDURE — 85007 BL SMEAR W/DIFF WBC COUNT: CPT

## 2023-11-18 PROCEDURE — 36415 COLL VENOUS BLD VENIPUNCTURE: CPT

## 2023-11-18 PROCEDURE — 96374 THER/PROPH/DIAG INJ IV PUSH: CPT

## 2023-11-18 PROCEDURE — 700111 HCHG RX REV CODE 636 W/ 250 OVERRIDE (IP): Mod: JZ,UD | Performed by: EMERGENCY MEDICINE

## 2023-11-18 PROCEDURE — 80503 PATH CLIN CONSLTJ SF 5-20: CPT

## 2023-11-18 PROCEDURE — 82550 ASSAY OF CK (CPK): CPT

## 2023-11-18 PROCEDURE — 85027 COMPLETE CBC AUTOMATED: CPT

## 2023-11-18 PROCEDURE — 73090 X-RAY EXAM OF FOREARM: CPT | Mod: LT

## 2023-11-18 PROCEDURE — 80053 COMPREHEN METABOLIC PANEL: CPT

## 2023-11-18 PROCEDURE — 99284 EMERGENCY DEPT VISIT MOD MDM: CPT

## 2023-11-18 RX ADMIN — FENTANYL CITRATE 50 MCG: 50 INJECTION, SOLUTION INTRAMUSCULAR; INTRAVENOUS at 15:33

## 2023-11-18 ASSESSMENT — FIBROSIS 4 INDEX: FIB4 SCORE: 1.58

## 2023-11-18 NOTE — ED TRIAGE NOTES
".BP (!) 153/122   Pulse 72   Temp 36.3 °C (97.3 °F) (Temporal)   Resp 16   Ht 1.575 m (5' 2\")   Wt 56.3 kg (124 lb 1.9 oz)   SpO2 98%   BMI 22.70 kg/m²   .  Chief Complaint   Patient presents with    Arm Pain     Pt c/c of L arm pain since yesterday. Bruising noted to inner wrist. denies trauma to arm or wrist. Pt states pain feels stabbing from wrist to forearm.      L forearm cool to touch, no pulses noted in triage.   Pt HX of recurrent cancer. Pt biopsy of mass on lung two weeks ago.   Pt states does not take thinners.   Pt Aox4, GCS15. Educated on triage process.   "

## 2023-11-18 NOTE — ED PROVIDER NOTES
"  ER Provider Note    Scribed for Oli Figueroa M.d. by Alta Lindquist. 11/18/2023  3:08 PM    Primary Care Provider: Apple Gates M.D.    CHIEF COMPLAINT  Chief Complaint   Patient presents with    Arm Pain     Pt c/c of L arm pain since yesterday. Bruising noted to inner wrist. denies trauma to arm or wrist. Pt states pain feels stabbing from wrist to forearm.      EXTERNAL RECORDS REVIEWED  Other Patient was last seen in the ED 9/24/23 for nausea and vomiting and was discharged on Zofran 4mg.     HPI/ROS  LIMITATION TO HISTORY   Select: : None  OUTSIDE HISTORIAN(S):  None    Vaishali Emery is a 68 y.o. female who presents to the ED complaining of left arm pain onset yesterday. The patient reports that her arm pain began at her left wrist and has begun to radiate up through her arm. She explains that her pain feels like a \"stabbing\" sensation and hurts worse in some spots. Patient denies finger pain. She denies any trauma or falls. She has a history of leukemia and third stage lung cancer for which she has a fentanyl patch. She has a history of open heart surgery, back surgery, and neck surgery.      PAST MEDICAL HISTORY  Past Medical History:   Diagnosis Date    Anesthesia     \"difficulty breathing\"    Angina     none currently     Angina pectoris 6/19/2009    Arthritis     Backpain 1998    Bowel habit changes     constipation    Breath shortness     hx of, not currently    CAD (coronary artery disease)     Cancer ([facility])     skin    Cervical radiculopathy 4/1/2009    Chronic pain 6/29/2011    COPD (chronic obstructive pulmonary disease) ([facility]) 4/1/2009    Dental disorder     upper/lower dentures    Depression 4/1/2009    Dyslipidemia 6/29/2011    Headache(784.0) 4/1/2009    Heart burn     High cholesterol     History of anemia     History of cardiac catheterization 4/1/2009    History of coronary artery bypass graft 4/1/2009    History of gastrointestinal bleeding 4/1/2009    History of " hypertension 6/19/2009    History of mixed hyperlipidemia 6/19/2009    Lumbar radiculopathy 4/1/2009    Pneumonia 1990    approximate date    PONV (postoperative nausea and vomiting)     Sepsis ([facility]) 2006 or 2007    Sleep apnea     before gastric bypass, no CPAP now    Snoring     Unspecified cataract     IOLS       SURGICAL HISTORY  Past Surgical History:   Procedure Laterality Date    PB RECONSTR TOTAL SHOULDER IMPLANT Left 9/15/2020    Procedure: ARTHROPLASTY, SHOULDER, TOTAL;  Surgeon: Juventino Greene M.D.;  Location: SURGERY [facility];  Service: Orthopedics    BICEPS TENODESIS Left 9/15/2020    Procedure: TENODESIS, BICEPS - AND ADDISON;  Surgeon: Juventino Greene M.D.;  Location: SURGERY [facility];  Service: Orthopedics    HARDWARE REMOVAL NEURO N/A 6/23/2016    Procedure: HARDWARE REMOVAL NEURO L3-S1;  Surgeon: Aaron Martines M.D.;  Location: SURGERY [facility];  Service:     CERVICAL DISK AND FUSION ANTERIOR  4/28/2016    Procedure: CERVICAL DISK AND FUSION ANTERIOR Cervical4-5 WITH PEEK INTERBODY;  Surgeon: Aaron Martines M.D.;  Location: SURGERY [facility];  Service:     BASAL CELL EXCISION  2/19/2015    Performed by Adán Blake Jr., M.D. at [facility]    FLAP CLOSURE  2/19/2015    Performed by Adán Blake Jr., M.D. at SURGERY [facility]    CATARACT EXTRACTION WITH IOL Bilateral 2015    OTHER ABDOMINAL SURGERY  2007    gastric bypass    OTHER  2007    neck surgery    OTHER NEUROLOGICAL SURG  2007,    6 back surgeries (at ages 13, 18, 20, 37, and 52)    OTHER CARDIAC SURGERY  2006    bypass x 4 vessels    ARLEN HOLES         FAMILY HISTORY  Family History   Problem Relation Age of Onset    Cancer Father         brain       SOCIAL HISTORY   reports that she quit smoking about 17 years ago. Her smoking use included cigarettes. She started smoking about 57 years ago. She has a 20.0 pack-year smoking history. She has never used smokeless tobacco. She reports that she does not  "drink alcohol and does not use drugs.    CURRENT MEDICATIONS  Previous Medications    ASPIRIN 81 MG EC TABLET    Take 1 Tablet by mouth every day.    ATORVASTATIN (LIPITOR) 40 MG TAB    Take 1 Tablet by mouth every evening.    BACLOFEN (LIORESAL) 20 MG TABLET    Take 20 mg by mouth 2 times a day as needed. Indications: Muscle Spasm    CHOLECALCIFEROL (D3 PO)    Take 1 Capsule by mouth every day.    DULOXETINE (CYMBALTA) 60 MG CAP DR PARTICLES DELAYED-RELEASE CAPSULE    Take 60 mg by mouth every day.    FERROUS SULFATE (IRON PO)    Take 1 Tablet by mouth every day. (OTC)    GABAPENTIN (NEURONTIN) 800 MG TABLET    Take 800 mg by mouth 3 times a day. Per [facility], sheduled, NOT prn    METOPROLOL TARTRATE (LOPRESSOR) 25 MG TAB    Take 0.5 Tablets by mouth 2 times a day.    MULTIVITAMIN TAB    Take 1 Tablet by mouth every day.    OMEGA-3 FATTY ACIDS (FISH OIL PO)    Take 1 Capsule by mouth every day.    ONDANSETRON (ZOFRAN ODT) 4 MG TABLET DISPERSIBLE    Take 1 Tablet by mouth every 8 hours as needed for Nausea/Vomiting.    RAMIPRIL (ALTACE) 10 MG CAPSULE    Take 10 mg by mouth every day.       ALLERGIES  Povidone iodine    PHYSICAL EXAM  BP (!) 153/122   Pulse 72   Temp 36.3 °C (97.3 °F) (Temporal)   Resp 16   Ht 1.575 m (5' 2\")   Wt 56.3 kg (124 lb 1.9 oz)   SpO2 98%   BMI 22.70 kg/m²   Constitutional: Well developed, Well nourished, Mild distress.   HENT: Normocephalic, Atraumatic  Eyes: Conjunctiva normal, No discharge.   Cardiovascular: Normal heart rate, Normal rhythm, No murmurs, equal pulses.   Pulmonary: Normal breath sounds, No respiratory distress, No wheezing, No rales, No rhonchi.  Chest: No chest wall tenderness or deformity.   Abdomen:Soft, No tenderness, No masses, no rebound, no guarding.   Extremities: Ecchymosis to left wrist, Tenderness to palpation to muscle, 2+ radial pulse, Normal capillary refill to all five fingers, Soft compartments no swelling in the arm  Skin: Warm, Dry, No erythema, No " rash.   Neurologic: Alert & oriented x 3, Normal motor function,  No focal deficits noted.   Psychiatric: Affect normal, Judgment normal, Mood normal.      DIAGNOSTIC STUDIES    Labs:   Results for orders placed or performed during the hospital encounter of 11/18/23   CBC WITH DIFFERENTIAL   Result Value Ref Range    WBC 29.2 (H) 4.8 - 10.8 K/uL    RBC 3.75 (L) 4.20 - 5.40 M/uL    Hemoglobin 12.1 12.0 - 16.0 g/dL    Hematocrit 38.1 37.0 - 47.0 %    .6 (H) 81.4 - 97.8 fL    MCH 32.3 27.0 - 33.0 pg    MCHC 31.8 (L) 32.2 - 35.5 g/dL    RDW 55.2 (H) 35.9 - 50.0 fL    Platelet Count 251 164 - 446 K/uL    MPV 10.2 9.0 - 12.9 fL    Neutrophils-Polys 15.60 (L) 44.00 - 72.00 %    Lymphocytes 70.50 (H) 22.00 - 41.00 %    Monocytes 8.20 0.00 - 13.40 %    Eosinophils 0.00 0.00 - 6.90 %    Basophils 0.00 0.00 - 1.80 %    Nucleated RBC 0.00 0.00 - 0.20 /100 WBC    Neutrophils (Absolute) 4.56 1.82 - 7.42 K/uL    Lymphs (Absolute) 20.59 (H) 1.00 - 4.80 K/uL    Monos (Absolute) 2.39 (H) 0.00 - 0.85 K/uL    Eos (Absolute) 0.00 0.00 - 0.51 K/uL    Baso (Absolute) 0.00 0.00 - 0.12 K/uL    NRBC (Absolute) 0.00 K/uL    Anisocytosis 1+     Macrocytosis 1+    COMP METABOLIC PANEL   Result Value Ref Range    Sodium 143 135 - 145 mmol/L    Potassium 5.0 3.6 - 5.5 mmol/L    Chloride 106 96 - 112 mmol/L    Co2 25 20 - 33 mmol/L    Anion Gap 12.0 7.0 - 16.0    Glucose 95 65 - 99 mg/dL    Bun 19 8 - 22 mg/dL    Creatinine 0.77 0.50 - 1.40 mg/dL    Calcium 9.0 8.5 - 10.5 mg/dL    Correct Calcium 8.4 (L) 8.5 - 10.5 mg/dL    AST(SGOT) 26 12 - 45 U/L    ALT(SGPT) 16 2 - 50 U/L    Alkaline Phosphatase 89 30 - 99 U/L    Total Bilirubin 0.2 0.1 - 1.5 mg/dL    Albumin 4.7 3.2 - 4.9 g/dL    Total Protein 7.1 6.0 - 8.2 g/dL    Globulin 2.4 1.9 - 3.5 g/dL    A-G Ratio 2.0 g/dL   CREATINE KINASE   Result Value Ref Range    CPK Total 54 0 - 154 U/L   ESTIMATED GFR   Result Value Ref Range    GFR (CKD-EPI) 83 >60 mL/min/1.73 m 2   PLATELET ESTIMATE    Result Value Ref Range    Plt Estimation Normal    MORPHOLOGY   Result Value Ref Range    RBC Morphology Present     Smudge Cells Few    PERIPHERAL SMEAR REVIEW   Result Value Ref Range    Peripheral Smear Review see below    DIFFERENTIAL MANUAL   Result Value Ref Range    Other 5.70 %    Manual Diff Status PERFORMED     Comment See Comment         Radiology:   The attending emergency physician has independently interpreted the diagnostic imaging associated with this visit and am waiting the final reading from the radiologist.   Preliminary interpretation is a follows: X-rays do not show any fracture  Radiologist interpretation:   DX-FOREARM LEFT   Final Result         No acute osseous abnormality.           COURSE & MEDICAL DECISION MAKING     ED Observation Status? Yes; I am placing the patient in to an observation status due to a diagnostic uncertainty as well as therapeutic intensity. Patient placed in observation status at 3:13 PM, 11/18/2023.     Observation plan is as follows: Monitor for symptom management and diagnostic results     Upon Reevaluation, the patient's condition has: Improved; and will be discharged.    Patient discharged from ED Observation status at 6 PM (Time) 11/18/2023  (Date).     INITIAL ASSESSMENT, COURSE AND PLAN  Care Narrative:     3:11 PM - Patient seen and examined at bedside. Discussed plan of care, including labs and imaging. Patient agrees to the plan of care. The patient will be medicated with Fentanyl 50mcg. Ordered for Creatine Kinase, CMP, CBC w/diff, Estimated GFR, DX-Forearm Left to evaluate her symptoms. Differential diagnoses include but not limited to: anemia, contusion, Myositis, fracture      6 PM reevaluated the patient her pain is somewhat improved.  Discussed with her that this point time I think her pain is secondary to contusion.  I think she has increased bruising secondary to her leukemia.  White cell blood count is elevated but this is likely secondary to her  leukemia     PROBLEM LIST  Problem #1 left forearm pain at this point time I suspect this is secondary to a contusion.  Patient does have leukemia and I suspect this is causing her bruise easily.  Patient's platelet count is within normal limits.  No swelling of the arm to indicate a DVT.  She has normal capillary refill time in all 5 fingers plus 2+ radial pulse I do not suspect a arterial occlusion.  X-rays done and does not show any fracture.  Or pathologic abnormality.    DISPOSITION AND DISCUSSIONS  I have discussed management of the patient with the following physicians and OC's: None    Discussion of management with other QHP or appropriate source(s): None         Decision tools and prescription drugs considered including, but not limited to: Pain Medications patient can use Tylenol for pain he has fentanyl at home .    FINAL DIAGNOSIS  1. Pain of left forearm    2. Contusion of left forearm, initial encounter    3. Leukemia not having achieved remission, unspecified leukemia type (HCC)       Alta MERINO (Scribe), am scribing for, and in the presence of, BRITANY Moreno*.    Electronically signed by: Alta Lindquist (Scribe), 11/18/2023    Oli MERINO M.* personally performed the services described in this documentation, as scribed by Alta Lindquist in my presence, and it is both accurate and complete.      The note accurately reflects work and decisions made by me.  Oli Figueroa M.D.  11/18/2023  6:02 PM

## 2023-11-19 NOTE — DISCHARGE INSTRUCTIONS
Ice to your arm 20 minutes on, 20 minutes off as often as possible help with the pain.  Return if you have swelling in the arm, cold blue hand, numbness or tingling.

## 2023-12-11 ENCOUNTER — APPOINTMENT (OUTPATIENT)
Dept: RADIOLOGY | Facility: MEDICAL CENTER | Age: 68
End: 2023-12-11
Attending: EMERGENCY MEDICINE
Payer: MEDICARE

## 2023-12-11 ENCOUNTER — HOSPITAL ENCOUNTER (EMERGENCY)
Facility: MEDICAL CENTER | Age: 68
End: 2023-12-11
Attending: EMERGENCY MEDICINE
Payer: MEDICARE

## 2023-12-11 VITALS
WEIGHT: 123.46 LBS | TEMPERATURE: 97.6 F | SYSTOLIC BLOOD PRESSURE: 120 MMHG | HEIGHT: 62 IN | RESPIRATION RATE: 15 BRPM | HEART RATE: 51 BPM | OXYGEN SATURATION: 97 % | BODY MASS INDEX: 22.72 KG/M2 | DIASTOLIC BLOOD PRESSURE: 58 MMHG

## 2023-12-11 DIAGNOSIS — Z79.02 ANTIPLATELET OR ANTITHROMBOTIC LONG-TERM USE: ICD-10-CM
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
## 2023-12-11 LAB — EKG IMPRESSION: NORMAL

## 2023-12-11 PROCEDURE — 70450 CT HEAD/BRAIN W/O DYE: CPT

## 2023-12-11 PROCEDURE — 93005 ELECTROCARDIOGRAM TRACING: CPT | Performed by: EMERGENCY MEDICINE

## 2023-12-11 PROCEDURE — 93005 ELECTROCARDIOGRAM TRACING: CPT

## 2023-12-11 PROCEDURE — 99284 EMERGENCY DEPT VISIT MOD MDM: CPT

## 2023-12-11 ASSESSMENT — FIBROSIS 4 INDEX: FIB4 SCORE: 1.79

## 2023-12-11 NOTE — ED NOTES
Patient discharged in stable condition per orders. Wristband removed per protocol. Patient verbalized understanding of all discharge instructions. All belongings accounted for. Patient to lobby via wheelchair accompanied by ED RN.

## 2023-12-11 NOTE — ED NOTES
Patient informed of discharge per orders - states that she will call her son to come pick her up and take her home.

## 2023-12-11 NOTE — ED TRIAGE NOTES
.  Chief Complaint   Patient presents with    Fall     Reports fall while in shower last night lower back pain     Low Back Pain     Sees pain management for chronic pain. Worse last 3 weeks after fall.     Headache     Hit head when fell abrasion to forehead.     Syncope     Apx midnight while in shower     Pt ambulated to triage with steady gait. Takes daily asa. Charge rn notified and tbi activated. Pt to charge desk by w/c.

## 2023-12-11 NOTE — ED PROVIDER NOTES
"  ER Provider Note    Scribed for Aman Muhammad M.D. by Marlyn Hillman. 12/11/2023   1:53 PM    Primary Care Provider: Apple Gates M.D.    CHIEF COMPLAINT  Chief Complaint   Patient presents with    Fall     Reports fall while in shower last night lower back pain     Low Back Pain     Sees pain management for chronic pain. Worse last 3 weeks after fall.     Headache     Hit head when fell abrasion to forehead.     Syncope     Apx midnight while in shower     EXTERNAL RECORDS REVIEWED  Outpatient Notes: The patient was last here on 11/18/23 for unrelated symptoms.    HPI/ROS  LIMITATION TO HISTORY   Select: : None  OUTSIDE HISTORIAN(S):  None noted    Vaishali Emery is a 68 y.o. female who presents to the ED as a code TBI following a fall last night at midnight while in the shower. She is currently complaining of a headache. She notes she has chronic back pain which has been worsened by the fall. She states following the fall her nephew helped her back into bed. She reports loss of consciousness but is unsure for how long. She notes she takes her normal regimen of aspirin daily.      PAST MEDICAL HISTORY  Past Medical History:   Diagnosis Date    Anesthesia     \"difficulty breathing\"    Angina     none currently     Angina pectoris 6/19/2009    Arthritis     Backpain 1998    Bowel habit changes     constipation    Breath shortness     hx of, not currently    CAD (coronary artery disease)     Cancer ([facility])     skin    Cervical radiculopathy 4/1/2009    Chronic pain 6/29/2011    COPD (chronic obstructive pulmonary disease) ([facility]) 4/1/2009    Dental disorder     upper/lower dentures    Depression 4/1/2009    Dyslipidemia 6/29/2011    Headache(784.0) 4/1/2009    Heart burn     High cholesterol     History of anemia     History of cardiac catheterization 4/1/2009    History of coronary artery bypass graft 4/1/2009    History of gastrointestinal bleeding 4/1/2009    History of hypertension 6/19/2009    History of " mixed hyperlipidemia 6/19/2009    Lumbar radiculopathy 4/1/2009    Pneumonia 1990    approximate date    PONV (postoperative nausea and vomiting)     Sepsis ([facility]) 2006 or 2007    Sleep apnea     before gastric bypass, no CPAP now    Snoring     Unspecified cataract     IOLS       SURGICAL HISTORY  Past Surgical History:   Procedure Laterality Date    PB RECONSTR TOTAL SHOULDER IMPLANT Left 9/15/2020    Procedure: ARTHROPLASTY, SHOULDER, TOTAL;  Surgeon: Juventino Greene M.D.;  Location: SURGERY [facility];  Service: Orthopedics    BICEPS TENODESIS Left 9/15/2020    Procedure: TENODESIS, BICEPS - AND ADDISON;  Surgeon: Juventino Greene M.D.;  Location: SURGERY [facility];  Service: Orthopedics    HARDWARE REMOVAL NEURO N/A 6/23/2016    Procedure: HARDWARE REMOVAL NEURO L3-S1;  Surgeon: Aaron Martines M.D.;  Location: SURGERY [facility];  Service:     CERVICAL DISK AND FUSION ANTERIOR  4/28/2016    Procedure: CERVICAL DISK AND FUSION ANTERIOR Cervical4-5 WITH PEEK INTERBODY;  Surgeon: Aaron Martines M.D.;  Location: SURGERY [facility];  Service:     BASAL CELL EXCISION  2/19/2015    Performed by Adán Blake Jr., M.D. at SURGERY [facility]    FLAP CLOSURE  2/19/2015    Performed by Adán Blake Jr., M.D. at SURGERY [facility]    CATARACT EXTRACTION WITH IOL Bilateral 2015    OTHER ABDOMINAL SURGERY  2007    gastric bypass    OTHER  2007    neck surgery    OTHER NEUROLOGICAL SURG  2007,    6 back surgeries (at ages 13, 18, 20, 37, and 52)    OTHER CARDIAC SURGERY  2006    bypass x 4 vessels    ARLEN HOLES         FAMILY HISTORY  Family History   Problem Relation Age of Onset    Cancer Father         brain       SOCIAL HISTORY   reports that she quit smoking about 17 years ago. Her smoking use included cigarettes. She started smoking about 57 years ago. She has a 20.0 pack-year smoking history. She has never used smokeless tobacco. She reports that she does not drink alcohol and does not use  "drugs.    CURRENT MEDICATIONS  Previous Medications    ASPIRIN 81 MG EC TABLET    Take 1 Tablet by mouth every day.    ATORVASTATIN (LIPITOR) 40 MG TAB    Take 1 Tablet by mouth every evening.    BACLOFEN (LIORESAL) 20 MG TABLET    Take 20 mg by mouth 2 times a day as needed. Indications: Muscle Spasm    CHOLECALCIFEROL (D3 PO)    Take 1 Capsule by mouth every day.    DULOXETINE (CYMBALTA) 60 MG CAP DR PARTICLES DELAYED-RELEASE CAPSULE    Take 60 mg by mouth every day.    FERROUS SULFATE (IRON PO)    Take 1 Tablet by mouth every day. (OTC)    GABAPENTIN (NEURONTIN) 800 MG TABLET    Take 800 mg by mouth 3 times a day. Per [facility], sheduled, NOT prn    METOPROLOL TARTRATE (LOPRESSOR) 25 MG TAB    Take 0.5 Tablets by mouth 2 times a day.    MULTIVITAMIN TAB    Take 1 Tablet by mouth every day.    OMEGA-3 FATTY ACIDS (FISH OIL PO)    Take 1 Capsule by mouth every day.    ONDANSETRON (ZOFRAN ODT) 4 MG TABLET DISPERSIBLE    Take 1 Tablet by mouth every 8 hours as needed for Nausea/Vomiting.    RAMIPRIL (ALTACE) 10 MG CAPSULE    Take 10 mg by mouth every day.       ALLERGIES  Allergies   Allergen Reactions    Povidone Iodine Rash     rash        PHYSICAL EXAM  /64   Pulse (!) 52   Temp 36.4 °C (97.6 °F) (Temporal)   Resp 14   Ht 1.575 m (5' 2\")   Wt 56 kg (123 lb 7.3 oz)   SpO2 98%   BMI 22.58 kg/m²    Nursing note and vitals reviewed.  Constitutional: Well-developed and well-nourished. No distress.   HENT: Head is normocephalic and abrasion and hemotoma to the central forehead at the midline. Oropharynx is clear and moist without exudate or erythema.   Eyes: Pupils are equal, round, and reactive to light. Conjunctiva are normal.   Cardiovascular: Normal rate and regular rhythm. No murmur heard. Normal radial pulses.  Pulmonary/Chest: Breath sounds normal. No wheezes or rales.   Abdominal: Soft and non-tender. No distention    Musculoskeletal: Extremities exhibit normal range of motion without edema or tenderness. "   Neurological: Awake, alert and oriented to person, place, and time. No focal deficits noted.  Skin: Skin is warm and dry. No rash.   Psychiatric: Normal mood and affect. Appropriate for clinical situation    DIAGNOSTIC STUDIES    Radiology:   This attending emergency physician has independently interpreted the diagnostic imaging associated with this visit and is awaiting the final reading from the radiologist.   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  Moderate-sized wedge-shaped area of encephalomalacia involving the right parietal and posterior temporal lobes.              INITIAL ASSESSMENT AND PLAN    1:53 PM - Patient was evaluated at the charge desk as a code TBI. Ordered for CT-Head without to evaluate. Patient verbalizes understanding and support with my plan of care.  The patient will undergo trauma evaluation.    ED Observation Status? No; Patient does not meet criteria for ED Observation.      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 Patient was updated on diagnostic results as detailed above. She was informed of the plan for discharge home. Patient verbalizes understanding and agreement to this plan of care.       DISPOSITION AND DISCUSSIONS    I have discussed management of the patient with the following physicians and OC's:  None noted    Discussion of management with other QHP or appropriate source(s): None     Escalation of care considered, and ultimately not performed: acute inpatient care management, however at this time, the patient is most appropriate for outpatient management.    Barriers to care at this time, including but not limited to:  None noted .     Decision tools and prescription drugs considered including, but not limited to: Medication modification I do not see any indication for medication  modification.    The patient will return for new or worsening symptoms and is stable at the time of discharge.    The patient is referred to a primary physician for blood pressure management, diabetic screening, and for all other preventative health concerns.    DISPOSITION:  Patient will be discharged home in stable condition.    FOLLOW UP:  Apple Gates M.D.  [address] [place] [address]  [phone]    Schedule an appointment as soon as possible for a visit       [facility], Emergency Dept  [facility] [address] [address] [phone]    If symptoms worsen    FINAL DIAGNOSIS  1. Fall, initial encounter    2. Antiplatelet or antithrombotic long-term use         IMarlyn (Scribe), am scribing for, and in the presence of, Aman Muhammad M.D..    Electronically signed by: Marlyn Hillman (Ritaibe), 12/11/2023    Aman MERINO M.D. personally performed the services described in this documentation, as scribed by Marlyn Hillman in my presence, and it is both accurate and complete.      The note accurately reflects work and decisions made by me.  Aman Muhammad M.D.  12/11/2023  7:08 PM

## 2024-03-08 ENCOUNTER — HOSPITAL ENCOUNTER (EMERGENCY)
Facility: MEDICAL CENTER | Age: 69
End: 2024-03-08
Payer: MEDICARE

## 2024-09-07 ENCOUNTER — PHARMACY VISIT (OUTPATIENT)
Dept: PHARMACY | Facility: MEDICAL CENTER | Age: 69
End: 2024-09-07
Payer: COMMERCIAL

## 2024-09-07 ENCOUNTER — HOSPITAL ENCOUNTER (EMERGENCY)
Facility: MEDICAL CENTER | Age: 69
End: 2024-09-07
Attending: EMERGENCY MEDICINE
Payer: MEDICARE

## 2024-09-07 VITALS
TEMPERATURE: 97.8 F | WEIGHT: 110 LBS | HEART RATE: 61 BPM | DIASTOLIC BLOOD PRESSURE: 71 MMHG | HEIGHT: 61 IN | SYSTOLIC BLOOD PRESSURE: 135 MMHG | OXYGEN SATURATION: 94 % | BODY MASS INDEX: 20.77 KG/M2 | RESPIRATION RATE: 17 BRPM

## 2024-09-07 DIAGNOSIS — M54.50 CHRONIC BILATERAL LOW BACK PAIN WITHOUT SCIATICA: ICD-10-CM

## 2024-09-07 DIAGNOSIS — G89.29 CHRONIC BILATERAL LOW BACK PAIN WITHOUT SCIATICA: ICD-10-CM

## 2024-09-07 DIAGNOSIS — M54.16 LUMBAR RADICULOPATHY: Chronic | ICD-10-CM

## 2024-09-07 PROCEDURE — A9270 NON-COVERED ITEM OR SERVICE: HCPCS | Mod: UD | Performed by: EMERGENCY MEDICINE

## 2024-09-07 PROCEDURE — 700102 HCHG RX REV CODE 250 W/ 637 OVERRIDE(OP): Mod: UD | Performed by: EMERGENCY MEDICINE

## 2024-09-07 PROCEDURE — 700111 HCHG RX REV CODE 636 W/ 250 OVERRIDE (IP): Performed by: EMERGENCY MEDICINE

## 2024-09-07 PROCEDURE — RXMED WILLOW AMBULATORY MEDICATION CHARGE: Performed by: EMERGENCY MEDICINE

## 2024-09-07 PROCEDURE — 99285 EMERGENCY DEPT VISIT HI MDM: CPT

## 2024-09-07 PROCEDURE — 96372 THER/PROPH/DIAG INJ SC/IM: CPT

## 2024-09-07 RX ORDER — DIAZEPAM 5 MG
5 TABLET ORAL ONCE
Status: COMPLETED | OUTPATIENT
Start: 2024-09-07 | End: 2024-09-07

## 2024-09-07 RX ORDER — HYDROMORPHONE HYDROCHLORIDE 1 MG/ML
1 INJECTION, SOLUTION INTRAMUSCULAR; INTRAVENOUS; SUBCUTANEOUS ONCE
Status: COMPLETED | OUTPATIENT
Start: 2024-09-07 | End: 2024-09-07

## 2024-09-07 RX ORDER — PREDNISONE 20 MG/1
40 TABLET ORAL ONCE
Status: COMPLETED | OUTPATIENT
Start: 2024-09-07 | End: 2024-09-07

## 2024-09-07 RX ORDER — PREDNISONE 20 MG/1
40 TABLET ORAL DAILY
Qty: 8 TABLET | Refills: 0 | Status: SHIPPED | OUTPATIENT
Start: 2024-09-07 | End: 2024-09-11

## 2024-09-07 RX ADMIN — HYDROMORPHONE HYDROCHLORIDE 1 MG: 1 INJECTION, SOLUTION INTRAMUSCULAR; INTRAVENOUS; SUBCUTANEOUS at 11:35

## 2024-09-07 RX ADMIN — DIAZEPAM 5 MG: 5 TABLET ORAL at 11:34

## 2024-09-07 RX ADMIN — PREDNISONE 40 MG: 20 TABLET ORAL at 13:13

## 2024-09-07 ASSESSMENT — PAIN DESCRIPTION - PAIN TYPE: TYPE: ACUTE PAIN

## 2024-09-07 ASSESSMENT — FIBROSIS 4 INDEX: FIB4 SCORE: 0.33

## 2024-09-07 NOTE — ED PROVIDER NOTES
ED Provider Note    CHIEF COMPLAINT  Chief Complaint   Patient presents with    Back Pain     Left side back pain radiating to left leg          EXTERNAL RECORDS REVIEWED  Patient's last encounter was an ED visit in December of last year patient was seen after a fall.  She fell in the shower and complained of low back pain.  Noted to be under the control of chronic pain management at that time as well.  Prior to that patient was seen in November of last year for pain in her left arm.  ED visit in September of last year for nausea, vomiting and diarrhea.    Review of prior records from care everywhere.  Patient reports that her bowel obstruction was a month ago but it was on July 10, 2 months ago, done at [facility] by [facility].  Additionally, she is followed by Dr. PORTILLO, pain management.  She has seen him as recently as earlier this month.  According to those records, patient is on 5 mg of Valium, gabapentin, 2 doses of oxycodone including 15 mg and 30 mg tablets as well as diclofenac    HPI/ROS  LIMITATION TO HISTORY   Select: : None  OUTSIDE HISTORIAN(S):  EMS tylenol and ibuprofen given in route    Vaishali Kristal Emery is a 69 y.o. female who presents to the emergency department via EMS with a chief complaint of an acute exacerbation of her chronic back pain.  She tells me that she takes Percocet and has a fentanyl patch to manage her pain.  She is also followed by pain management.  Her next appointment is on Wednesday of this upcoming week.  She states her pain started at 6:00 this morning.  Normally she is able to ambulate but it has been difficult this morning.  She is recovering from a small bowel obstruction that required surgery, this was 2 months ago, done at [facility].  She has a history of numerous prior back surgeries 8 in total and 3 neck surgeries.  She denies a fever.  She has had nausea.  She states this she feels is just healing from her  surgery.  This is not new.  She reports an episode of diarrhea yesterday, nonbloody.  No urinary incontinence or retention.  No bowel incontinence or retention.  No weakness in her legs.  She states that the characteristic of her pain is something she has experienced multiple times before but is worse and the pain is not well-controlled with her current regiment.  She denies running out of any of her medications.  She states she is on oxycodone 30 mg every 8 hours and she has has a fentanyl patch that she applies every 3 days.    PAST MEDICAL HISTORY   has a past medical history of Anesthesia, Angina, Angina pectoris (6/19/2009), Arthritis, Backpain (1998), Bowel habit changes, Breath shortness, CAD (coronary artery disease), Cancer ([facility]), Cervical radiculopathy (4/1/2009), Chronic pain (6/29/2011), COPD (chronic obstructive pulmonary disease) ([facility]) (4/1/2009), Dental disorder, Depression (4/1/2009), Dyslipidemia (6/29/2011), Headache(784.0) (4/1/2009), Heart burn, High cholesterol, History of anemia, History of cardiac catheterization (4/1/2009), History of coronary artery bypass graft (4/1/2009), History of gastrointestinal bleeding (4/1/2009), History of hypertension (6/19/2009), History of mixed hyperlipidemia (6/19/2009), Lumbar radiculopathy (4/1/2009), Pneumonia (1990), PONV (postoperative nausea and vomiting), Sepsis ([facility]) (2006 or 2007), Sleep apnea, Snoring, and Unspecified cataract.    SURGICAL HISTORY   has a past surgical history that includes other abdominal surgery (2007); other (2007); other cardiac surgery (2006); basal cell excision (2/19/2015); flap closure (2/19/2015); ritika holes; other neurological surg (2007,); hardware removal neuro (N/A, 6/23/2016); cataract extraction with iol (Bilateral, 2015); cervical disk and fusion anterior (4/28/2016); reconstr total shoulder implant (Left, 9/15/2020); and biceps tenodesis (Left, 9/15/2020).    FAMILY HISTORY  Family History   Problem Relation Age of  "Onset    Cancer Father         brain       SOCIAL HISTORY  Social History     Tobacco Use    Smoking status: Former     Current packs/day: 0.00     Average packs/day: 0.5 packs/day for 40.0 years (20.0 ttl pk-yrs)     Types: Cigarettes     Start date: 1966     Quit date: 2006     Years since quittin.6    Smokeless tobacco: Never   Vaping Use    Vaping status: Every Day    Substances: Nicotine    Devices: Pre-filled or refillable cartridge   Substance and Sexual Activity    Alcohol use: No    Drug use: No    Sexual activity: Not on file       CURRENT MEDICATIONS  Home Medications       Reviewed by Malou Bartlett R.N. (Registered Nurse) on 24 at 1044  Med List Status: Not Addressed     Medication Last Dose Status   aspirin 81 MG EC tablet  Active   atorvastatin (LIPITOR) 40 MG Tab  Active   baclofen (LIORESAL) 20 MG tablet  Active   Cholecalciferol (D3 PO)  Active   DULoxetine (CYMBALTA) 60 MG Cap DR Particles delayed-release capsule  Active   Ferrous Sulfate (IRON PO)  Active   gabapentin (NEURONTIN) 800 MG tablet  Active   metoprolol tartrate (LOPRESSOR) 25 MG Tab  Active   multivitamin Tab  Active   Omega-3 Fatty Acids (FISH OIL PO)  Active   ondansetron (ZOFRAN ODT) 4 MG TABLET DISPERSIBLE  Active   ramipril (ALTACE) 10 MG capsule  Active                    ALLERGIES  Allergies   Allergen Reactions    Povidone Iodine Rash     rash       PHYSICAL EXAM  VITAL SIGNS: /71   Pulse 61   Temp 36.6 °C (97.8 °F) (Temporal)   Resp 17   Ht 1.549 m (5' 1\")   Wt 49.9 kg (110 lb)   SpO2 94%   BMI 20.78 kg/m²    Vitals reviewed.  Constitutional: Patient is oriented to person, place, and time. Appears well-developed and well-nourished. Moderate distress.    Head: Normocephalic and atraumatic.   Mouth/Throat: Oropharynx is clear  Eyes: Conjunctivae are normal. Pupils are equal, round  Neck: Normal range of motion. Neck supple.   Cardiovascular: Normal rate, regular rhythm and normal heart sounds. " Normal peripheral pulses, bilateral LE.  Pulmonary/Chest: Effort normal and breath sounds normal. No respiratory distress, no wheezes.  Abdominal: Soft. Bowel sounds are normal. There is no tenderness, rebound or guarding. Multiple well-healed surgical scars.  Musculoskeletal: No edema and no tenderness, except as noted.  No overlying skin changes.  Well-healed midline surgical wounds of the lumbar spine.   Neurological: No cranial nerve deficits. Normal motor and sensory exam. No focal deficits.   Skin: Skin is warm and dry. No erythema. No pallor.   Psychiatric: Patient has a normal mood and affect.     EKG/LABS    RADIOLOGY/PROCEDURES     COURSE & MEDICAL DECISION MAKING    ASSESSMENT, COURSE AND PLAN  Care Narrative:     This is a 69-year-old female who presents with an acute exacerbation of chronic back pain.  Brought in by EMS.  She was treated with Tylenol and ibuprofen and route.  She is normally it appears, on a regiment of benzodiazepines, Valium, oxycodone, 2 separate strengths, fentanyl patch, diclofenac and gabapentin.  She is followed by pain management.  She is afebrile.  She denies bowel or bladder incontinence or retention.  She describes her pain as the same character as she has had chronically but not well-controlled.    1 PM patient is reevaluated at the bedside.  As I enter the room for reexam, patient appears to be resting and looks quite trouble.  She awakens and is still complaining of pain although she does report that it is much better.  She states that her fentanyl patch is not due to be changed until tomorrow and she last took her oxycodone at 9 AM.  Using she would benefit from a pulse dose of steroids.  First dose given here in the ED.    2:57 PM patient's reevaluated at the bedside.  She is feeling better.  I do think she could benefit from a pulse dose of steroids for home.  She assures me, she has all of her normal medications as part of her pain management regimen and she sees her  pain management physician on Wednesday.  She has no new neurologic deficits.  Her daughter is here to take her home.  She is given strict return precautions.  She is discharged in stable condition.    ADDITIONAL PROBLEMS MANAGED    DISPOSITION AND DISCUSSIONS  I have discussed management of the patient with the following physicians and OC's:  None    Discussion of management with other QHP or appropriate source(s): None     Escalation of care considered, and ultimately not performed:acute inpatient care management, however at this time, the patient is most appropriate for outpatient management    Barriers to care at this time, including but not limited to: None.     Decision tools and prescription drugs considered including, but not limited to: None.    FINAL DIAGNOSIS  1. Lumbar radiculopathy    2. Chronic bilateral low back pain without sciatica         Electronically signed by: Amber Vences D.O., 9/7/2024 11:20 AM

## 2024-09-07 NOTE — ED TRIAGE NOTES
"Chief Complaint   Patient presents with    Back Pain     Left side back pain radiating to left leg        Pt BIBA from home for the above mentioned complaints. Pt stated she has a chronic back pain and had multiple back surgeries. Per EMS report patient woke up at around 6 am with severe back pain more on the left side radiating down to her left hip and leg. Pt received 1000 mg tylenol and 650 ibuprofen by ems en route.     BP (!) 175/79   Pulse 70   Temp 36.6 °C (97.8 °F) (Temporal)   Resp 17   Ht 1.549 m (5' 1\")   Wt 49.9 kg (110 lb)   SpO2 95%   BMI 20.78 kg/m²      "

## 2024-09-07 NOTE — ED NOTES
Pt discharged home. GCS 15. Pt in possession of belongings. Pt provided discharge education and information pertaining to medications and revisit ER if symptoms get worse. Pt received copy of discharge instructions and verbalized understanding.     Vitals:    09/07/24 1448   BP: 135/71   Pulse: 61   Resp: 17   Temp: 36.6 °C (97.8 °F)   SpO2: 94%

## (undated) DEVICE — SODIUM CHL. IRRIGATION 0.9% 3000ML (4EA/CA 65CA/PF)

## (undated) DEVICE — LACTATED RINGERS INJ 1000 ML - (14EA/CA 60CA/PF)

## (undated) DEVICE — TOWELS CLOTH SURGICAL - (4/PK 20PK/CA)

## (undated) DEVICE — HUMID-VENT HEAT AND MOISTURE EXCHANGE- (50/BX)

## (undated) DEVICE — ELECTRODE DUAL RETURN W/ CORD - (50/PK)

## (undated) DEVICE — SHAVER, 5.5 RESECTOR

## (undated) DEVICE — NEPTUNE 4 PORT MANIFOLD - (20/PK)

## (undated) DEVICE — KIT ANESTHESIA W/CIRCUIT & 3/LT BAG W/FILTER (20EA/CA)

## (undated) DEVICE — LENS/HOOD FOR SPACESUIT - (32/PK) PEEL AWAY FACE

## (undated) DEVICE — ARTHROWAND TURBOVAC 3.5/90 SCT

## (undated) DEVICE — GUIDE PIN

## (undated) DEVICE — TUBING PATIENT W/CONNECTOR REDEUCE (1EA)

## (undated) DEVICE — PROTECTOR ULNA NERVE - (36PR/CA)

## (undated) DEVICE — DRAPE U SPLIT IMP 54 X 76 - (24/CA)

## (undated) DEVICE — GLOVE BIOGEL INDICATOR SZ 8 SURGICAL PF LTX - (50/BX 4BX/CA)

## (undated) DEVICE — CHLORAPREP 26 ML APPLICATOR - ORANGE TINT(25/CA)

## (undated) DEVICE — GLOVE BIOGEL INDICATOR SZ 7.5 SURGICAL PF LTX - (50PR/BX 4BX/CA)

## (undated) DEVICE — DRAPETIBURON SHOULDER W/POUCH - (5EA/CA)

## (undated) DEVICE — HEAD HOLDER JUNIOR/ADULT

## (undated) DEVICE — PACK TOTAL HIP - (1/CA)

## (undated) DEVICE — MASK ANESTHESIA ADULT  - (100/CA)

## (undated) DEVICE — TIP INTPLS HFLO ML ORFC BTRY - (12/CS)  FOR SURGILAV

## (undated) DEVICE — PACK SHOULDER ARTHROSCOPY SM - (2EA/CA)

## (undated) DEVICE — TUBING PUMP WITH CONNECTOR REDEUCE (1EA)

## (undated) DEVICE — STAPLER SKIN DISP - (6/BX 10BX/CA) VISISTAT

## (undated) DEVICE — HANDPIECE 10FT INTPLS SCT PLS IRRIGATION HAND CONTROL SET (6/PK)

## (undated) DEVICE — SUTURE PRELOADED #2 ULTRABRAID COBRAID (10EA/BX)

## (undated) DEVICE — GLOVE SZ 7.5 LF PROTEXIS (50PR/BX)

## (undated) DEVICE — BLADE SAGITTAL SAW DUAL CUT 25.0 X 90.0 X 1.27MM (1/EA)

## (undated) DEVICE — SUTURE 2-0 MONOCRYL SH&UR-6 27 - (12/BX)

## (undated) DEVICE — SUTURE GENERAL

## (undated) DEVICE — GLOVE, LITE (PAIR)

## (undated) DEVICE — GLOVE BIOGEL SZ 7 SURGICAL PF LTX - (50PR/BX 4BX/CA)

## (undated) DEVICE — WATER IRRIGATION STERILE 1000ML (12EA/CA)

## (undated) DEVICE — SPIDER SHOULDER HOLDER (12EA/BX)

## (undated) DEVICE — SODIUM CHL IRRIGATION 0.9% 1000ML (12EA/CA)

## (undated) DEVICE — SUTURE 1 VICRYL PLUS CT-1 - 18 INCH (12/BX)

## (undated) DEVICE — DRESSING AQUACEL AG ADVANTAGE 3.5 X 10" (10EA/BX)"

## (undated) DEVICE — BAG SPONGE COUNT 10.25 X 32 - BLUE (250/CA)

## (undated) DEVICE — SENSOR SPO2 NEO LNCS ADHESIVE (20/BX) SEE USER NOTES

## (undated) DEVICE — CANISTER SUCTION RIGID RED 1500CC (40EA/CA)

## (undated) DEVICE — SUTURE 3-0 ETHILON FS-1 - (36/BX) 30 INCH

## (undated) DEVICE — SHAVER4.0 AGGRESSIVE + FORMLA (5EA/BX)

## (undated) DEVICE — ELECTRODE 850 FOAM ADHESIVE - HYDROGEL RADIOTRNSPRNT (50/PK)

## (undated) DEVICE — SUCTION INSTRUMENT YANKAUER BULBOUS TIP W/O VENT (50EA/CA)

## (undated) DEVICE — GOWN WARMING STANDARD FLEX - (30/CA)

## (undated) DEVICE — TUBE CONNECTING SUCTION - CLEAR PLASTIC STERILE 72 IN (50EA/CA)

## (undated) DEVICE — BLADE SAGITTAL 6 SYSTEM 25MM